# Patient Record
Sex: FEMALE | Race: WHITE | NOT HISPANIC OR LATINO | Employment: FULL TIME | URBAN - METROPOLITAN AREA
[De-identification: names, ages, dates, MRNs, and addresses within clinical notes are randomized per-mention and may not be internally consistent; named-entity substitution may affect disease eponyms.]

---

## 2017-10-26 ENCOUNTER — TRANSCRIBE ORDERS (OUTPATIENT)
Dept: ADMINISTRATIVE | Facility: HOSPITAL | Age: 57
End: 2017-10-26

## 2017-10-26 DIAGNOSIS — Z12.39 SCREENING BREAST EXAMINATION: Primary | ICD-10-CM

## 2017-11-22 ENCOUNTER — HOSPITAL ENCOUNTER (OUTPATIENT)
Dept: MAMMOGRAPHY | Facility: HOSPITAL | Age: 57
Discharge: HOME | End: 2017-11-22
Attending: FAMILY MEDICINE | Admitting: FAMILY MEDICINE

## 2017-11-22 DIAGNOSIS — Z12.39 SCREENING BREAST EXAMINATION: ICD-10-CM

## 2017-11-22 PROCEDURE — 77063 BREAST TOMOSYNTHESIS BI: CPT

## 2017-11-22 PROCEDURE — G0202 SCR MAMMO BI INCL CAD: HCPCS

## 2018-10-26 ENCOUNTER — TRANSCRIBE ORDERS (OUTPATIENT)
Dept: ADMINISTRATIVE | Facility: HOSPITAL | Age: 58
End: 2018-10-26

## 2018-10-26 DIAGNOSIS — Z12.39 SCREENING BREAST EXAMINATION: Primary | ICD-10-CM

## 2018-11-28 ENCOUNTER — HOSPITAL ENCOUNTER (OUTPATIENT)
Dept: MAMMOGRAPHY | Facility: HOSPITAL | Age: 58
Discharge: HOME | End: 2018-11-28
Attending: FAMILY MEDICINE | Admitting: FAMILY MEDICINE

## 2018-11-28 DIAGNOSIS — Z12.39 SCREENING BREAST EXAMINATION: ICD-10-CM

## 2018-11-28 PROCEDURE — 77063 BREAST TOMOSYNTHESIS BI: CPT

## 2018-11-28 PROCEDURE — 77067 SCR MAMMO BI INCL CAD: CPT

## 2018-12-10 ENCOUNTER — TELEPHONE (OUTPATIENT)
Dept: GASTROENTEROLOGY | Facility: CLINIC | Age: 58
End: 2018-12-10
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
## 2018-12-13 ENCOUNTER — PREP FOR SURGERY (OUTPATIENT)
Dept: OTHER | Facility: HOSPITAL | Age: 58
End: 2018-12-13

## 2018-12-13 DIAGNOSIS — D12.6 ADENOMATOUS POLYP OF COLON, UNSPECIFIED PART OF COLON: Primary | ICD-10-CM

## 2019-01-31 ENCOUNTER — TELEPHONE (OUTPATIENT)
Dept: GASTROENTEROLOGY | Facility: CLINIC | Age: 59
End: 2019-01-31

## 2019-01-31 NOTE — TELEPHONE ENCOUNTER
CALLED AND SPOKE WITH PT.  SHE DID NOT KNOW IF SOMEONE GOING TO CALL HER THE DAY BEFORE PROCEDURE TO INSTRUCTED HER ABOUT PREP.  EXPLAINED NEED TO FOLLOW THE DIRECTIONS ON THE MIRALAX SHEET.

## 2019-02-11 ENCOUNTER — OUTSIDE FACILITY SERVICE (OUTPATIENT)
Dept: GASTROENTEROLOGY | Facility: CLINIC | Age: 59
End: 2019-02-11

## 2019-02-11 PROCEDURE — 45380 COLONOSCOPY AND BIOPSY: CPT | Performed by: INTERNAL MEDICINE

## 2019-02-11 PROCEDURE — 88305 TISSUE EXAM BY PATHOLOGIST: CPT | Performed by: INTERNAL MEDICINE

## 2019-02-12 ENCOUNTER — LAB REQUISITION (OUTPATIENT)
Dept: LAB | Facility: HOSPITAL | Age: 59
End: 2019-02-12

## 2019-02-12 DIAGNOSIS — D12.6 BENIGN NEOPLASM OF COLON: ICD-10-CM

## 2019-02-13 LAB
CYTO UR: NORMAL
LAB AP CASE REPORT: NORMAL
LAB AP CLINICAL INFORMATION: NORMAL
PATH REPORT.FINAL DX SPEC: NORMAL
PATH REPORT.GROSS SPEC: NORMAL

## 2019-10-15 ENCOUNTER — OFFICE VISIT (OUTPATIENT)
Dept: ORTHOPEDIC SURGERY | Facility: CLINIC | Age: 59
End: 2019-10-15

## 2019-10-15 VITALS
SYSTOLIC BLOOD PRESSURE: 142 MMHG | DIASTOLIC BLOOD PRESSURE: 84 MMHG | HEART RATE: 75 BPM | WEIGHT: 203 LBS | HEIGHT: 67 IN | BODY MASS INDEX: 31.86 KG/M2

## 2019-10-15 DIAGNOSIS — R52 PAIN: Primary | ICD-10-CM

## 2019-10-15 DIAGNOSIS — M94.261 CHONDROMALACIA OF KNEE, RIGHT: ICD-10-CM

## 2019-10-15 PROCEDURE — 20610 DRAIN/INJ JOINT/BURSA W/O US: CPT | Performed by: ORTHOPAEDIC SURGERY

## 2019-10-15 PROCEDURE — 73562 X-RAY EXAM OF KNEE 3: CPT | Performed by: ORTHOPAEDIC SURGERY

## 2019-10-15 PROCEDURE — 99203 OFFICE O/P NEW LOW 30 MIN: CPT | Performed by: ORTHOPAEDIC SURGERY

## 2019-10-15 RX ORDER — FLUTICASONE PROPIONATE 50 MCG
SPRAY, SUSPENSION (ML) NASAL
COMMUNITY

## 2019-10-15 RX ORDER — ALPRAZOLAM 0.25 MG/1
TABLET ORAL
COMMUNITY

## 2019-10-15 RX ADMIN — TRIAMCINOLONE ACETONIDE 80 MG: 40 INJECTION, SUSPENSION INTRA-ARTICULAR; INTRAMUSCULAR at 16:05

## 2019-10-15 RX ADMIN — LIDOCAINE HYDROCHLORIDE 8 ML: 10 INJECTION, SOLUTION EPIDURAL; INFILTRATION; INTRACAUDAL; PERINEURAL at 16:05
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
## 2019-10-16 RX ORDER — TRIAMCINOLONE ACETONIDE 40 MG/ML
80 INJECTION, SUSPENSION INTRA-ARTICULAR; INTRAMUSCULAR
Status: COMPLETED | OUTPATIENT
Start: 2019-10-15 | End: 2019-10-15

## 2019-10-16 RX ORDER — LIDOCAINE HYDROCHLORIDE 10 MG/ML
8 INJECTION, SOLUTION EPIDURAL; INFILTRATION; INTRACAUDAL; PERINEURAL
Status: COMPLETED | OUTPATIENT
Start: 2019-10-15 | End: 2019-10-15

## 2019-10-22 ENCOUNTER — TRANSCRIBE ORDERS (OUTPATIENT)
Dept: ADMINISTRATIVE | Facility: HOSPITAL | Age: 59
End: 2019-10-22

## 2019-10-22 DIAGNOSIS — Z12.31 SCREENING MAMMOGRAM, ENCOUNTER FOR: Primary | ICD-10-CM

## 2019-11-12 ENCOUNTER — TRANSCRIBE ORDERS (OUTPATIENT)
Dept: ADMINISTRATIVE | Facility: HOSPITAL | Age: 59
End: 2019-11-12

## 2019-11-12 DIAGNOSIS — Z78.0 MENOPAUSE: Primary | ICD-10-CM

## 2019-12-03 ENCOUNTER — OFFICE VISIT (OUTPATIENT)
Dept: ORTHOPEDIC SURGERY | Facility: CLINIC | Age: 59
End: 2019-12-03

## 2019-12-03 VITALS — BODY MASS INDEX: 31.86 KG/M2 | WEIGHT: 203 LBS | HEIGHT: 67 IN

## 2019-12-03 DIAGNOSIS — M94.261 CHONDROMALACIA OF KNEE, RIGHT: Primary | ICD-10-CM

## 2019-12-03 PROCEDURE — 99212 OFFICE O/P EST SF 10 MIN: CPT | Performed by: ORTHOPAEDIC SURGERY

## 2019-12-03 NOTE — PROGRESS NOTES
Subjective:     Patient ID: Pamela Mack is a 59 y.o. female.    Chief Complaint: Follow-up right knee pain    History of Present Illness  Pamela Mack returns to clinic today for evaluation of her right knee   The patient had prior cortisone injection to the right knee on 10/15/19. She notes approximately 100% improvement of the pain with the injection, lasting 3 weeks. Since that time, some of her pain has returned but she states the pain is tolerable.  Today, she rates the pain as 5/10 at its worst, describes it as aching in nature. She has only had once instance of clicking and catching.    Localizes pain to medial and anterior aspects.    She has been very active in the past few weeks and has tolerated the activity reasonably well.   Symptoms are exacerbated with turning and crossing her legs.   Denies radiation of pain, denies associated numbness or tingling.     Social History     Occupational History   • Not on file   Tobacco Use   • Smoking status: Former Smoker   • Smokeless tobacco: Never Used   Substance and Sexual Activity   • Alcohol use: No     Frequency: Never   • Drug use: No   • Sexual activity: Defer      Past Medical History:   Diagnosis Date   • Colon polyp    • Fracture of wrist      Past Surgical History:   Procedure Laterality Date   • AVULSION TOENAIL PLATE Bilateral    • COLONOSCOPY     • GALLBLADDER SURGERY         Family History   Problem Relation Age of Onset   • Breast cancer Neg Hx          Review of Systems   Constitutional: Negative for chills, diaphoresis, fever and unexpected weight change.   HENT: Negative for hearing loss, nosebleeds, sore throat and tinnitus.    Eyes: Negative for pain and visual disturbance.   Respiratory: Negative for cough, shortness of breath and wheezing.    Cardiovascular: Negative for chest pain and palpitations.   Gastrointestinal: Negative for abdominal pain, diarrhea, nausea and vomiting.   Endocrine: Negative for cold intolerance, heat intolerance and  "polydipsia.   Genitourinary: Negative for difficulty urinating, dysuria and hematuria.   Musculoskeletal: Positive for arthralgias and myalgias. Negative for joint swelling.   Skin: Negative for rash and wound.   Allergic/Immunologic: Negative for environmental allergies.   Neurological: Negative for dizziness, syncope and numbness.   Hematological: Does not bruise/bleed easily.   Psychiatric/Behavioral: Negative for dysphoric mood and sleep disturbance. The patient is not nervous/anxious.            Objective:  Vitals:    12/03/19 1542   Weight: 92.1 kg (203 lb)   Height: 168.9 cm (66.5\")         12/03/19  1542   Weight: 92.1 kg (203 lb)     Body mass index is 32.27 kg/m².    General: No acute distress.  Resp: normal respiratory effort  Skin: no rashes or wounds; normal turgor  Psych: mood and affect appropriate; recent and remote memory intact      Ortho Exam       Right Knee-    ROM 0-130 degrees  Maximal tenderness medial and lateral patellar facet   Negative Liu    Effusion- mild     No opening on varus and valgus stress at 0 and 30  Active patellar compression test- positive   Log roll-  negative  Stinchfield-  negative    Positive sensation light tough all distributions symmetric to contralateral side  Brisk cap refill all digits  2+ dorsalis pedis pulse    Imaging:  None    Assessment:        1. Chondromalacia of knee, right           Plan:          1. Discussed treatment options at length with patient at today's visit.   2. Advised patient to continue at-home exercise program for improvement in strength, range of motion and function with daily activities.  3. Follow-up in 6 weeks, if her pain worsens we may consider a repeat cortisone injection.      Pamela Mack was in agreement with plan and had all questions answered.     Orders:  No orders of the defined types were placed in this encounter.      Medications:  No orders of the defined types were placed in this encounter.      Followup:  No follow-ups " on file.    Pamela was seen today for follow-up and pain.    Diagnoses and all orders for this visit:    Chondromalacia of knee, right        By signing my name here, I Lea Kramer, attest that all documentation on 12/10/19 at 9:29 AM has been prepared under the direction and in the presence of Dr. Zion Taylor.    I, Dr. Zion Taylor, personally performed the services described in this documentation, as scribed by Lea Kramer, in my presence, and it is both accurate and complete.      Dictated utilizing Dragon dictation

## 2019-12-04 ENCOUNTER — HOSPITAL ENCOUNTER (OUTPATIENT)
Dept: BONE DENSITY | Facility: HOSPITAL | Age: 59
End: 2019-12-04

## 2019-12-04 ENCOUNTER — APPOINTMENT (OUTPATIENT)
Dept: BONE DENSITY | Facility: HOSPITAL | Age: 59
End: 2019-12-04

## 2019-12-04 ENCOUNTER — HOSPITAL ENCOUNTER (OUTPATIENT)
Dept: MAMMOGRAPHY | Facility: HOSPITAL | Age: 59
Discharge: HOME | End: 2019-12-04
Admitting: FAMILY MEDICINE

## 2019-12-04 DIAGNOSIS — Z12.31 SCREENING MAMMOGRAM, ENCOUNTER FOR: ICD-10-CM

## 2019-12-04 DIAGNOSIS — Z78.0 MENOPAUSE: ICD-10-CM

## 2019-12-04 PROCEDURE — 77067 SCR MAMMO BI INCL CAD: CPT

## 2019-12-04 PROCEDURE — 77063 BREAST TOMOSYNTHESIS BI: CPT

## 2019-12-04 PROCEDURE — 77080 DXA BONE DENSITY AXIAL: CPT

## 2020-02-11 ENCOUNTER — OFFICE VISIT (OUTPATIENT)
Dept: ORTHOPEDIC SURGERY | Facility: CLINIC | Age: 60
End: 2020-02-11

## 2020-02-11 DIAGNOSIS — M94.261 CHONDROMALACIA OF KNEE, RIGHT: Primary | ICD-10-CM

## 2020-02-11 PROCEDURE — 99213 OFFICE O/P EST LOW 20 MIN: CPT | Performed by: ORTHOPAEDIC SURGERY

## 2020-02-11 RX ORDER — MELOXICAM 7.5 MG/1
7.5 TABLET ORAL DAILY
Qty: 30 TABLET | Refills: 0 | Status: SHIPPED | OUTPATIENT
Start: 2020-02-11 | End: 2023-02-10 | Stop reason: HOSPADM

## 2020-02-11 NOTE — PROGRESS NOTES
Subjective:     Patient ID: Pamela Mack is a 59 y.o. female.    Chief Complaint: follow-up right knee pain    History of Present Illness  Pamela Mack returns to clinic today for evaluation of her right knee. She states her right knee pain has worsened over the past 10 days. Her last cortisone injection was 10/15/19. Her pain is localized to the medial and anterior aspects of her knee. She rates her pain as a 7/10 at its worst with movement. She also reports some swelling.   She denies any radiation of her pain. She denies any numbness and tingling.      Social History     Occupational History   • Not on file   Tobacco Use   • Smoking status: Former Smoker   • Smokeless tobacco: Never Used   Substance and Sexual Activity   • Alcohol use: No     Frequency: Never   • Drug use: No   • Sexual activity: Defer      Past Medical History:   Diagnosis Date   • Colon polyp    • Fracture of wrist      Past Surgical History:   Procedure Laterality Date   • AVULSION TOENAIL PLATE Bilateral    • COLONOSCOPY     • GALLBLADDER SURGERY         Family History   Problem Relation Age of Onset   • Breast cancer Neg Hx          Review of Systems   Constitutional: Negative for chills, diaphoresis, fever and unexpected weight change.   HENT: Negative for hearing loss, nosebleeds, sore throat and tinnitus.    Eyes: Negative for pain and visual disturbance.   Respiratory: Negative for cough, shortness of breath and wheezing.    Cardiovascular: Negative for chest pain and palpitations.   Gastrointestinal: Negative for abdominal pain, diarrhea, nausea and vomiting.   Endocrine: Negative for cold intolerance, heat intolerance and polydipsia.   Genitourinary: Negative for difficulty urinating, dysuria and hematuria.   Musculoskeletal: Positive for arthralgias. Negative for joint swelling and myalgias.   Skin: Negative for rash and wound.   Allergic/Immunologic: Negative for environmental allergies.   Neurological: Negative for dizziness, syncope  and numbness.   Hematological: Does not bruise/bleed easily.   Psychiatric/Behavioral: Negative for dysphoric mood and sleep disturbance. The patient is not nervous/anxious.            Objective:  There were no vitals filed for this visit.  There were no vitals filed for this visit.  There is no height or weight on file to calculate BMI.     General: No acute distress.  Resp: normal respiratory effort  Skin: no rashes or wounds; normal turgor  Psych: mood and affect appropriate; recent and remote memory intact      Ortho Exam       Right Knee-    ROM 0-125 degrees  Positive  Patellofemoral crepitus  Moderate tenderness lateral patellar facet and posterior medial joint line  Negative Liu exam  Active patellar compression test- positive   Straight leg raise- negative  Log roll-  negative  Stinchfield-  negative  Positive sensation light tough all distributions symmetric to contralateral side  Brisk cap refill all digits  2+ dorsalis pedis pulse    Imaging:    Assessment:        1. Chondromalacia of knee, right           Plan:          1. Discussed treatment options at length with patient at today's visit.   2. Prescribed Meloxicam to be taken for 3 days with her intermittent pain flares.   3. Follow-up as needed, if her pain worsens we may consider a repeat cortisone injection.      Pamela Mack was in agreement with plan and had all questions answered.     Orders:  No orders of the defined types were placed in this encounter.      Medications:  New Medications Ordered This Visit   Medications   • meloxicam (MOBIC) 7.5 MG tablet     Sig: Take 1 tablet by mouth Daily.     Dispense:  30 tablet     Refill:  0       Followup:  Return if symptoms worsen or fail to improve.    Pamela was seen today for follow-up.    Diagnoses and all orders for this visit:    Chondromalacia of knee, right    Other orders  -     meloxicam (MOBIC) 7.5 MG tablet; Take 1 tablet by mouth Daily.        By signing my name here, I Lea Kramer,  attest that all documentation on 02/11/20 at 6:00 PM has been prepared under the direction and in the presence of Dr. Zion Taylor.    I, Dr. Zion Taylor, personally performed the services described in this documentation, as scribed by Lea Kramer, in my presence, and it is both accurate and complete.    Dictated utilizing Dragon dictation

## 2020-08-04 ENCOUNTER — OFFICE VISIT (OUTPATIENT)
Dept: ORTHOPEDIC SURGERY | Facility: CLINIC | Age: 60
End: 2020-08-04

## 2020-08-04 VITALS — HEIGHT: 67 IN | WEIGHT: 203 LBS | BODY MASS INDEX: 31.86 KG/M2

## 2020-08-04 DIAGNOSIS — M94.261 CHONDROMALACIA OF KNEE, RIGHT: Primary | ICD-10-CM

## 2020-08-04 PROCEDURE — 20610 DRAIN/INJ JOINT/BURSA W/O US: CPT | Performed by: ORTHOPAEDIC SURGERY

## 2020-08-04 PROCEDURE — 99213 OFFICE O/P EST LOW 20 MIN: CPT | Performed by: ORTHOPAEDIC SURGERY

## 2020-08-04 RX ORDER — LIDOCAINE HYDROCHLORIDE 10 MG/ML
8 INJECTION, SOLUTION EPIDURAL; INFILTRATION; INTRACAUDAL; PERINEURAL
Status: COMPLETED | OUTPATIENT
Start: 2020-08-04 | End: 2020-08-04

## 2020-08-04 RX ORDER — TRIAMCINOLONE ACETONIDE 40 MG/ML
80 INJECTION, SUSPENSION INTRA-ARTICULAR; INTRAMUSCULAR
Status: COMPLETED | OUTPATIENT
Start: 2020-08-04 | End: 2020-08-04

## 2020-08-04 RX ADMIN — LIDOCAINE HYDROCHLORIDE 8 ML: 10 INJECTION, SOLUTION EPIDURAL; INFILTRATION; INTRACAUDAL; PERINEURAL at 14:28

## 2020-08-04 RX ADMIN — TRIAMCINOLONE ACETONIDE 80 MG: 40 INJECTION, SUSPENSION INTRA-ARTICULAR; INTRAMUSCULAR at 14:28

## 2020-08-04 NOTE — PROGRESS NOTES
Subjective:     Patient ID: Pamela Mack is a 59 y.o. female.    Chief Complaint: follow-up right knee pain, chondromalacia    History of Present Illness  Pamela Mack returns to clinic today for evaluation of her right knee. She continues to complain of right knee pain at this time. She rates the pain as 9/10, describes it as aching in nature. Localizes pain to the posteromedial and anterolateral aspects of the leg. Has noted improvement with rest and anti-inflammatory medications. Symptoms are exacerbated with movement. Denies radiation of pain, denies associated numbness or tingling.    She noted significant improvement with last injection to the right knee for greater than 4 to 6 months with greater than 90% relief of her pain.     Social History     Occupational History   • Not on file   Tobacco Use   • Smoking status: Former Smoker   • Smokeless tobacco: Never Used   Substance and Sexual Activity   • Alcohol use: No     Frequency: Never   • Drug use: No   • Sexual activity: Defer      Past Medical History:   Diagnosis Date   • Colon polyp    • Fracture of wrist      Past Surgical History:   Procedure Laterality Date   • AVULSION TOENAIL PLATE Bilateral    • COLONOSCOPY     • GALLBLADDER SURGERY         Family History   Problem Relation Age of Onset   • Breast cancer Neg Hx          Review of Systems   Constitutional: Negative for chills, diaphoresis, fever and unexpected weight change.   HENT: Negative for hearing loss, nosebleeds, sore throat and tinnitus.    Eyes: Negative for pain and visual disturbance.   Respiratory: Negative for cough, shortness of breath and wheezing.    Cardiovascular: Negative for chest pain and palpitations.   Gastrointestinal: Negative for abdominal pain, diarrhea, nausea and vomiting.   Endocrine: Negative for cold intolerance, heat intolerance and polydipsia.   Genitourinary: Negative for difficulty urinating, dysuria and hematuria.   Musculoskeletal: Positive for arthralgias and  "myalgias. Negative for joint swelling.   Skin: Negative for rash and wound.   Allergic/Immunologic: Negative for environmental allergies.   Neurological: Negative for dizziness, syncope and numbness.   Hematological: Does not bruise/bleed easily.   Psychiatric/Behavioral: Negative for dysphoric mood and sleep disturbance. The patient is not nervous/anxious.            Objective:  Vitals:    08/04/20 1325   Weight: 92.1 kg (203 lb)   Height: 168.9 cm (66.5\")         08/04/20  1325   Weight: 92.1 kg (203 lb)     Body mass index is 32.27 kg/m².    General: No acute distress.  Resp: normal respiratory effort  Skin: no rashes or wounds; normal turgor  Psych: mood and affect appropriate; recent and remote memory intact        Ortho Exam       Right Knee-    ROM 0-130 degrees  4+/5 on flexion  4/5 on extension  Maximal tenderness lateral joint line    Effusion- moderate   Grade 1A [place]  Anterior drawer- negative  Posterior drawer- negative   No opening on varus and valgus stress at 0 and 30  Liu - negative    Positive sensation light tough all distributions symmetric to contralateral side  Brisk cap refill all digits  2+ dorsalis pedis pulse      Imaging:  None    Assessment:        1. Chondromalacia of knee, right           Plan:  Large Joint Arthrocentesis: R knee  Date/Time: 8/4/2020 2:28 PM  Consent given by: patient  Site marked: site marked  Timeout: Immediately prior to procedure a time out was called to verify the correct patient, procedure, equipment, support staff and site/side marked as required   Supporting Documentation  Indications: pain   Procedure Details  Location: knee - R knee  Preparation: Patient was prepped and draped in the usual sterile fashion  Needle size: 22 G  Approach: superior  Medications administered: 8 mL lidocaine PF 1% 1 %; 80 mg triamcinolone acetonide 40 MG/ML  Patient tolerance: patient tolerated the procedure well with no immediate complications                1. Discussed " treatment options at length with patient at today's visit.   2. Patient would like to proceed with cortisone injection today to the right knee. Recommended limited use of affected extremity for the next 24 hours to only essential activites other than work on general active and passive motion. Recommended supplementing with ice and soft tissue massage. Discussed with patient that they should see results in 5-7 days, if no improvement in 5-6 weeks I have asked them to call the office to review other options. Patient should call office immediately if they notice redness, warmth, fevers, chills, or residual numbness or tingling for greater than 6 hours after injection.   3. Follow-up with me as needed      Pamela Mack was in agreement with plan and had all questions answered.     Orders:  Orders Placed This Encounter   Procedures   • Large Joint Arthrocentesis: R knee       Medications:  No orders of the defined types were placed in this encounter.      Followup:  Return if symptoms worsen or fail to improve.    Pamela was seen today for pain and follow-up.    Diagnoses and all orders for this visit:    Chondromalacia of knee, right    Other orders  -     Large Joint Arthrocentesis: R knee           By signing my name here, I Mansi Rojas attest that all documentation on 08/04/20 at 19:01 has been prepared under the direction and in the presence of Dr. Zion Taylor MD.    I, Dr. Zion Taylor, personally performed the services described in this documentation, as scribed by Mansi Rojas, in my presence, and it is both accurate and complete.        Dictated utilizing Dragon dictation

## 2020-11-23 ENCOUNTER — TRANSCRIBE ORDERS (OUTPATIENT)
Dept: ADMINISTRATIVE | Facility: HOSPITAL | Age: 60
End: 2020-11-23

## 2020-11-23 DIAGNOSIS — Z12.31 VISIT FOR SCREENING MAMMOGRAM: Primary | ICD-10-CM

## 2020-12-15 ENCOUNTER — CLINICAL SUPPORT (OUTPATIENT)
Dept: ORTHOPEDIC SURGERY | Facility: CLINIC | Age: 60
End: 2020-12-15

## 2020-12-15 VITALS — WEIGHT: 203 LBS | HEIGHT: 67 IN | BODY MASS INDEX: 31.86 KG/M2

## 2020-12-15 DIAGNOSIS — M94.261 CHONDROMALACIA OF KNEE, RIGHT: Primary | ICD-10-CM

## 2020-12-15 PROBLEM — E66.9 OBESITY WITH BODY MASS INDEX 30 OR GREATER: Status: ACTIVE | Noted: 2019-09-23

## 2020-12-15 PROBLEM — F41.1 GENERALIZED ANXIETY DISORDER: Status: ACTIVE | Noted: 2019-03-28

## 2020-12-15 PROCEDURE — 20610 DRAIN/INJ JOINT/BURSA W/O US: CPT | Performed by: ORTHOPAEDIC SURGERY

## 2020-12-15 RX ADMIN — LIDOCAINE HYDROCHLORIDE 8 ML: 10 INJECTION, SOLUTION EPIDURAL; INFILTRATION; INTRACAUDAL; PERINEURAL at 15:07

## 2020-12-15 RX ADMIN — TRIAMCINOLONE ACETONIDE 80 MG: 40 INJECTION, SUSPENSION INTRA-ARTICULAR; INTRAMUSCULAR at 15:07

## 2020-12-15 NOTE — PROGRESS NOTES
Procedure   Large Joint Arthrocentesis: R knee  Date/Time: 12/15/2020 3:07 PM  Consent given by: patient  Site marked: site marked  Timeout: Immediately prior to procedure a time out was called to verify the correct patient, procedure, equipment, support staff and site/side marked as required   Supporting Documentation  Indications: pain   Procedure Details  Location: knee - R knee  Preparation: Patient was prepped and draped in the usual sterile fashion  Needle size: 22 G  Approach: superior (lateral)  Medications administered: 8 mL lidocaine PF 1% 1 %; 80 mg triamcinolone acetonide 40 MG/ML  Patient tolerance: patient tolerated the procedure well with no immediate complications            Cc: Right knee DJD    Patient presents to clinic today for right knee intra-articular cortisone injection(s). I explained details of injections as well as risks, benefits and alternatives with the patient today, had all questions answered, wished to proceed with injections. Patient was instructed to watch for signs or symptoms of infection including redness, swelling, warmth to the touch, or significant increased pain and to contact our office immediately if any of these issues were noted.

## 2020-12-18 ENCOUNTER — HOSPITAL ENCOUNTER (OUTPATIENT)
Dept: MAMMOGRAPHY | Facility: HOSPITAL | Age: 60
Discharge: HOME | End: 2020-12-18
Admitting: FAMILY MEDICINE

## 2020-12-18 DIAGNOSIS — Z12.31 VISIT FOR SCREENING MAMMOGRAM: ICD-10-CM

## 2020-12-18 PROCEDURE — 77063 BREAST TOMOSYNTHESIS BI: CPT

## 2020-12-18 PROCEDURE — 77067 SCR MAMMO BI INCL CAD: CPT

## 2020-12-18 RX ORDER — LIDOCAINE HYDROCHLORIDE 10 MG/ML
8 INJECTION, SOLUTION EPIDURAL; INFILTRATION; INTRACAUDAL; PERINEURAL
Status: COMPLETED | OUTPATIENT
Start: 2020-12-15 | End: 2020-12-15

## 2020-12-18 RX ORDER — TRIAMCINOLONE ACETONIDE 40 MG/ML
80 INJECTION, SUSPENSION INTRA-ARTICULAR; INTRAMUSCULAR
Status: COMPLETED | OUTPATIENT
Start: 2020-12-15 | End: 2020-12-15

## 2021-01-26 ENCOUNTER — OFFICE VISIT (OUTPATIENT)
Dept: ORTHOPEDIC SURGERY | Facility: CLINIC | Age: 61
End: 2021-01-26

## 2021-01-26 VITALS
WEIGHT: 203 LBS | SYSTOLIC BLOOD PRESSURE: 129 MMHG | HEART RATE: 78 BPM | BODY MASS INDEX: 31.86 KG/M2 | DIASTOLIC BLOOD PRESSURE: 85 MMHG | HEIGHT: 67 IN

## 2021-01-26 DIAGNOSIS — R52 PAIN: Primary | ICD-10-CM

## 2021-01-26 DIAGNOSIS — M94.262 CHONDROMALACIA OF KNEE, LEFT: ICD-10-CM

## 2021-01-26 PROBLEM — F43.21 GRIEF: Status: ACTIVE | Noted: 2020-08-17

## 2021-01-26 PROCEDURE — 20610 DRAIN/INJ JOINT/BURSA W/O US: CPT | Performed by: ORTHOPAEDIC SURGERY

## 2021-01-26 PROCEDURE — 73562 X-RAY EXAM OF KNEE 3: CPT | Performed by: ORTHOPAEDIC SURGERY

## 2021-01-26 PROCEDURE — 99214 OFFICE O/P EST MOD 30 MIN: CPT | Performed by: ORTHOPAEDIC SURGERY

## 2021-01-26 RX ORDER — LIDOCAINE HYDROCHLORIDE 10 MG/ML
8 INJECTION, SOLUTION EPIDURAL; INFILTRATION; INTRACAUDAL; PERINEURAL
Status: COMPLETED | OUTPATIENT
Start: 2021-01-26 | End: 2021-01-26

## 2021-01-26 RX ORDER — TRIAMCINOLONE ACETONIDE 40 MG/ML
80 INJECTION, SUSPENSION INTRA-ARTICULAR; INTRAMUSCULAR
Status: COMPLETED | OUTPATIENT
Start: 2021-01-26 | End: 2021-01-26

## 2021-01-26 RX ADMIN — LIDOCAINE HYDROCHLORIDE 8 ML: 10 INJECTION, SOLUTION EPIDURAL; INFILTRATION; INTRACAUDAL; PERINEURAL at 15:41

## 2021-01-26 RX ADMIN — TRIAMCINOLONE ACETONIDE 80 MG: 40 INJECTION, SUSPENSION INTRA-ARTICULAR; INTRAMUSCULAR at 15:41

## 2021-01-26 NOTE — PROGRESS NOTES
Subjective:     Patient ID: Pamela Mack is a 60 y.o. female.    Chief Complaint:  Left knee pain, new issue  History of Present Illness  Pamela Mack presents to clinic today for evaluation of left knee pain which has been present for the last several years intermittently but particular worse over the last 3 months, has had good response with injections of the right knee most recently back on December 15.  Localizes pain to the medial and anterior aspect of the left knee with some radiation down the medial aspect of the leg, denies associated numbness or tingling, denies any catching or locking in the knee.  Exacerbated with prolonged weightbearing, getting up from a seated position, and deep flexion, mild improvement with rest and ibuprofen.  Rates pain as 9 out of 10 and describes it as sharp in nature.  Mild intermittent swelling that does wax and wane.  No prior injections or physical therapy for left knee.     Social History     Occupational History   • Not on file   Tobacco Use   • Smoking status: Former Smoker   • Smokeless tobacco: Never Used   Substance and Sexual Activity   • Alcohol use: No     Frequency: Never   • Drug use: No   • Sexual activity: Defer      Past Medical History:   Diagnosis Date   • Colon polyp    • Fracture of wrist      Past Surgical History:   Procedure Laterality Date   • AVULSION TOENAIL PLATE Bilateral    • COLONOSCOPY     • GALLBLADDER SURGERY         Family History   Problem Relation Age of Onset   • Breast cancer Neg Hx          Review of Systems   Constitutional: Negative for chills, diaphoresis, fever and unexpected weight change.   HENT: Negative for hearing loss, nosebleeds, sore throat and tinnitus.    Eyes: Negative for pain and visual disturbance.   Respiratory: Negative for cough, shortness of breath and wheezing.    Cardiovascular: Negative for chest pain and palpitations.   Gastrointestinal: Negative for abdominal pain, diarrhea, nausea and vomiting.   Endocrine:  "Negative for cold intolerance, heat intolerance and polydipsia.   Genitourinary: Negative for difficulty urinating, dysuria and hematuria.   Musculoskeletal: Positive for arthralgias and myalgias. Negative for joint swelling.   Skin: Negative for rash and wound.   Allergic/Immunologic: Negative for environmental allergies.   Neurological: Negative for dizziness, syncope and numbness.   Hematological: Does not bruise/bleed easily.   Psychiatric/Behavioral: Negative for dysphoric mood and sleep disturbance. The patient is not nervous/anxious.            Objective:  Vitals:    01/26/21 1457   BP: 129/85   BP Location: Right arm   Pulse: 78   Weight: 92.1 kg (203 lb)   Height: 168.9 cm (66.5\")         01/26/21  1457   Weight: 92.1 kg (203 lb)     Body mass index is 32.27 kg/m².  Physical Exam    Vital signs reviewed.   General: No acute distress, alert and oriented  Eyes: conjunctiva clear; pupils equally round and reactive  ENT: external ears and nose atraumatic; oropharynx clear  CV: no peripheral edema  Resp: normal respiratory effort  Skin: no rashes or wounds; normal turgor  Psych: mood and affect appropriate; recent and remote memory intact          Ortho Exam     Left Knee-    ROM 0-125 degrees  4+/5 on flexion  4+/5 on extension  Maximal tenderness medial joint line    Effusion- moderate   Grade 1A [place]  Anterior drawer- negative  Posterior drawer- negative   No opening on varus and valgus stress at 0 and 30  Active patellar compression test- positive     Log roll-  negative  Stinchfield-  negative    J-sign- negative    Positive sensation light tough all distributions symmetric to contralateral side  Brisk cap refill all digits  2+ dorsalis pedis pulse          Imaging:  Left Knee X-Ray  Indication: Pain    AP, Lateral, and [place] views    Findings:  No fracture  No bony lesion  Normal soft tissues  Mild medial and patellofemoral compartment joint space narrowing with slight varus alignment, mild reactive " osteophyte formation primarily along the femoral trochlea on axial view    No prior studies were available for comparison.    Assessment:        1. Pain    2. Chondromalacia of knee, left           Plan:  Large Joint Arthrocentesis: L knee  Date/Time: 1/26/2021 3:41 PM  Consent given by: patient  Site marked: site marked  Timeout: Immediately prior to procedure a time out was called to verify the correct patient, procedure, equipment, support staff and site/side marked as required   Supporting Documentation  Indications: pain   Procedure Details  Location: knee - L knee  Preparation: Patient was prepped and draped in the usual sterile fashion  Needle size: 22 G  Approach: superior (lateral)  Medications administered: 8 mL lidocaine PF 1% 1 %; 80 mg triamcinolone acetonide 40 MG/ML  Patient tolerance: patient tolerated the procedure well with no immediate complications                1. Discussed treatment options at length with patient at today's visit.  Given significance of pain at this point time we discussed options and with failure of other conservative treatments including home exercises and anti-inflammatory medications over-the-counter, she wished to proceed with intra-articular injection today.  If she starts to have more significant mechanical symptoms or fails improvement with injection we may consider advanced imaging to evaluate for chondral or meniscal pathology.      Pamela Mack was in agreement with plan and had all questions answered.     Orders:  Orders Placed This Encounter   Procedures   • Large Joint Arthrocentesis: L knee   • XR Knee 3 View Left       Medications:  No orders of the defined types were placed in this encounter.      Followup:  Return in about 3 months (around 4/26/2021).    Diagnoses and all orders for this visit:    1. Pain (Primary)  -     XR Knee 3 View Left    2. Chondromalacia of knee, left    Other orders  -     Large Joint Arthrocentesis: L knee          Dictated utilizing  Kimberly dictation

## 2021-04-27 ENCOUNTER — OFFICE VISIT (OUTPATIENT)
Dept: ORTHOPEDIC SURGERY | Facility: CLINIC | Age: 61
End: 2021-04-27

## 2021-04-27 VITALS — WEIGHT: 203 LBS | BODY MASS INDEX: 31.86 KG/M2 | HEIGHT: 67 IN

## 2021-04-27 DIAGNOSIS — M94.262 CHONDROMALACIA OF KNEE, LEFT: ICD-10-CM

## 2021-04-27 DIAGNOSIS — M94.261 CHONDROMALACIA OF KNEE, RIGHT: Primary | ICD-10-CM

## 2021-04-27 PROCEDURE — 99213 OFFICE O/P EST LOW 20 MIN: CPT | Performed by: ORTHOPAEDIC SURGERY

## 2021-04-27 PROCEDURE — 20610 DRAIN/INJ JOINT/BURSA W/O US: CPT | Performed by: ORTHOPAEDIC SURGERY

## 2021-04-27 PROCEDURE — 73562 X-RAY EXAM OF KNEE 3: CPT | Performed by: ORTHOPAEDIC SURGERY

## 2021-04-27 RX ORDER — CETIRIZINE HYDROCHLORIDE 10 MG/1
TABLET ORAL
COMMUNITY

## 2021-04-27 RX ADMIN — LIDOCAINE HYDROCHLORIDE 8 ML: 10 INJECTION, SOLUTION EPIDURAL; INFILTRATION; INTRACAUDAL; PERINEURAL at 16:10

## 2021-04-27 RX ADMIN — TRIAMCINOLONE ACETONIDE 80 MG: 40 INJECTION, SUSPENSION INTRA-ARTICULAR; INTRAMUSCULAR at 16:10

## 2021-04-27 NOTE — PROGRESS NOTES
Subjective:     Patient ID: Pamela Mack is a 60 y.o. female.    Chief Complaint: bilateral knee pain, follow-up  Last cortisone injection L knee 1/26/2021  Last cortisone injection R knee 12/15/2020    History of Present Illness  Pamela Mack returns to clinic today for evaluation of bilateral knee pain status post cortisone injection with right worse than left. The injection provided 80% relief persisting for 8 weeks. The pain is localized to the anterior medial with radiation to the leg and hip. Her pain is rated 6-7/10 in severity today and is aching and sharp in quality.  The pain is aggravated by sitting for work but alleviated by injections. Denies swelling, tingling, or numbness. Denies injury.        Social History     Occupational History   • Not on file   Tobacco Use   • Smoking status: Former Smoker   • Smokeless tobacco: Never Used   Vaping Use   • Vaping Use: Never used   Substance and Sexual Activity   • Alcohol use: No   • Drug use: No   • Sexual activity: Defer      Past Medical History:   Diagnosis Date   • Colon polyp    • Fracture of wrist      Past Surgical History:   Procedure Laterality Date   • AVULSION TOENAIL PLATE Bilateral    • COLONOSCOPY     • GALLBLADDER SURGERY         Family History   Problem Relation Age of Onset   • Breast cancer Neg Hx          Review of Systems   Constitutional: Negative for chills, diaphoresis, fever and unexpected weight change.   HENT: Negative for hearing loss, nosebleeds, sore throat and tinnitus.    Eyes: Negative for pain and visual disturbance.   Respiratory: Negative for cough, shortness of breath and wheezing.    Cardiovascular: Negative for chest pain and palpitations.   Gastrointestinal: Negative for abdominal pain, diarrhea, nausea and vomiting.   Endocrine: Negative for cold intolerance, heat intolerance and polydipsia.   Genitourinary: Negative for difficulty urinating, dysuria and hematuria.   Musculoskeletal: Positive for arthralgias and myalgias.  "Negative for joint swelling.   Skin: Negative for rash and wound.   Allergic/Immunologic: Negative for environmental allergies.   Neurological: Negative for dizziness, syncope and numbness.   Hematological: Does not bruise/bleed easily.   Psychiatric/Behavioral: Negative for dysphoric mood and sleep disturbance. The patient is not nervous/anxious.            Objective:  Vitals:    04/27/21 1548   Weight: 92.1 kg (203 lb)   Height: 168.9 cm (66.5\")         04/27/21  1548   Weight: 92.1 kg (203 lb)     Body mass index is 32.27 kg/m².  General: No acute distress.  Resp: normal respiratory effort  Skin: no rashes or wounds; normal turgor  Psych: mood and affect appropriate; recent and remote memory intact          Ortho Exam     Right Knee-    ROM 0-125 degrees  4+/5 on flexion  4+/5 on extension  Maximal tenderness lateral joint line  Valgus alignment  Lateral patella tracking    Effusion- moderate   Active patellar compression test- positive     J-sign- negative    Positive sensation light tough all distributions symmetric to contralateral side  Brisk cap refill all digits  1+ dorsalis pedis pulse          Imaging:  Right Knee X-Ray  Indication: Pain    AP, Lateral, and [place] views    Findings:  No fracture  No bony lesion  Normal soft tissues  Moderate joint space narrowing most significant in the patellofemoral joint with slight valgus alignment noted, mild reactive osteophyte formation in all 3 compartments     compared to prior office x-rays    Assessment:        1. Chondromalacia of knee, right    2. Chondromalacia of knee, left           Plan:  Large Joint Arthrocentesis: R knee  Date/Time: 4/27/2021 4:10 PM  Consent given by: patient  Site marked: site marked  Timeout: Immediately prior to procedure a time out was called to verify the correct patient, procedure, equipment, support staff and site/side marked as required   Supporting Documentation  Indications: pain   Procedure Details  Location: knee - R " knee  Preparation: Patient was prepped and draped in the usual sterile fashion  Needle size: 22 G  Approach: anterolateral  Medications administered: 80 mg triamcinolone acetonide 40 MG/ML; 8 mL lidocaine PF 1% 1 %  Patient tolerance: patient tolerated the procedure well with no immediate complications              1. Discussed treatment options at length with patient at today's visit including activity management and cortisone injections as well as further work-up with advanced imaging and possible surgical treatments including arthroscopy versus total knee arthroplasty.  Patient has declined further work-up and surgical treatments, would like to proceed with injection today.  2. Patient would like to proceed with cortisone injection today to the right knee. Recommended limited use of affected extremity for the next 24 hours to only essential activites other than work on general active and passive motion. Recommended supplementing with ice and soft tissue massage. Discussed with patient that they should see results in 5-7 days, if no improvement in 5-6 weeks I have asked them to call the office to review other options. Patient should call office immediately if they notice redness, warmth, fevers, chills, or residual numbness or tingling for greater than 6 hours after injection.   3. Follow-up as needed.      Pamela Mack was in agreement with plan and had all questions answered.     Orders:  Orders Placed This Encounter   Procedures   • Large Joint Arthrocentesis: R knee   • XR Knee 3 View Right       Medications:  No orders of the defined types were placed in this encounter.      Followup:  Return if symptoms worsen or fail to improve.    Diagnoses and all orders for this visit:    1. Chondromalacia of knee, right (Primary)  -     XR Knee 3 View Right    2. Chondromalacia of knee, left    Other orders  -     Large Joint Arthrocentesis: R knee        SCRIBE ATTESTATION:  Vern AMADOR, attest that all medical  record entries for this patient were documented by me acting as a medical scribe for Zion Taylor MD.    PROVIDER ATTESTATION:  I, Zion Taylor MD, personally performed the services described in this documentation. All medical record entries made by the scribe were at my direction and in my presence. I have reviewed the chart and discharge instructions and agree that the record reflects my personal performance and is accurate and complete.  Zion Taylor MD.    Electronically signed: Zion Taylor MD 4/28/2021 09:49 EDT       Dictated utilizing Dragon dictation

## 2021-04-28 RX ORDER — TRIAMCINOLONE ACETONIDE 40 MG/ML
80 INJECTION, SUSPENSION INTRA-ARTICULAR; INTRAMUSCULAR
Status: COMPLETED | OUTPATIENT
Start: 2021-04-27 | End: 2021-04-27

## 2021-04-28 RX ORDER — LIDOCAINE HYDROCHLORIDE 10 MG/ML
8 INJECTION, SOLUTION EPIDURAL; INFILTRATION; INTRACAUDAL; PERINEURAL
Status: COMPLETED | OUTPATIENT
Start: 2021-04-27 | End: 2021-04-27

## 2021-10-25 ENCOUNTER — CLINICAL SUPPORT (OUTPATIENT)
Dept: ORTHOPEDIC SURGERY | Facility: CLINIC | Age: 61
End: 2021-10-25

## 2021-10-25 VITALS — HEIGHT: 66 IN | WEIGHT: 208 LBS | BODY MASS INDEX: 33.43 KG/M2

## 2021-10-25 DIAGNOSIS — M94.261 CHONDROMALACIA OF KNEE, RIGHT: Primary | ICD-10-CM

## 2021-10-25 PROCEDURE — 20610 DRAIN/INJ JOINT/BURSA W/O US: CPT | Performed by: ORTHOPAEDIC SURGERY

## 2021-10-25 RX ADMIN — TRIAMCINOLONE ACETONIDE 80 MG: 40 INJECTION, SUSPENSION INTRA-ARTICULAR; INTRAMUSCULAR at 15:48

## 2021-10-25 RX ADMIN — LIDOCAINE HYDROCHLORIDE 8 ML: 10 INJECTION, SOLUTION EPIDURAL; INFILTRATION; INTRACAUDAL; PERINEURAL at 15:48

## 2021-11-04 RX ORDER — LIDOCAINE HYDROCHLORIDE 10 MG/ML
8 INJECTION, SOLUTION EPIDURAL; INFILTRATION; INTRACAUDAL; PERINEURAL
Status: COMPLETED | OUTPATIENT
Start: 2021-10-25 | End: 2021-10-25

## 2021-11-04 RX ORDER — TRIAMCINOLONE ACETONIDE 40 MG/ML
80 INJECTION, SUSPENSION INTRA-ARTICULAR; INTRAMUSCULAR
Status: COMPLETED | OUTPATIENT
Start: 2021-10-25 | End: 2021-10-25

## 2021-12-27 ENCOUNTER — OFFICE VISIT (OUTPATIENT)
Dept: OBSTETRICS AND GYNECOLOGY | Facility: CLINIC | Age: 61
End: 2021-12-27

## 2021-12-27 VITALS
SYSTOLIC BLOOD PRESSURE: 130 MMHG | HEIGHT: 66 IN | DIASTOLIC BLOOD PRESSURE: 82 MMHG | BODY MASS INDEX: 34.07 KG/M2 | WEIGHT: 212 LBS

## 2021-12-27 DIAGNOSIS — Z13.89 SCREENING FOR GENITOURINARY CONDITION: ICD-10-CM

## 2021-12-27 DIAGNOSIS — N36.2 URETHRAL CARUNCLE: ICD-10-CM

## 2021-12-27 DIAGNOSIS — R31.9 HEMATURIA, UNSPECIFIED TYPE: Primary | ICD-10-CM

## 2021-12-27 DIAGNOSIS — Z12.31 ENCOUNTER FOR SCREENING MAMMOGRAM FOR MALIGNANT NEOPLASM OF BREAST: ICD-10-CM

## 2021-12-27 DIAGNOSIS — N81.9 FEMALE GENITAL PROLAPSE, UNSPECIFIED TYPE: ICD-10-CM

## 2021-12-27 LAB
BILIRUB BLD-MCNC: NEGATIVE MG/DL
CLARITY, POC: CLEAR
COLOR UR: YELLOW
GLUCOSE UR STRIP-MCNC: NEGATIVE MG/DL
KETONES UR QL: NEGATIVE
LEUKOCYTE EST, POC: NEGATIVE
NITRITE UR-MCNC: NEGATIVE MG/ML
PH UR: 7 [PH] (ref 5–8)
PROT UR STRIP-MCNC: NEGATIVE MG/DL
RBC # UR STRIP: ABNORMAL /UL
SP GR UR: 1.03 (ref 1–1.03)
UROBILINOGEN UR QL: NORMAL

## 2021-12-27 PROCEDURE — 81002 URINALYSIS NONAUTO W/O SCOPE: CPT | Performed by: OBSTETRICS & GYNECOLOGY

## 2021-12-27 PROCEDURE — 99204 OFFICE O/P NEW MOD 45 MIN: CPT | Performed by: OBSTETRICS & GYNECOLOGY

## 2021-12-27 RX ORDER — ESTRADIOL 0.1 MG/G
CREAM VAGINAL
Qty: 45 G | Refills: 2 | Status: SHIPPED | OUTPATIENT
Start: 2021-12-27

## 2021-12-27 NOTE — PROGRESS NOTES
New GYN Exam    CC- Here for consult regarding bladder prolapse    Pamela Mack is a 61 y.o. female new patient who presents for discussion of pelvic prolapse.  She had an ablation in her late 40s and has not had any bleeding since that time.  She states she is up-to-date on her Pap smear.  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  She does not notice this bulge on a daily basis.  She is not had any postmenopausal bleeding and she does not have any bladder incontinence or fecal incontinence.  She is not sexually active at present.  She does have large blood in her urine today and denies any gross hematuria or pain.  She has had intermittent dysuria in the past 3 weeks but she characterizes that is very mild.     OB History        1    Para   1    Term   1            AB        Living   1       SAB        IAB        Ectopic        Molar        Multiple        Live Births              Obstetric Comments   1  9.2 pound infant             Menarche:13  Menopause:ablation mid 40s  HRT:none  Current contraception: post menopausal status  History of abnormal Pap smear: no  History of abnormal mammogram: no  Family history of uterine, colon or ovarian cancer: no  Family history of breast cancer: no  STD's: none   Last pap smear:1-2 years  Gardasil: missed  MITZI: none      Health Maintenance   Topic Date Due   • Annual Gynecologic Pelvic and Breast Exam  Never done   • ANNUAL PHYSICAL  Never done   • COVID-19 Vaccine (1) Never done   • ZOSTER VACCINE (1 of 2) Never done   • HEPATITIS C SCREENING  Never done   • PAP SMEAR  Never done   • INFLUENZA VACCINE  2021   • MAMMOGRAM  2022   • COLORECTAL CANCER SCREENING  2024   • TDAP/TD VACCINES (2 - Td or Tdap) 2025   • Pneumococcal Vaccine 0-64  Aged Out       Past Medical History:   Diagnosis Date   • Anxiety    •  Colon polyp    • Fracture of wrist        Past Surgical History:   Procedure Laterality Date   • AVULSION TOENAIL PLATE Bilateral    • COLONOSCOPY     • GALLBLADDER SURGERY           Current Outpatient Medications:   •  ALPRAZolam (XANAX) 0.25 MG tablet, Xanax 0.25 mg tablet  Take 1 tablet every day by oral route as needed., Disp: , Rfl:   •  Calcium Carbonate-Vitamin D 600-200 MG-UNIT tablet, Every 12 (Twelve) Hours., Disp: , Rfl:   •  cetirizine (zyrTEC) 10 MG tablet, cetirizine 10 mg tablet  Take 1 tablet every day by oral route., Disp: , Rfl:   •  fluticasone (FLONASE) 50 MCG/ACT nasal spray, fluticasone propionate 50 mcg/actuation nasal spray,suspension  1-2 sprays each nostril daily, Disp: , Rfl:   •  meloxicam (MOBIC) 7.5 MG tablet, Take 1 tablet by mouth Daily., Disp: 30 tablet, Rfl: 0  •  estradiol (ESTRACE) 0.1 MG/GM vaginal cream, Apply pea sized amount to urethra twice a week before bedtime, Disp: 45 g, Rfl: 2    Allergies   Allergen Reactions   • Erythromycin Myalgia       Social History     Tobacco Use   • Smoking status: Former Smoker   • Smokeless tobacco: Never Used   Vaping Use   • Vaping Use: Never used   Substance Use Topics   • Alcohol use: No   • Drug use: No       Family History   Problem Relation Age of Onset   • Breast cancer Neg Hx    • Ovarian cancer Neg Hx    • Uterine cancer Neg Hx    • Colon cancer Neg Hx    • Deep vein thrombosis Neg Hx    • Pulmonary embolism Neg Hx        Review of Systems   Constitutional: Positive for activity change. Negative for appetite change, fatigue, fever and unexpected weight change.   Eyes: Negative for photophobia and visual disturbance.   Respiratory: Negative for cough and shortness of breath.    Cardiovascular: Negative for chest pain and palpitations.   Gastrointestinal: Negative for abdominal distention, abdominal pain, constipation, diarrhea and nausea.   Endocrine: Negative for cold intolerance and heat intolerance.   Genitourinary: Positive for  "vaginal pain (bulge). Negative for dyspareunia, dysuria, menstrual problem, pelvic pain and vaginal discharge.   Musculoskeletal: Negative for back pain.   Skin: Negative for color change and rash.   Neurological: Negative for headaches.   Hematological: Negative for adenopathy. Does not bruise/bleed easily.   Psychiatric/Behavioral: Negative for dysphoric mood. The patient is not nervous/anxious.           /82   Ht 167.6 cm (66\")   Wt 96.2 kg (212 lb)   BMI 34.22 kg/m²     Physical Exam  Vitals and nursing note reviewed. Exam conducted with a chaperone present.   Constitutional:       Appearance: Normal appearance. She is well-developed. She is obese.   HENT:      Head: Normocephalic and atraumatic.   Eyes:      General: No scleral icterus.     Conjunctiva/sclera: Conjunctivae normal.   Neck:      Thyroid: No thyromegaly.   Cardiovascular:      Rate and Rhythm: Normal rate and regular rhythm.   Pulmonary:      Breath sounds: Normal breath sounds.   Abdominal:      General: There is no distension.      Palpations: Abdomen is soft. There is no mass.      Tenderness: There is no abdominal tenderness. There is no guarding or rebound.      Hernia: No hernia is present.   Genitourinary:     Labia:         Right: No rash, tenderness, lesion or injury.         Left: No rash, tenderness, lesion or injury.       Urethra: Prolapse present. No urethral pain, urethral swelling or urethral lesion.      Vagina: No signs of injury and foreign body. Prolapsed vaginal walls present. No vaginal discharge, erythema, tenderness, bleeding or lesions.      Cervix: No cervical motion tenderness, discharge, friability, lesion, erythema, cervical bleeding or eversion.      Uterus: Normal.       Adnexa: Right adnexa normal.      Rectum: Normal.          Comments: Exam limited due to habitus  Grade 1 cystocele and rectocele  Musculoskeletal:      Cervical back: Neck supple.   Skin:     General: Skin is warm and dry.   Neurological: "      Mental Status: She is alert and oriented to person, place, and time.   Psychiatric:         Mood and Affect: Mood normal.         Behavior: Behavior normal.         Thought Content: Thought content normal.         Judgment: Judgment normal.            Assessment/Plan  1) POP- pt has grade 1 cystocele and rectocele. She is relatively asymptomatic at present but she has been resting more than usual given the holidays.  Since her degree of concern seems out of proportion with her exam, we did check an vaginal ultrasound to make sure that she did not have any uterine masses.  Her ultrasound today shows a 5.9 cm anteverted uterus.  Her endometrial lining is not well-visualized due to her history of ablation.  Her ovaries are not seen but the adnexa appear to be normal.  There is noted to be a calcification in her uterus that measures 0.7 x 0.3 cm and a small amount of fluid top of the uterine cavity that measures 0.7 x 0.5 cm.  There is no comparable imaging data.  We discussed that pelvic exercises and avoidance of heavy lifting and chronic coughing can improve her pelvic tone and prevent worsening prolapse.  She would like to try this and she was given information on pelvic exercises to do at home.  We did discuss physical therapy as an option but she would like to try home therapy first as she has no incontinence.  2) GYN HM: UTD 1-2 years  SBE demonstrated and encouraged.  3) STD screening: declines Condoms encouraged.  4) Bone health - Weight bearing exercise, dietary calcium recommendations and vitamin D reviewed.   5) Diet and Exercise discussed  6) Smoking Status: No  7) Social: no issues  8)MMG: UTD 12/2020- B1. Schedule MMG now  9) DEXA-UTD 12/2019- normal BMD, repeat in 3-5 years  10)C scope-UTD 2/2019- repeat 5 years   11) Hematuria- check UA and CS.  Discussed that if she continues to have hematuria on repeat urinalysis, she will need referral to urology and she voices understanding.  12) urethral  caruncle-we discussed that this can contribute to hematuria.  Rx for Estrace, pea-sized amount to urethra twice a week before bedtime.Patient counseled that vaginal estrogen rings, creams and tablets are available and highly effective at treating local vaginal symptoms such as atrophy and vaginal dryness.  Vaginal estrogen does not cause uterine overgrowth and does not require a progestogen to protect the uterus.  Very small amounts of estrogen are absorbed systemically.  For patients with a history of an estrogen dependent cancer such as breast cancer, the decision to use local estrogen for local vaginal symptoms should be made after consultation with her oncologist.  Possible side effects include local irritation or burning and/or vaginal bleeding and should always be reported.    13)RTO 3 months annual and recheck urethra and POP symptoms.   14) I saw the patient with a face mask, gloves and eye protection  The patient herself was masked.  Social distancing was observed as appropriate.  15) Time Spent: I spent > 45 minutes caring for Pamela on this date of service. This time includes time spent by me in the following activities: preparing for the visit, reviewing tests, obtaining and/or reviewing a separately obtained history, performing a medically appropriate examination and/or evaluation, counseling and educating the patient/family/caregiver, ordering medications, tests, or procedures, documenting information in the medical record and independently interpreting results and communicating that information with the patient/family/caregiver.         Diagnoses and all orders for this visit:    1. Hematuria, unspecified type (Primary)  -     Urine Culture - Urine, Urine, Random Void  -     Urinalysis With Microscopic - Urine, Random Void    2. Screening for genitourinary condition  -     POC Urinalysis Dipstick    3. Encounter for screening mammogram for malignant neoplasm of breast  -     Mammo Screening Bilateral  With CAD; Future    4. Female genital prolapse, unspecified type    5. Urethral caruncle    Other orders  -     estradiol (ESTRACE) 0.1 MG/GM vaginal cream; Apply pea sized amount to urethra twice a week before bedtime  Dispense: 45 g; Refill: 2        Sandi Baker MD  12/27/2021        14:22 EST

## 2021-12-28 LAB
APPEARANCE UR: CLEAR
BACTERIA #/AREA URNS HPF: ABNORMAL /[HPF]
BACTERIA UR CULT: NORMAL
BACTERIA UR CULT: NORMAL
BILIRUB UR QL STRIP: NEGATIVE
CASTS URNS QL MICRO: ABNORMAL /LPF
COLOR UR: YELLOW
CRYSTALS URNS MICRO: ABNORMAL
EPI CELLS #/AREA URNS HPF: ABNORMAL /HPF (ref 0–10)
GLUCOSE UR QL: NEGATIVE
HGB UR QL STRIP: NEGATIVE
KETONES UR QL STRIP: ABNORMAL
LEUKOCYTE ESTERASE UR QL STRIP: NEGATIVE
MICRO URNS: ABNORMAL
MICRO URNS: ABNORMAL
NITRITE UR QL STRIP: NEGATIVE
PH UR STRIP: 5.5 [PH] (ref 5–7.5)
PROT UR QL STRIP: NEGATIVE
RBC #/AREA URNS HPF: ABNORMAL /HPF (ref 0–2)
SP GR UR: 1.02 (ref 1–1.03)
UNIDENT CRYS URNS QL MICRO: PRESENT
UROBILINOGEN UR STRIP-MCNC: 0.2 MG/DL (ref 0.2–1)
WBC #/AREA URNS HPF: ABNORMAL /HPF (ref 0–5)

## 2021-12-29 ENCOUNTER — TRANSCRIBE ORDERS (OUTPATIENT)
Dept: OBSTETRICS AND GYNECOLOGY | Facility: CLINIC | Age: 61
End: 2021-12-29

## 2021-12-29 DIAGNOSIS — Z12.31 SCREENING MAMMOGRAM, ENCOUNTER FOR: Primary | ICD-10-CM

## 2022-01-20 ENCOUNTER — HOSPITAL ENCOUNTER (OUTPATIENT)
Dept: MAMMOGRAPHY | Facility: HOSPITAL | Age: 62
Discharge: HOME | End: 2022-01-20
Admitting: OBSTETRICS & GYNECOLOGY

## 2022-01-20 DIAGNOSIS — Z12.31 SCREENING MAMMOGRAM, ENCOUNTER FOR: ICD-10-CM

## 2022-01-20 PROCEDURE — 77067 SCR MAMMO BI INCL CAD: CPT

## 2022-01-20 PROCEDURE — 77063 BREAST TOMOSYNTHESIS BI: CPT

## 2022-02-14 ENCOUNTER — LAB (OUTPATIENT)
Dept: OBSTETRICS AND GYNECOLOGY | Facility: CLINIC | Age: 62
End: 2022-02-14

## 2022-02-14 DIAGNOSIS — R31.9 HEMATURIA, UNSPECIFIED TYPE: Primary | ICD-10-CM

## 2022-02-16 LAB
APPEARANCE UR: CLEAR
BACTERIA #/AREA URNS HPF: ABNORMAL /[HPF]
BACTERIA UR CULT: NORMAL
BACTERIA UR CULT: NORMAL
BILIRUB UR QL STRIP: NEGATIVE
CASTS URNS QL MICRO: ABNORMAL /LPF
COLOR UR: YELLOW
EPI CELLS #/AREA URNS HPF: ABNORMAL /HPF (ref 0–10)
GLUCOSE UR QL STRIP: NEGATIVE
HGB UR QL STRIP: NEGATIVE
KETONES UR QL STRIP: NEGATIVE
LEUKOCYTE ESTERASE UR QL STRIP: ABNORMAL
MICRO URNS: ABNORMAL
NITRITE UR QL STRIP: NEGATIVE
PH UR STRIP: 6.5 [PH] (ref 5–7.5)
PROT UR QL STRIP: ABNORMAL
RBC #/AREA URNS HPF: ABNORMAL /HPF (ref 0–2)
SP GR UR STRIP: 1.02 (ref 1–1.03)
UROBILINOGEN UR STRIP-MCNC: 0.2 MG/DL (ref 0.2–1)
WBC #/AREA URNS HPF: ABNORMAL /HPF (ref 0–5)

## 2022-04-05 ENCOUNTER — TELEPHONE (OUTPATIENT)
Dept: OBSTETRICS AND GYNECOLOGY | Facility: CLINIC | Age: 62
End: 2022-04-05

## 2022-04-05 NOTE — TELEPHONE ENCOUNTER
Caller: Pamela Mack    Relationship to patient: Self    Best call back number: [phone] IF CALLING TODAY (4/5/22) -[phone] IF CALLING TOMORROW (4/6/22)    Type of visit: OFFICE VISIT (RTO IN 3 MOS) - LAST VISIT 12/27/21    Requested date: ASAP    If rescheduling, when is the original appointment: 4/11/22    Additional notes: PT IS RETURNING A CALL TO R/S HER APPT W/ DR BURCH ON 4/11/22. SHE STATED THIS IS THE 2ND TIME THE OFFICE HAS R/S HER APPT, AND SHE DOESN'T WANT TO WAIT ANY LONGER. CURRENTLY NEXT AVAIL APPT IS 9/12/22 AND THE PT IS NOT OKAY WITH THAT. HUB UNABLE TO WARM TRANSFER CALL TO PRACTICE.

## 2022-05-09 ENCOUNTER — OFFICE VISIT (OUTPATIENT)
Dept: OBSTETRICS AND GYNECOLOGY | Facility: CLINIC | Age: 62
End: 2022-05-09

## 2022-05-09 VITALS
SYSTOLIC BLOOD PRESSURE: 132 MMHG | BODY MASS INDEX: 34.23 KG/M2 | DIASTOLIC BLOOD PRESSURE: 78 MMHG | HEIGHT: 66 IN | WEIGHT: 213 LBS

## 2022-05-09 DIAGNOSIS — R31.9 HEMATURIA, UNSPECIFIED TYPE: Primary | ICD-10-CM

## 2022-05-09 DIAGNOSIS — Z12.31 ENCOUNTER FOR SCREENING MAMMOGRAM FOR MALIGNANT NEOPLASM OF BREAST: ICD-10-CM

## 2022-05-09 DIAGNOSIS — N81.9 FEMALE GENITAL PROLAPSE, UNSPECIFIED TYPE: ICD-10-CM

## 2022-05-09 DIAGNOSIS — Z11.51 SPECIAL SCREENING EXAMINATION FOR HUMAN PAPILLOMAVIRUS (HPV): ICD-10-CM

## 2022-05-09 DIAGNOSIS — Z01.419 ROUTINE GYNECOLOGICAL EXAMINATION: ICD-10-CM

## 2022-05-09 DIAGNOSIS — Z01.419 PAP SMEAR, LOW-RISK: ICD-10-CM

## 2022-05-09 PROCEDURE — 99213 OFFICE O/P EST LOW 20 MIN: CPT | Performed by: OBSTETRICS & GYNECOLOGY

## 2022-05-09 PROCEDURE — 99396 PREV VISIT EST AGE 40-64: CPT | Performed by: OBSTETRICS & GYNECOLOGY

## 2022-05-09 RX ORDER — CHLORHEXIDINE GLUCONATE 0.12 MG/ML
RINSE ORAL
COMMUNITY
End: 2023-02-10 | Stop reason: HOSPADM

## 2022-05-09 NOTE — PROGRESS NOTES
GYN Exam    CC- Here for annual and f/u bladder prolapse    Pamela Mack is a 61 y.o. female est pt who presents for annual exam and further discussion of pelvic prolapse. She was last seen in 2021 as a new pt to discuss pelvic prolapse. She had an ablation in her late 40s and has not had any bleeding since that time.   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  She does not notice this bulge on a daily basis.  She is not had any postmenopausal bleeding and she does not have any bladder incontinence or fecal incontinence.  She is not sexually active at present.  She was started on Estrace cream for urethral caruncle.  She has not been using this regularly as she was unsure what it was for.  At her last exam, her prolapse symptoms were much more significant than her exam would suggest.  Her pelvic ultrasound was normal.  I had seen her in the morning when she had not been working at her last exam.  Today, she has much more significant prolapse on exam after being at work all day.  She remains undecided about treatment at this time.  She has noticed that her symptoms are much better if she avoids heavy lifting.  We can discuss treatment options including pelvic floor PT, pessary as well as surgery.  She will call back if she makes a decision about therapy.  At her last visit, she had blood in her urine on dip and crystals on culture.  We will repeat this today.    OB History        1    Para   1    Term   1            AB        Living   1       SAB        IAB        Ectopic        Molar        Multiple        Live Births              Obstetric Comments   1  9.2 pound infant             Menarche:13  Menopause:ablation mid 40s  HRT:none  Current contraception: post menopausal status  History of abnormal Pap smear: no  History of abnormal mammogram: no  Family history of  uterine, colon or ovarian cancer: no  Family history of breast cancer: no  STD's: none   Last pap smear:1-2 years  Gardasil: missed  MITZI: none   POP      Health Maintenance   Topic Date Due   • Annual Gynecologic Pelvic and Breast Exam  Never done   • ANNUAL PHYSICAL  Never done   • HEPATITIS C SCREENING  Never done   • PAP SMEAR  Never done   • COVID-19 Vaccine (3 - Booster) 08/24/2021   • INFLUENZA VACCINE  08/01/2022   • MAMMOGRAM  01/20/2024   • COLORECTAL CANCER SCREENING  02/11/2024   • TDAP/TD VACCINES (2 - Td or Tdap) 11/18/2025   • ZOSTER VACCINE  Completed   • Pneumococcal Vaccine 0-64  Aged Out       Past Medical History:   Diagnosis Date   • Anxiety    • Colon polyp    • Fracture of wrist        Past Surgical History:   Procedure Laterality Date   • AVULSION TOENAIL PLATE Bilateral    • COLONOSCOPY     • GALLBLADDER SURGERY           Current Outpatient Medications:   •  ALPRAZolam (XANAX) 0.25 MG tablet, Xanax 0.25 mg tablet  Take 1 tablet every day by oral route as needed., Disp: , Rfl:   •  Calcium Carbonate-Vitamin D 600-200 MG-UNIT tablet, Every 12 (Twelve) Hours., Disp: , Rfl:   •  cetirizine (zyrTEC) 10 MG tablet, cetirizine 10 mg tablet  Take 1 tablet every day by oral route., Disp: , Rfl:   •  fluticasone (FLONASE) 50 MCG/ACT nasal spray, fluticasone propionate 50 mcg/actuation nasal spray,suspension  1-2 sprays each nostril daily, Disp: , Rfl:   •  chlorhexidine (PERIDEX) 0.12 % solution, chlorhexidine gluconate 0.12 % mouthwash  USE AS DIRECTED, Disp: , Rfl:   •  estradiol (ESTRACE) 0.1 MG/GM vaginal cream, Apply pea sized amount to urethra twice a week before bedtime, Disp: 45 g, Rfl: 2  •  meloxicam (MOBIC) 7.5 MG tablet, Take 1 tablet by mouth Daily., Disp: 30 tablet, Rfl: 0    Allergies   Allergen Reactions   • Erythromycin Myalgia       Social History     Tobacco Use   • Smoking status: Former Smoker   • Smokeless tobacco: Never Used   Vaping Use   • Vaping Use: Never used   Substance Use  "Topics   • Alcohol use: No   • Drug use: No       Family History   Problem Relation Age of Onset   • Breast cancer Neg Hx    • Ovarian cancer Neg Hx    • Uterine cancer Neg Hx    • Colon cancer Neg Hx    • Deep vein thrombosis Neg Hx    • Pulmonary embolism Neg Hx        Review of Systems   Constitutional: Positive for activity change. Negative for appetite change, fatigue, fever and unexpected weight change.   Eyes: Negative for photophobia and visual disturbance.   Respiratory: Negative for cough and shortness of breath.    Cardiovascular: Negative for chest pain and palpitations.   Gastrointestinal: Negative for abdominal distention, abdominal pain, constipation, diarrhea and nausea.   Endocrine: Negative for cold intolerance and heat intolerance.   Genitourinary: Positive for vaginal pain (bulge). Negative for dyspareunia, dysuria, menstrual problem, pelvic pain and vaginal discharge.   Musculoskeletal: Negative for back pain.   Skin: Negative for color change and rash.   Neurological: Negative for headaches.   Hematological: Negative for adenopathy. Does not bruise/bleed easily.   Psychiatric/Behavioral: Negative for dysphoric mood. The patient is not nervous/anxious.           /78   Ht 167.6 cm (66\")   Wt 96.6 kg (213 lb)   BMI 34.38 kg/m²     Physical Exam  Vitals and nursing note reviewed. Exam conducted with a chaperone present.   Constitutional:       Appearance: Normal appearance. She is well-developed. She is obese.   HENT:      Head: Normocephalic and atraumatic.   Eyes:      General: No scleral icterus.     Conjunctiva/sclera: Conjunctivae normal.   Neck:      Thyroid: No thyromegaly.   Cardiovascular:      Rate and Rhythm: Normal rate and regular rhythm.   Pulmonary:      Breath sounds: Normal breath sounds.   Chest:   Breasts:      Right: No swelling, bleeding, inverted nipple, mass, nipple discharge, skin change or tenderness.      Left: No swelling, bleeding, inverted nipple, mass, nipple " discharge, skin change or tenderness.       Abdominal:      General: There is no distension.      Palpations: Abdomen is soft. There is no mass.      Tenderness: There is no abdominal tenderness. There is no guarding or rebound.      Hernia: No hernia is present.   Genitourinary:     Labia:         Right: No rash, tenderness, lesion or injury.         Left: No rash, tenderness, lesion or injury.       Urethra: Prolapse present. No urethral pain, urethral swelling or urethral lesion.      Vagina: No signs of injury and foreign body. Prolapsed vaginal walls present. No vaginal discharge, erythema, tenderness, bleeding or lesions.      Cervix: No cervical motion tenderness, discharge, friability, lesion, erythema, cervical bleeding or eversion.      Uterus: Normal. With uterine prolapse.       Adnexa: Right adnexa normal.      Rectum: Normal.          Comments: Exam limited due to habitus  Grade 2-3 cystocele and rectocele  Musculoskeletal:      Cervical back: Neck supple.   Skin:     General: Skin is warm and dry.   Neurological:      Mental Status: She is alert and oriented to person, place, and time.   Psychiatric:         Mood and Affect: Mood normal.         Behavior: Behavior normal.         Thought Content: Thought content normal.         Judgment: Judgment normal.            Assessment/Plan  1) POP- pt has grade 2-3 cystocele and rectocele on exam.  We again discussed treatment options and she is uncertain how she would like to proceed at this time.  She will call back if she makes a decision regarding therapy  2) GYN HM: check pap/HPV  SBE demonstrated and encouraged.  3) STD screening: declines Condoms encouraged.  4) Bone health - Weight bearing exercise, dietary calcium recommendations and vitamin D reviewed.   5) Diet and Exercise discussed  6) Smoking Status: No  7) Social: no issues  8)MMG: UTD 1/2022 B2. Schedule MMG 1/2023  9) DEXA-UTD 12/2019- normal BMD, repeat in 3-5 years  10)C scope-UTD 2/2019-  repeat 5 years   11) Hematuria- check UA and CS.  Discussed that if she continues to have hematuria on repeat urinalysis, she will need referral to urology and she voices understanding.  12) urethral caruncle-we discussed that this can contribute to hematuria.  Rx for Estrace, pea-sized amount to urethra twice a week before bedtime.Patient counseled that vaginal estrogen rings, creams and tablets are available and highly effective at treating local vaginal symptoms such as atrophy and vaginal dryness.  Vaginal estrogen does not cause uterine overgrowth and does not require a progestogen to protect the uterus.  Very small amounts of estrogen are absorbed systemically.  For patients with a history of an estrogen dependent cancer such as breast cancer, the decision to use local estrogen for local vaginal symptoms should be made after consultation with her oncologist.  Possible side effects include local irritation or burning and/or vaginal bleeding and should always be reported.    13)Parts of this document have been copied or forwarded from her previous visits and have been reviewed, updated and edited as indicated.    14) I saw the patient with a face mask, gloves and eye protection  The patient herself was masked.  Social distancing was observed as appropriate.  15)  I spent > 20 minutes on the separately reported service of POP. This time is not included in the time used to support the annual E/M service also reported today.].         Diagnoses and all orders for this visit:    1. Hematuria, unspecified type (Primary)  -     Urine Culture - Urine, Urine, Random Void  -     Urinalysis With Microscopic - Urine, Random Void    2. Routine gynecological examination  -     Cancel: POC Urinalysis Dipstick  -     IgP, Aptima HPV    3. Pap smear, low-risk  -     IgP, Aptima HPV    4. Special screening examination for human papillomavirus (HPV)  -     IgP, Aptima HPV    5. Encounter for screening mammogram for malignant  neoplasm of breast  -     Mammo Screening Bilateral With CAD; Future    6. Female genital prolapse, unspecified type    Other orders  -     Microscopic Examination -        Sandi Baker MD  5/9/2022        13:10 EDT

## 2022-05-10 LAB
APPEARANCE UR: CLEAR
BACTERIA #/AREA URNS HPF: NORMAL /[HPF]
BACTERIA UR CULT: NO GROWTH
BACTERIA UR CULT: NORMAL
BILIRUB UR QL STRIP: NEGATIVE
CASTS URNS QL MICRO: NORMAL /LPF
COLOR UR: YELLOW
EPI CELLS #/AREA URNS HPF: NORMAL /HPF (ref 0–10)
GLUCOSE UR QL STRIP: NEGATIVE
HGB UR QL STRIP: ABNORMAL
KETONES UR QL STRIP: NEGATIVE
LEUKOCYTE ESTERASE UR QL STRIP: ABNORMAL
MICRO URNS: ABNORMAL
NITRITE UR QL STRIP: NEGATIVE
PH UR STRIP: 6 [PH] (ref 5–7.5)
PROT UR QL STRIP: NEGATIVE
RBC #/AREA URNS HPF: NORMAL /HPF (ref 0–2)
SP GR UR STRIP: 1.01 (ref 1–1.03)
UROBILINOGEN UR STRIP-MCNC: 0.2 MG/DL (ref 0.2–1)
WBC #/AREA URNS HPF: NORMAL /HPF (ref 0–5)

## 2022-05-13 LAB
CYTOLOGIST CVX/VAG CYTO: NORMAL
CYTOLOGY CVX/VAG DOC CYTO: NORMAL
CYTOLOGY CVX/VAG DOC THIN PREP: NORMAL
DX ICD CODE: NORMAL
HIV 1 & 2 AB SER-IMP: NORMAL
HPV I/H RISK 4 DNA CVX QL PROBE+SIG AMP: NEGATIVE
OTHER STN SPEC: NORMAL
STAT OF ADQ CVX/VAG CYTO-IMP: NORMAL

## 2022-05-15 DIAGNOSIS — R31.9 HEMATURIA, UNSPECIFIED TYPE: Primary | ICD-10-CM

## 2022-06-06 ENCOUNTER — TELEPHONE (OUTPATIENT)
Dept: ORTHOPEDIC SURGERY | Facility: CLINIC | Age: 62
End: 2022-06-06

## 2022-07-18 ENCOUNTER — OFFICE VISIT (OUTPATIENT)
Dept: ORTHOPEDIC SURGERY | Facility: CLINIC | Age: 62
End: 2022-07-18

## 2022-07-18 VITALS — HEIGHT: 66 IN | WEIGHT: 213 LBS | BODY MASS INDEX: 34.23 KG/M2

## 2022-07-18 DIAGNOSIS — M94.262 CHONDROMALACIA OF KNEE, LEFT: ICD-10-CM

## 2022-07-18 DIAGNOSIS — M94.261 CHONDROMALACIA OF KNEE, RIGHT: Primary | ICD-10-CM

## 2022-07-18 PROCEDURE — 99213 OFFICE O/P EST LOW 20 MIN: CPT | Performed by: ORTHOPAEDIC SURGERY

## 2022-07-18 PROCEDURE — 20610 DRAIN/INJ JOINT/BURSA W/O US: CPT | Performed by: ORTHOPAEDIC SURGERY

## 2022-07-18 PROCEDURE — 73562 X-RAY EXAM OF KNEE 3: CPT | Performed by: ORTHOPAEDIC SURGERY

## 2022-07-18 RX ADMIN — TRIAMCINOLONE ACETONIDE 80 MG: 40 INJECTION, SUSPENSION INTRA-ARTICULAR; INTRAMUSCULAR at 16:18

## 2022-07-18 RX ADMIN — LIDOCAINE HYDROCHLORIDE 8 ML: 10 INJECTION, SOLUTION EPIDURAL; INFILTRATION; INTRACAUDAL; PERINEURAL at 16:18

## 2022-07-18 NOTE — PROGRESS NOTES
The patient has consented to being recorded using [facility].    Subjective:     Patient ID: Pamela Mack is a 61 y.o. female.    Chief Complaint:  Follow-up right knee pain, chondromalacia, DJD  Left knee pain, new issue  Last injection-10/25/2021-right knee-cortisone    History of Present Illness  Pamela Mack returns to clinic today for evaluation of bilateral knee pain, right worse than left.    The patient rates her pain 8 out of 10. She localizes her pain to the anterior and medial aspects of her bilateral knees. The patient notes radiating pain down her right lower extremity. Her pain is exacerbated with bending, getting up from a chair, and sitting in a car for prolonged periods of time. The patient received a right knee corticosteroid injection in 10/2021, with significant relief.    Her father recently passed away due to cancer.        Social History     Occupational History   • Not on file   Tobacco Use   • Smoking status: Former Smoker   • Smokeless tobacco: Never Used   Vaping Use   • Vaping Use: Never used   Substance and Sexual Activity   • Alcohol use: No   • Drug use: No   • Sexual activity: Not Currently     Birth control/protection: Post-menopausal      Past Medical History:   Diagnosis Date   • Anxiety    • Colon polyp    • Fracture of wrist      Past Surgical History:   Procedure Laterality Date   • AVULSION TOENAIL PLATE Bilateral    • COLONOSCOPY     • GALLBLADDER SURGERY         Family History   Problem Relation Age of Onset   • Breast cancer Neg Hx    • Ovarian cancer Neg Hx    • Uterine cancer Neg Hx    • Colon cancer Neg Hx    • Deep vein thrombosis Neg Hx    • Pulmonary embolism Neg Hx      Review of Systems   Constitutional: Negative.    HENT: Negative.    Eyes: Negative.    Respiratory: Negative.    Cardiovascular: Negative.    Gastrointestinal: Negative.    Endocrine: Negative.    Genitourinary: Negative.    Musculoskeletal: Negative.    Skin: Negative.    Allergic/Immunologic: Negative.   "  Neurological: Negative.    Hematological: Negative.    Psychiatric/Behavioral: Negative.    All other systems reviewed and are negative.        Objective:  Vitals:    07/18/22 1515   Weight: 96.6 kg (213 lb)   Height: 167.6 cm (65.98\")         07/18/22  1515   Weight: 96.6 kg (213 lb)     Body mass index is 34.4 kg/m².  Physical Exam    Vital signs reviewed.   General: No acute distress, alert and oriented  Eyes: conjunctiva clear; pupils equally round and reactive  ENT: external ears and nose atraumatic; oropharynx clear  CV: no peripheral edema  Resp: normal respiratory effort  Skin: no rashes or wounds; normal turgor  Psych: mood and affect appropriate; recent and remote memory intact          Ortho Exam     Left Knee-    ROM 0 to 125 degrees  4+/5 on flexion  4+/5 on extension  Slight valgus alignment.  Liu's exam- Minimally positive to the lateral joint line.    Effusion- moderate   Grade 1A Lachman  Anterior drawer- Negative  Posterior drawer- Negative  Stable opening on varus and valgus stress at 0 and 30  Active patellar compression test- Moderately positive    Right Knee-    Valgus alignment.  ROM 0 to 120 degrees  4+/5 on flexion  4+/5 on extension  Lateral patellar position significantly positive.    Effusion- moderate   Grade 1A Lachman  Anterior drawer- Negative  Posterior drawer- Negative  Stable opening on varus and valgus stress at 2 and 30  Active patellar compression test- Significantly positive.    Imaging:  Bilateral Knee X-Ray  Indication: Pain    AP, Lateral, and Gueydan views    Findings:  No fracture  No bony lesion  Normal soft tissues  Moderate medial and advanced patellofemoral compartment joint space narrowing on the right, minimal joint space narrowing on the left.  Moderate reactive osteophyte formation particular along patellofemoral joint on the right noted    Compared to prior office x-rays    Assessment:      1. Chondromalacia of knee, right    2. Chondromalacia of knee, left "           Plan:  Large Joint Arthrocentesis  Date/Time: 7/18/2022 4:18 PM  Consent given by: patient  Site marked: site marked  Timeout: Immediately prior to procedure a time out was called to verify the correct patient, procedure, equipment, support staff and site/side marked as required   Supporting Documentation  Indications: pain   Procedure Details  Location: knee - Knee joint: bilateral knee.  Preparation: Patient was prepped and draped in the usual sterile fashion  Needle size: 22 G  Approach: superior (lateral)  Medications administered: 8 mL lidocaine PF 1% 1 %; 80 mg triamcinolone acetonide 40 MG/ML  Patient tolerance: patient tolerated the procedure well with no immediate complications            1. Discussed treatment options at length with patient at today's visit.  2. Given her significant recurrence of pain to her bilateral knees, right greater than left, we discussed options at this point in time including, but not limited to, repeat cortisone injection, viscosupplement injection, and total knee arthroplasty. At this point in time, she would like to proceed with repeat cortisone injection today given good prior success.  3. Patient would like to proceed with cortisone injection today to bilateral knees. Recommended limited use of affected extremity for the next 24 hours to only essential activites other than work on general active and passive motion. Recommended supplementing with ice and soft tissue massage. Discussed with patient that they should see results in 5-7 days, if no improvement in 5-6 weeks I have asked them to call the office to review other options. Patient should call office immediately if they notice redness, warmth, fevers, chills, or residual numbness or tingling for greater than 6 hours after injection.   4. Follow-up as-needed if she has recurrence of symptoms.      Pamela Mack was in agreement with plan and had all questions answered.     Orders:  Orders Placed This Encounter    Procedures   • Large Joint Arthrocentesis   • XR Knee 3 View Bilateral       Medications:  No orders of the defined types were placed in this encounter.      Followup:  Return if symptoms worsen or fail to improve.    Diagnoses and all orders for this visit:    1. Chondromalacia of knee, right (Primary)  -     Cancel: XR Knee 3 View Right  -     XR Knee 3 View Bilateral    2. Chondromalacia of knee, left  -     XR Knee 3 View Bilateral    Other orders  -     Large Joint Arthrocentesis        BMI is >= 30 and <35. (Class 1 Obesity). The following options were offered after discussion;: weight loss educational material (shared in after visit summary)     Dictated utilizing Dragon dictation     Transcribed from ambient dictation for Zion Taylor MD by George Valadez.  07/18/22   16:29 EDT    Patient verbalized consent to the visit recording.  I have personally performed the services described in this document as transcribed by the above individual, and it is both accurate and complete.  Zion Taylor MD  7/24/2022  15:07 EDT

## 2022-07-24 RX ORDER — LIDOCAINE HYDROCHLORIDE 10 MG/ML
8 INJECTION, SOLUTION EPIDURAL; INFILTRATION; INTRACAUDAL; PERINEURAL
Status: COMPLETED | OUTPATIENT
Start: 2022-07-18 | End: 2022-07-18

## 2022-07-24 RX ORDER — TRIAMCINOLONE ACETONIDE 40 MG/ML
80 INJECTION, SUSPENSION INTRA-ARTICULAR; INTRAMUSCULAR
Status: COMPLETED | OUTPATIENT
Start: 2022-07-18 | End: 2022-07-18

## 2022-08-10 ENCOUNTER — TRANSCRIBE ORDERS (OUTPATIENT)
Dept: ADMINISTRATIVE | Facility: HOSPITAL | Age: 62
End: 2022-08-10

## 2022-08-10 DIAGNOSIS — N81.9 FEMALE GENITAL PROLAPSE, UNSPECIFIED TYPE: Primary | ICD-10-CM

## 2022-08-31 ENCOUNTER — HOSPITAL ENCOUNTER (OUTPATIENT)
Dept: CT IMAGING | Facility: HOSPITAL | Age: 62
Discharge: HOME | End: 2022-08-31
Admitting: UROLOGY

## 2022-08-31 ENCOUNTER — APPOINTMENT (OUTPATIENT)
Dept: CT IMAGING | Facility: HOSPITAL | Age: 62
End: 2022-08-31

## 2022-08-31 DIAGNOSIS — N81.9 FEMALE GENITAL PROLAPSE, UNSPECIFIED TYPE: ICD-10-CM

## 2022-08-31 PROCEDURE — 25010000002 IOPAMIDOL 61 % SOLUTION: Performed by: UROLOGY

## 2022-08-31 PROCEDURE — 74178 CT ABD&PLV WO CNTR FLWD CNTR: CPT

## 2022-08-31 RX ADMIN — IOPAMIDOL 100 ML: 612 INJECTION, SOLUTION INTRAVENOUS at 08:10

## 2022-09-14 ENCOUNTER — TRANSCRIBE ORDERS (OUTPATIENT)
Dept: ADMINISTRATIVE | Facility: HOSPITAL | Age: 62
End: 2022-09-14

## 2022-09-14 DIAGNOSIS — R31.21 ASYMPTOMATIC MICROSCOPIC HEMATURIA: Primary | ICD-10-CM

## 2022-12-16 ENCOUNTER — TRANSCRIBE ORDERS (OUTPATIENT)
Dept: ADMINISTRATIVE | Facility: HOSPITAL | Age: 62
End: 2022-12-16

## 2022-12-16 DIAGNOSIS — Z12.31 SCREENING MAMMOGRAM FOR BREAST CANCER: Primary | ICD-10-CM

## 2023-02-01 ENCOUNTER — HOSPITAL ENCOUNTER (OUTPATIENT)
Dept: MAMMOGRAPHY | Facility: HOSPITAL | Age: 63
Discharge: HOME | End: 2023-02-01
Admitting: OBSTETRICS & GYNECOLOGY
Payer: COMMERCIAL

## 2023-02-01 DIAGNOSIS — Z12.31 SCREENING MAMMOGRAM FOR BREAST CANCER: ICD-10-CM

## 2023-02-01 PROCEDURE — 77067 SCR MAMMO BI INCL CAD: CPT

## 2023-02-01 PROCEDURE — 77063 BREAST TOMOSYNTHESIS BI: CPT

## 2023-02-05 ENCOUNTER — HOSPITAL ENCOUNTER (OUTPATIENT)
Facility: HOSPITAL | Age: 63
Discharge: HOME | End: 2023-02-10
Attending: EMERGENCY MEDICINE | Admitting: INTERNAL MEDICINE
Payer: COMMERCIAL

## 2023-02-05 ENCOUNTER — APPOINTMENT (OUTPATIENT)
Dept: CT IMAGING | Facility: HOSPITAL | Age: 63
End: 2023-02-05
Payer: COMMERCIAL

## 2023-02-05 DIAGNOSIS — K92.2 ACUTE LOWER GI BLEEDING: Primary | ICD-10-CM

## 2023-02-05 LAB
ABO GROUP BLD: NORMAL
ABO GROUP BLD: NORMAL
ALBUMIN SERPL-MCNC: 4.3 G/DL (ref 3.5–5.2)
ALBUMIN/GLOB SERPL: 1.5 G/DL
ALP SERPL-CCNC: 70 U/L (ref 39–117)
ALT SERPL W P-5'-P-CCNC: 11 U/L (ref 1–33)
ANION GAP SERPL CALCULATED.3IONS-SCNC: 11.2 MMOL/L (ref 5–15)
APTT PPP: 25.9 SECONDS (ref 24.3–38.1)
AST SERPL-CCNC: 17 U/L (ref 1–32)
BASOPHILS # BLD AUTO: 0.05 10*3/MM3 (ref 0–0.2)
BASOPHILS NFR BLD AUTO: 0.6 % (ref 0–1.5)
BILIRUB SERPL-MCNC: 0.4 MG/DL (ref 0–1.2)
BLD GP AB SCN SERPL QL: NEGATIVE
BUN SERPL-MCNC: 17 MG/DL (ref 8–23)
BUN/CREAT SERPL: 22.7 (ref 7–25)
CALCIUM SPEC-SCNC: 9.1 MG/DL (ref 8.6–10.5)
CHLORIDE SERPL-SCNC: 105 MMOL/L (ref 98–107)
CO2 SERPL-SCNC: 25.8 MMOL/L (ref 22–29)
CREAT SERPL-MCNC: 0.75 MG/DL (ref 0.57–1)
DEPRECATED RDW RBC AUTO: 44.5 FL (ref 37–54)
EGFRCR SERPLBLD CKD-EPI 2021: 90.1 ML/MIN/1.73
EOSINOPHIL # BLD AUTO: 0.16 10*3/MM3 (ref 0–0.4)
EOSINOPHIL NFR BLD AUTO: 1.8 % (ref 0.3–6.2)
ERYTHROCYTE [DISTWIDTH] IN BLOOD BY AUTOMATED COUNT: 13 % (ref 12.3–15.4)
GLOBULIN UR ELPH-MCNC: 2.9 GM/DL
GLUCOSE SERPL-MCNC: 108 MG/DL (ref 65–99)
HCT VFR BLD AUTO: 36.2 % (ref 34–46.6)
HCT VFR BLD AUTO: 38.4 % (ref 34–46.6)
HCT VFR BLD AUTO: 39 % (ref 34–46.6)
HGB BLD-MCNC: 11.5 G/DL (ref 12–15.9)
HGB BLD-MCNC: 12.6 G/DL (ref 12–15.9)
HGB BLD-MCNC: 12.7 G/DL (ref 12–15.9)
IMM GRANULOCYTES # BLD AUTO: 0.06 10*3/MM3 (ref 0–0.05)
IMM GRANULOCYTES NFR BLD AUTO: 0.7 % (ref 0–0.5)
INR PPP: 0.96 (ref 0.9–1.1)
LIPASE SERPL-CCNC: 28 U/L (ref 13–60)
LYMPHOCYTES # BLD AUTO: 1.75 10*3/MM3 (ref 0.7–3.1)
LYMPHOCYTES NFR BLD AUTO: 20.1 % (ref 19.6–45.3)
MCH RBC QN AUTO: 30.4 PG (ref 26.6–33)
MCHC RBC AUTO-ENTMCNC: 32.6 G/DL (ref 31.5–35.7)
MCV RBC AUTO: 93.3 FL (ref 79–97)
MONOCYTES # BLD AUTO: 0.56 10*3/MM3 (ref 0.1–0.9)
MONOCYTES NFR BLD AUTO: 6.4 % (ref 5–12)
NEUTROPHILS NFR BLD AUTO: 6.14 10*3/MM3 (ref 1.7–7)
NEUTROPHILS NFR BLD AUTO: 70.4 % (ref 42.7–76)
NRBC BLD AUTO-RTO: 0 /100 WBC (ref 0–0.2)
PLATELET # BLD AUTO: 327 10*3/MM3 (ref 140–450)
PMV BLD AUTO: 9.4 FL (ref 6–12)
POTASSIUM SERPL-SCNC: 4 MMOL/L (ref 3.5–5.2)
PROT SERPL-MCNC: 7.2 G/DL (ref 6–8.5)
PROTHROMBIN TIME: 12.9 SECONDS (ref 12.1–15)
RBC # BLD AUTO: 4.18 10*6/MM3 (ref 3.77–5.28)
RH BLD: NEGATIVE
RH BLD: NEGATIVE
SODIUM SERPL-SCNC: 142 MMOL/L (ref 136–145)
T&S EXPIRATION DATE: NORMAL
WBC NRBC COR # BLD: 8.72 10*3/MM3 (ref 3.4–10.8)

## 2023-02-05 PROCEDURE — 86901 BLOOD TYPING SEROLOGIC RH(D): CPT

## 2023-02-05 PROCEDURE — 85610 PROTHROMBIN TIME: CPT | Performed by: EMERGENCY MEDICINE

## 2023-02-05 PROCEDURE — 0 IOPAMIDOL PER 1 ML: Performed by: EMERGENCY MEDICINE

## 2023-02-05 PROCEDURE — 96360 HYDRATION IV INFUSION INIT: CPT

## 2023-02-05 PROCEDURE — G0378 HOSPITAL OBSERVATION PER HR: HCPCS

## 2023-02-05 PROCEDURE — 85018 HEMOGLOBIN: CPT | Performed by: EMERGENCY MEDICINE

## 2023-02-05 PROCEDURE — 85014 HEMATOCRIT: CPT | Performed by: FAMILY MEDICINE

## 2023-02-05 PROCEDURE — 86900 BLOOD TYPING SEROLOGIC ABO: CPT | Performed by: EMERGENCY MEDICINE

## 2023-02-05 PROCEDURE — 85014 HEMATOCRIT: CPT | Performed by: EMERGENCY MEDICINE

## 2023-02-05 PROCEDURE — 80053 COMPREHEN METABOLIC PANEL: CPT | Performed by: EMERGENCY MEDICINE

## 2023-02-05 PROCEDURE — 86900 BLOOD TYPING SEROLOGIC ABO: CPT

## 2023-02-05 PROCEDURE — 85025 COMPLETE CBC W/AUTO DIFF WBC: CPT | Performed by: EMERGENCY MEDICINE

## 2023-02-05 PROCEDURE — 85018 HEMOGLOBIN: CPT | Performed by: FAMILY MEDICINE

## 2023-02-05 PROCEDURE — 74177 CT ABD & PELVIS W/CONTRAST: CPT

## 2023-02-05 PROCEDURE — 96361 HYDRATE IV INFUSION ADD-ON: CPT

## 2023-02-05 PROCEDURE — 36415 COLL VENOUS BLD VENIPUNCTURE: CPT

## 2023-02-05 PROCEDURE — 85730 THROMBOPLASTIN TIME PARTIAL: CPT | Performed by: EMERGENCY MEDICINE

## 2023-02-05 PROCEDURE — 83690 ASSAY OF LIPASE: CPT | Performed by: EMERGENCY MEDICINE

## 2023-02-05 PROCEDURE — 86901 BLOOD TYPING SEROLOGIC RH(D): CPT | Performed by: EMERGENCY MEDICINE

## 2023-02-05 PROCEDURE — 99283 EMERGENCY DEPT VISIT LOW MDM: CPT

## 2023-02-05 PROCEDURE — 86850 RBC ANTIBODY SCREEN: CPT | Performed by: EMERGENCY MEDICINE

## 2023-02-05 RX ORDER — ACETAMINOPHEN 650 MG/1
650 SUPPOSITORY RECTAL EVERY 4 HOURS PRN
Status: DISCONTINUED | OUTPATIENT
Start: 2023-02-05 | End: 2023-02-10 | Stop reason: HOSPADM

## 2023-02-05 RX ORDER — ACETAMINOPHEN 160 MG/5ML
650 SOLUTION ORAL EVERY 4 HOURS PRN
Status: DISCONTINUED | OUTPATIENT
Start: 2023-02-05 | End: 2023-02-10 | Stop reason: HOSPADM

## 2023-02-05 RX ORDER — ALUMINA, MAGNESIA, AND SIMETHICONE 2400; 2400; 240 MG/30ML; MG/30ML; MG/30ML
15 SUSPENSION ORAL EVERY 6 HOURS PRN
Status: DISCONTINUED | OUTPATIENT
Start: 2023-02-05 | End: 2023-02-10 | Stop reason: HOSPADM

## 2023-02-05 RX ORDER — SODIUM CHLORIDE 0.9 % (FLUSH) 0.9 %
10 SYRINGE (ML) INJECTION AS NEEDED
Status: DISCONTINUED | OUTPATIENT
Start: 2023-02-05 | End: 2023-02-10 | Stop reason: HOSPADM

## 2023-02-05 RX ORDER — ONDANSETRON 4 MG/1
4 TABLET, FILM COATED ORAL EVERY 6 HOURS PRN
Status: DISCONTINUED | OUTPATIENT
Start: 2023-02-05 | End: 2023-02-10 | Stop reason: HOSPADM

## 2023-02-05 RX ORDER — ACETAMINOPHEN 325 MG/1
650 TABLET ORAL EVERY 4 HOURS PRN
Status: DISCONTINUED | OUTPATIENT
Start: 2023-02-05 | End: 2023-02-10 | Stop reason: HOSPADM

## 2023-02-05 RX ORDER — CHOLECALCIFEROL (VITAMIN D3) 125 MCG
5 CAPSULE ORAL NIGHTLY PRN
Status: DISCONTINUED | OUTPATIENT
Start: 2023-02-05 | End: 2023-02-10 | Stop reason: HOSPADM

## 2023-02-05 RX ORDER — ONDANSETRON 2 MG/ML
4 INJECTION INTRAMUSCULAR; INTRAVENOUS EVERY 6 HOURS PRN
Status: DISCONTINUED | OUTPATIENT
Start: 2023-02-05 | End: 2023-02-10 | Stop reason: HOSPADM

## 2023-02-05 RX ORDER — BISACODYL 5 MG/1
5 TABLET, DELAYED RELEASE ORAL DAILY PRN
Status: DISCONTINUED | OUTPATIENT
Start: 2023-02-05 | End: 2023-02-06

## 2023-02-05 RX ORDER — SODIUM CHLORIDE 0.9 % (FLUSH) 0.9 %
1-10 SYRINGE (ML) INJECTION AS NEEDED
Status: DISCONTINUED | OUTPATIENT
Start: 2023-02-05 | End: 2023-02-10 | Stop reason: HOSPADM

## 2023-02-05 RX ORDER — SODIUM CHLORIDE 9 MG/ML
40 INJECTION, SOLUTION INTRAVENOUS AS NEEDED
Status: DISCONTINUED | OUTPATIENT
Start: 2023-02-05 | End: 2023-02-10 | Stop reason: HOSPADM

## 2023-02-05 RX ORDER — SODIUM CHLORIDE 9 MG/ML
30 INJECTION, SOLUTION INTRAVENOUS CONTINUOUS
Status: DISCONTINUED | OUTPATIENT
Start: 2023-02-05 | End: 2023-02-06

## 2023-02-05 RX ORDER — CETIRIZINE HYDROCHLORIDE 10 MG/1
10 TABLET ORAL DAILY
Status: DISCONTINUED | OUTPATIENT
Start: 2023-02-05 | End: 2023-02-10 | Stop reason: HOSPADM

## 2023-02-05 RX ORDER — AMOXICILLIN 250 MG
2 CAPSULE ORAL 2 TIMES DAILY
Status: DISCONTINUED | OUTPATIENT
Start: 2023-02-05 | End: 2023-02-06

## 2023-02-05 RX ORDER — BISACODYL 10 MG
10 SUPPOSITORY, RECTAL RECTAL DAILY PRN
Status: DISCONTINUED | OUTPATIENT
Start: 2023-02-05 | End: 2023-02-06

## 2023-02-05 RX ORDER — ALPRAZOLAM 0.25 MG/1
0.25 TABLET ORAL 3 TIMES DAILY PRN
Status: DISCONTINUED | OUTPATIENT
Start: 2023-02-05 | End: 2023-02-10 | Stop reason: HOSPADM

## 2023-02-05 RX ORDER — POLYETHYLENE GLYCOL 3350 17 G/17G
17 POWDER, FOR SOLUTION ORAL DAILY PRN
Status: DISCONTINUED | OUTPATIENT
Start: 2023-02-05 | End: 2023-02-06

## 2023-02-05 RX ORDER — SODIUM CHLORIDE 0.9 % (FLUSH) 0.9 %
10 SYRINGE (ML) INJECTION EVERY 12 HOURS SCHEDULED
Status: DISCONTINUED | OUTPATIENT
Start: 2023-02-05 | End: 2023-02-10 | Stop reason: HOSPADM

## 2023-02-05 RX ADMIN — SENNOSIDES AND DOCUSATE SODIUM 2 TABLET: 50; 8.6 TABLET ORAL at 21:04

## 2023-02-05 RX ADMIN — SODIUM CHLORIDE 250 ML/HR: 9 INJECTION, SOLUTION INTRAVENOUS at 15:40

## 2023-02-05 RX ADMIN — IOPAMIDOL 100 ML: 755 INJECTION, SOLUTION INTRAVENOUS at 17:39

## 2023-02-05 NOTE — ED NOTES
Pt ambulatory to the BR, hat placed in toilet, apx 200 l's bright blood collected, hardly any stool noted.

## 2023-02-05 NOTE — ED PROVIDER NOTES
"Subjective     History provided by:  Patient    History of Present Illness    · Chief complaint: Bloody stools    · Location: From the rectum    · Quality/Severity: The patient has had about 10 large dark red bloody stools today.    · Timing/Onset: Started this morning.    · Modifying Factors: No history of previous GI bleeds.    · Associated symptoms: Denies abdominal pain.  Denies nausea or vomiting.  Denies dizziness or lightheadedness.    · Narrative: The patient is a 62-year-old white female stating she has had about 10 large dark red bloody stools today.  She has no previous history of GI bleeds.  She does have a history of diverticulosis and receives regular colonoscopies by Dr. Kendrick with the last occurring 2019.  He denies any abdominal pain.    Review of Systems  Past Medical History:   Diagnosis Date   • Anxiety    • Colon polyp    • Fracture of wrist      /96 (Patient Position: Lying)   Pulse 94   Temp 98 °F (36.7 °C) (Oral)   Resp 20   Ht 167.6 cm (66\")   Wt 93.9 kg (207 lb)   SpO2 95%   BMI 33.41 kg/m²     Past Medical History:   Diagnosis Date   • Anxiety    • Colon polyp    • Fracture of wrist        Allergies   Allergen Reactions   • Erythromycin Myalgia       Past Surgical History:   Procedure Laterality Date   • AVULSION TOENAIL PLATE Bilateral    • COLONOSCOPY     • GALLBLADDER SURGERY         Family History   Problem Relation Age of Onset   • Breast cancer Neg Hx    • Ovarian cancer Neg Hx    • Uterine cancer Neg Hx    • Colon cancer Neg Hx    • Deep vein thrombosis Neg Hx    • Pulmonary embolism Neg Hx        Social History     Socioeconomic History   • Marital status:    Tobacco Use   • Smoking status: Former   • Smokeless tobacco: Never   Vaping Use   • Vaping Use: Never used   Substance and Sexual Activity   • Alcohol use: No   • Drug use: No   • Sexual activity: Not Currently     Birth control/protection: Post-menopausal           Objective   Physical Exam  Vitals " and nursing note reviewed.   Constitutional:       General: She is not in acute distress.     Appearance: Normal appearance. She is well-developed. She is obese. She is not ill-appearing, toxic-appearing or diaphoretic.      Comments: The patient appears healthy in no acute distress.  Review of her vital signs: She is afebrile with a temperature of 98, blood pressure elevated 157/96, heart rate slightly tachycardic 94, slightly tachypnea with a respiratory of 20 with a normal room air oxygen saturation of 95%.   HENT:      Head: Normocephalic and atraumatic.      Nose: Nose normal.      Mouth/Throat:      Mouth: Mucous membranes are moist.      Pharynx: Oropharynx is clear.   Eyes:      General: No scleral icterus.        Right eye: No discharge.         Left eye: No discharge.      Pupils: Pupils are equal, round, and reactive to light.   Neck:      Thyroid: No thyromegaly.      Vascular: No JVD.   Cardiovascular:      Rate and Rhythm: Normal rate and regular rhythm.      Heart sounds: Normal heart sounds. No murmur heard.  Pulmonary:      Effort: Pulmonary effort is normal.      Breath sounds: Normal breath sounds. No wheezing or rales.   Chest:      Chest wall: No tenderness.   Abdominal:      General: Bowel sounds are normal. There is no distension.      Palpations: Abdomen is soft.      Tenderness: There is no abdominal tenderness. There is no right CVA tenderness, left CVA tenderness, guarding or rebound.   Genitourinary:     Comments: No external hemorrhoids.  The patient has a large amount of dark red cass blood in the bedside commode.  Musculoskeletal:         General: No tenderness or deformity. Normal range of motion.      Cervical back: Normal range of motion and neck supple.   Lymphadenopathy:      Cervical: No cervical adenopathy.   Skin:     General: Skin is warm and dry.      Capillary Refill: Capillary refill takes less than 2 seconds.      Coloration: Skin is not pale.      Findings: No erythema  or rash.   Neurological:      General: No focal deficit present.      Mental Status: She is alert and oriented to person, place, and time.      Cranial Nerves: No cranial nerve deficit.      Coordination: Coordination normal.      Comments: No focal motor sensory deficit   Psychiatric:         Mood and Affect: Mood normal.         Behavior: Behavior normal.         Thought Content: Thought content normal.         Judgment: Judgment normal.         Procedures           ED Course  ED Course as of 02/05/23 1645   Sun Feb 05, 2023   1620 Reviewed the patient's laboratory studies: The patient's CMP has normal electrolytes, normal renal liver function test.  CBC has a normal white count 8.7 with a normal differential.  Hemoglobin and hematocrit are normal at 12.7/39.  Coagulation studies within normal limits.  The patient was typed and screened. [TP]   1627 The patient was administered IV normal saline 250 cc/h. [TP]   1627 Is my impression the patient likely has a diverticular bleed based on her history of diverticulosis and not having pain in her abdomen. [TP]   1627 16:25 the patient was discussed with her gastroenterologist Dr. Kendrick, who request the patient be admitted to observation by the hospitalist. [TP]   1639 16:37 patient discussed with Dr. Gong, who requested a repeat H&H and a CT of the abdomen be obtained.  He agreed to admit the patient observation. [TP]      ED Course User Index  [TP] Watson Barber MD                                           Medical Decision Making  Differential diagnosis patient's rectal bleeding would include the most likely etiology of diverticular bleed as she is a established diagnosis of diverticulosis.  Other possibilities include colon cancer, Crohn's disease, ulcerative colitis, AV valve formation, a brisk upper GI bleed, peptic ulcer disease.  The fact that the patient has painless lower GI bleeding with a documented history of diverticulosis makes that the most  likely.    Acute lower GI bleeding: chronic illness or injury  Amount and/or Complexity of Data Reviewed  Labs: ordered. Decision-making details documented in ED Course.  Radiology: ordered.  Discussion of management or test interpretation with external provider(s): Details documented in the ED course.    Risk  Prescription drug management.  Decision regarding hospitalization.          Final diagnoses:   Acute lower GI bleeding       ED Disposition  ED Disposition     ED Disposition   Decision to Admit    Condition   --    Comment   Level of Care: Telemetry [5]   Diagnosis: Acute lower GI bleeding [098899]   Admitting Physician: MARILU DICK [[id number]]   Bed Request Comments: Acute lower GI bleed secondary to diverticulosis.               No follow-up provider specified.       Medication List      No changes were made to your prescriptions during this visit.         Labs Reviewed   COMPREHENSIVE METABOLIC PANEL - Abnormal; Notable for the following components:       Result Value    Glucose 108 (*)     All other components within normal limits    Narrative:     GFR Normal >60  Chronic Kidney Disease <60  Kidney Failure <15     CBC WITH AUTO DIFFERENTIAL - Abnormal; Notable for the following components:    Immature Grans % 0.7 (*)     Immature Grans, Absolute 0.06 (*)     All other components within normal limits   PROTIME-INR - Normal    Narrative:     Therapeutic Ranges for INR: 2.0-3.0 (PT 20-30)                              2.5-3.5 (PT 25-34)   APTT - Normal    Narrative:     PTT = The equivalent PTT values for the therapeutic range of heparin levels at 0.1 to 0.7 U/ml are 53 to 110 seconds.     LIPASE - Normal   HEMOGLOBIN AND HEMATOCRIT, BLOOD   TYPE AND SCREEN   CBC AND DIFFERENTIAL    Narrative:     The following orders were created for panel order CBC & Differential.  Procedure                               Abnormality         Status                     ---------                                -----------         ------                     CBC Auto Differential[[id number]]        Abnormal            Final result                 Please view results for these tests on the individual orders.     CT Abdomen Pelvis With Contrast    (Results Pending)          Medication List      No changes were made to your prescriptions during this visit.              Watson Barber MD  02/05/23 0959

## 2023-02-05 NOTE — ED NOTES
Pt has been up several times to the bathroom with bloody diarrhea. Commode placed next to pt bed for pt safety

## 2023-02-05 NOTE — PLAN OF CARE
Goal Outcome Evaluation:  Plan of Care Reviewed With: patient        Progress: no change  Outcome Evaluation: admitted from ER for observation, A&Ox4, ra, denies pain/discomfort, at this time. iv fluids startd, and scd's placed, database completed and pt educated to room, call light system.

## 2023-02-06 LAB
ANION GAP SERPL CALCULATED.3IONS-SCNC: 10 MMOL/L (ref 5–15)
BUN SERPL-MCNC: 15 MG/DL (ref 8–23)
BUN/CREAT SERPL: 20.5 (ref 7–25)
CALCIUM SPEC-SCNC: 8.5 MG/DL (ref 8.6–10.5)
CHLORIDE SERPL-SCNC: 108 MMOL/L (ref 98–107)
CO2 SERPL-SCNC: 25 MMOL/L (ref 22–29)
CREAT SERPL-MCNC: 0.73 MG/DL (ref 0.57–1)
DEPRECATED RDW RBC AUTO: 45.8 FL (ref 37–54)
DEPRECATED RDW RBC AUTO: 46.3 FL (ref 37–54)
EGFRCR SERPLBLD CKD-EPI 2021: 93.1 ML/MIN/1.73
ERYTHROCYTE [DISTWIDTH] IN BLOOD BY AUTOMATED COUNT: 13 % (ref 12.3–15.4)
ERYTHROCYTE [DISTWIDTH] IN BLOOD BY AUTOMATED COUNT: 13.2 % (ref 12.3–15.4)
GLUCOSE SERPL-MCNC: 97 MG/DL (ref 65–99)
HCT VFR BLD AUTO: 34.7 % (ref 34–46.6)
HCT VFR BLD AUTO: 34.7 % (ref 34–46.6)
HGB BLD-MCNC: 11.1 G/DL (ref 12–15.9)
HGB BLD-MCNC: 11.2 G/DL (ref 12–15.9)
MCH RBC QN AUTO: 30.4 PG (ref 26.6–33)
MCH RBC QN AUTO: 30.6 PG (ref 26.6–33)
MCHC RBC AUTO-ENTMCNC: 32 G/DL (ref 31.5–35.7)
MCHC RBC AUTO-ENTMCNC: 32.3 G/DL (ref 31.5–35.7)
MCV RBC AUTO: 94.8 FL (ref 79–97)
MCV RBC AUTO: 95.1 FL (ref 79–97)
PLATELET # BLD AUTO: 295 10*3/MM3 (ref 140–450)
PLATELET # BLD AUTO: 304 10*3/MM3 (ref 140–450)
PMV BLD AUTO: 9 FL (ref 6–12)
PMV BLD AUTO: 9.4 FL (ref 6–12)
POTASSIUM SERPL-SCNC: 3.8 MMOL/L (ref 3.5–5.2)
RBC # BLD AUTO: 3.65 10*6/MM3 (ref 3.77–5.28)
RBC # BLD AUTO: 3.66 10*6/MM3 (ref 3.77–5.28)
SODIUM SERPL-SCNC: 143 MMOL/L (ref 136–145)
WBC NRBC COR # BLD: 6.89 10*3/MM3 (ref 3.4–10.8)
WBC NRBC COR # BLD: 7.53 10*3/MM3 (ref 3.4–10.8)

## 2023-02-06 PROCEDURE — 85027 COMPLETE CBC AUTOMATED: CPT | Performed by: FAMILY MEDICINE

## 2023-02-06 PROCEDURE — 96361 HYDRATE IV INFUSION ADD-ON: CPT

## 2023-02-06 PROCEDURE — G0378 HOSPITAL OBSERVATION PER HR: HCPCS

## 2023-02-06 PROCEDURE — 99203 OFFICE O/P NEW LOW 30 MIN: CPT | Performed by: INTERNAL MEDICINE

## 2023-02-06 PROCEDURE — 80048 BASIC METABOLIC PNL TOTAL CA: CPT | Performed by: FAMILY MEDICINE

## 2023-02-06 PROCEDURE — 85027 COMPLETE CBC AUTOMATED: CPT | Performed by: INTERNAL MEDICINE

## 2023-02-06 RX ADMIN — Medication 10 ML: at 08:41

## 2023-02-06 RX ADMIN — Medication 10 ML: at 20:59

## 2023-02-06 RX ADMIN — SODIUM CHLORIDE 30 ML/HR: 9 INJECTION, SOLUTION INTRAVENOUS at 09:49

## 2023-02-06 RX ADMIN — ALPRAZOLAM 0.25 MG: 0.25 TABLET ORAL at 20:59

## 2023-02-06 RX ADMIN — ACETAMINOPHEN 650 MG: 325 TABLET ORAL at 12:09

## 2023-02-06 RX ADMIN — CETIRIZINE HYDROCHLORIDE 10 MG: 10 TABLET, FILM COATED ORAL at 08:41

## 2023-02-06 RX ADMIN — SODIUM CHLORIDE 125 ML/HR: 9 INJECTION, SOLUTION INTRAVENOUS at 02:10

## 2023-02-06 NOTE — CASE MANAGEMENT/SOCIAL WORK
Discharge Planning Assessment   Almita Thompson     Patient Name: Pamela Mack  MRN: [id number]  Today's Date: 2/6/2023    Admit Date: 2/5/2023    Plan: plan home with son   Discharge Needs Assessment     Row Name 02/06/23 1428       Living Environment    People in Home child(isiah), adult    Current Living Arrangements home    Primary Care Provided by self    Provides Primary Care For no one    Family Caregiver if Needed child(isiah), adult    Quality of Family Relationships supportive    Able to Return to Prior Arrangements yes       Resource/Environmental Concerns    Resource/Environmental Concerns none    Transportation Concerns none       Food Insecurity    Within the past 12 months, you worried that your food would run out before you got the money to buy more. Never true    Within the past 12 months, the food you bought just didn't last and you didn't have money to get more. Never true       Transition Planning    Patient/Family Anticipates Transition to home with family    Patient/Family Anticipated Services at Transition none    Transportation Anticipated family or friend will provide       Discharge Needs Assessment    Readmission Within the Last 30 Days no previous admission in last 30 days    Equipment Currently Used at Home none    Concerns to be Addressed no discharge needs identified    Anticipated Changes Related to Illness none    Equipment Needed After Discharge none    Provided Post Acute Provider List? N/A    Provided Post Acute Provider Quality & Resource List? N/A               Discharge Plan     Row Name 02/06/23 1430       Plan    Plan plan home with son    Patient/Family in Agreement with Plan yes    Provided Post Acute Provider List? N/A    Provided Post Acute Provider Quality & Resource List? N/A    Plan Comments Spoke with patient at bedside, face sheet verified. Patient lives in a home with her son and is independent of ADLs including driving. She denies use of DME/HH/Rehab. She sees   [facility] as PCP and uses [facility] in [place]. She denies issues obtaining medictions. She states no concersn r/t housing,utilities, food or finances.  She plans to return home with her son to assist as needed. CM # placed on white board, will continue to follow.              Continued Care and Services - Admitted Since 2/5/2023    Coordination has not been started for this encounter.          Demographic Summary     Row Name 02/06/23 9048       General Information    Admission Type observation    Arrived From home    Referral Source admission list    Reason for Consult discharge planning    Preferred Language English       Contact Information    Permission Granted to Share Info With                Functional Status    No documentation.                Psychosocial    No documentation.                Abuse/Neglect    No documentation.                Legal    No documentation.                Substance Abuse    No documentation.                Patient Forms    No documentation.                   Emmanuel Jennings RN

## 2023-02-06 NOTE — CONSULTS
Patient Care Team:  Renata Gong MD as PCP - General  Renata Gong MD as PCP - Family Medicine    CHIEF COMPLAINT: Rectal bleeding    HISTORY OF PRESENT ILLNESS:  63 yo seen in the past for her Colonoscopy in 2010,2013,2019- Polyps found initially to be adenomatous have been Hyperplastic only in 2019, Pan-Colonic Diverticulosis and no recent bouts of Diverticulitis but with first occurrence of profuse and repeated rectal bleeding. Went >5 times since she has been her and her 5 am Bm was still bloody but her 9 Am was brown w/o Melena  And she feels wel lher initial HGB was 12 and this AM was 11.1, o/w normal labs no AC but is on Meloxicam. Denies Abdominal nor rectal pain.    Past Medical History:   Diagnosis Date   • Anxiety    • Arthritis    • Colon polyp    • Fracture of wrist      Past Surgical History:   Procedure Laterality Date   • AVULSION TOENAIL PLATE Bilateral    • COLONOSCOPY     • GALLBLADDER SURGERY       Family History   Problem Relation Age of Onset   • Cancer Father    • Hypertension Brother    • Breast cancer Neg Hx    • Ovarian cancer Neg Hx    • Uterine cancer Neg Hx    • Colon cancer Neg Hx    • Deep vein thrombosis Neg Hx    • Pulmonary embolism Neg Hx      Social History     Tobacco Use   • Smoking status: Former   • Smokeless tobacco: Never   Vaping Use   • Vaping Use: Never used   Substance Use Topics   • Alcohol use: No   • Drug use: No     Medications Prior to Admission   Medication Sig Dispense Refill Last Dose   • ALPRAZolam (XANAX) 0.25 MG tablet Xanax 0.25 mg tablet   Take 1 tablet every day by oral route as needed.   Past Month   • Calcium Carbonate-Vitamin D 600-200 MG-UNIT tablet Every 12 (Twelve) Hours.   Past Month   • cetirizine (zyrTEC) 10 MG tablet cetirizine 10 mg tablet   Take 1 tablet every day by oral route.   Past Month   • chlorhexidine (PERIDEX) 0.12 % solution chlorhexidine gluconate 0.12 % mouthwash   USE AS DIRECTED   Past Month   •  "estradiol (ESTRACE) 0.1 MG/GM vaginal cream Apply pea sized amount to urethra twice a week before bedtime 45 g 2 Past Month   • fluticasone (FLONASE) 50 MCG/ACT nasal spray fluticasone propionate 50 mcg/actuation nasal spray,suspension   1-2 sprays each nostril daily   2/5/2023   • meloxicam (MOBIC) 7.5 MG tablet Take 1 tablet by mouth Daily. 30 tablet 0 Past Month     Allergies:  Erythromycin    REVIEW OF SYSTEMS:  Please see the above history of present illness for pertinent positives and negatives.  The remainder of the patient's systems have been reviewed and are negative.     Vital Signs  Temp:  [97.7 °F (36.5 °C)-98.1 °F (36.7 °C)] 97.8 °F (36.6 °C)  Heart Rate:  [76-94] 76  Resp:  [16-20] 16  BP: (110-157)/(65-96) 113/71    Flowsheet Rows    Flowsheet Row First Filed Value   Admission Height 167.6 cm (66\") Documented at 02/05/2023 1244   Admission Weight 93.9 kg (207 lb) Documented at 02/05/2023 1244           Physical Exam:  Physical Exam   Constitutional: Patient appears well-developed and well-nourished and in no acute distress   HEENT:   Head: Normocephalic and atraumatic.   Eyes:  Pupils are equal, round, and reactive to light. EOM are intact. Sclerae are anicteric and non-injected.  Mouth and Throat: Patient has moist mucous membranes. Oropharynx is clear of any erythema or exudate.     Neck: Neck supple. No JVD present. No thyromegaly present. No lymphadenopathy present.  Cardiovascular: Regular rate, regular rhythm, S1 normal and S2 normal.  Exam reveals no gallop and no friction rub.  No murmur heard.  Pulmonary/Chest: Lungs are clear to auscultation bilaterally. No respiratory distress. No wheezes. No rhonchi. No rales.   Abdominal: Soft. Bowel sounds are normal. No distension and no mass. There is no hepatosplenomegaly. There is no tenderness.   Musculoskeletal: Normal Muscle tone  Extremities: No edema. Pulses are palpable in all 4 extremities.  Neurological: Patient is alert and oriented to " person, place, and time. Cranial nerves II-XII are grossly intact with no focal deficits.  Skin: Skin is warm. No rash noted. Nails show no clubbing.  No cyanosis or erythema.    Debilities/Disabilities Identified: None  Emotional Behavior: Appropriate     Results Review:   I reviewed the patient's new clinical results.    Lab Results (most recent)     Procedure Component Value Units Date/Time    Basic Metabolic Panel [[id number]]  (Abnormal) Collected: 02/06/23 0340    Specimen: Blood Updated: 02/06/23 0534     Glucose 97 mg/dL      BUN 15 mg/dL      Creatinine 0.73 mg/dL      Sodium 143 mmol/L      Potassium 3.8 mmol/L      Chloride 108 mmol/L      CO2 25.0 mmol/L      Calcium 8.5 mg/dL      BUN/Creatinine Ratio 20.5     Anion Gap 10.0 mmol/L      eGFR 93.1 mL/min/1.73     Narrative:      GFR Normal >60  Chronic Kidney Disease <60  Kidney Failure <15      CBC (No Diff) [[id number]]  (Abnormal) Collected: 02/06/23 0340    Specimen: Blood Updated: 02/06/23 0444     WBC 7.53 10*3/mm3      RBC 3.65 10*6/mm3      Hemoglobin 11.1 g/dL      Hematocrit 34.7 %      MCV 95.1 fL      MCH 30.4 pg      MCHC 32.0 g/dL      RDW 13.0 %      RDW-SD 46.3 fl      MPV 9.4 fL      Platelets 304 10*3/mm3     Hemoglobin & Hematocrit, Blood [[id number]]  (Abnormal) Collected: 02/05/23 2053    Specimen: Blood Updated: 02/05/23 2100     Hemoglobin 11.5 g/dL      Hematocrit 36.2 %     Hemoglobin & Hematocrit, Blood [[id number]]  (Normal) Collected: 02/05/23 1714    Specimen: Blood Updated: 02/05/23 1718     Hemoglobin 12.6 g/dL      Hematocrit 38.4 %     Comprehensive Metabolic Panel [[id number]]  (Abnormal) Collected: 02/05/23 1519    Specimen: Blood Updated: 02/05/23 1604     Glucose 108 mg/dL      BUN 17 mg/dL      Creatinine 0.75 mg/dL      Sodium 142 mmol/L      Potassium 4.0 mmol/L      Chloride 105 mmol/L      CO2 25.8 mmol/L      Calcium 9.1 mg/dL      Total Protein 7.2 g/dL      Albumin 4.3 g/dL      ALT (SGPT) 11 U/L      AST (SGOT) 17  U/L      Alkaline Phosphatase 70 U/L      Total Bilirubin 0.4 mg/dL      Globulin 2.9 gm/dL      A/G Ratio 1.5 g/dL      BUN/Creatinine Ratio 22.7     Anion Gap 11.2 mmol/L      eGFR 90.1 mL/min/1.73     Narrative:      GFR Normal >60  Chronic Kidney Disease <60  Kidney Failure <15      Lipase [[id number]]  (Normal) Collected: 02/05/23 1519    Specimen: Blood Updated: 02/05/23 1604     Lipase 28 U/L     Protime-INR [[id number]]  (Normal) Collected: 02/05/23 1519    Specimen: Blood Updated: 02/05/23 1556     Protime 12.9 Seconds      INR 0.96    Narrative:      Therapeutic Ranges for INR: 2.0-3.0 (PT 20-30)                              2.5-3.5 (PT 25-34)    aPTT [[id number]]  (Normal) Collected: 02/05/23 1519    Specimen: Blood Updated: 02/05/23 1556     PTT 25.9 seconds     Narrative:      PTT = The equivalent PTT values for the therapeutic range of heparin levels at 0.1 to 0.7 U/ml are 53 to 110 seconds.      CBC & Differential [[id number]]  (Abnormal) Collected: 02/05/23 1519    Specimen: Blood Updated: 02/05/23 1545    Narrative:      The following orders were created for panel order CBC & Differential.  Procedure                               Abnormality         Status                     ---------                               -----------         ------                     CBC Auto Differential[[id number]]        Abnormal            Final result                 Please view results for these tests on the individual orders.    CBC Auto Differential [[id number]]  (Abnormal) Collected: 02/05/23 1519    Specimen: Blood Updated: 02/05/23 1545     WBC 8.72 10*3/mm3      RBC 4.18 10*6/mm3      Hemoglobin 12.7 g/dL      Hematocrit 39.0 %      MCV 93.3 fL      MCH 30.4 pg      MCHC 32.6 g/dL      RDW 13.0 %      RDW-SD 44.5 fl      MPV 9.4 fL      Platelets 327 10*3/mm3      Neutrophil % 70.4 %      Lymphocyte % 20.1 %      Monocyte % 6.4 %      Eosinophil % 1.8 %      Basophil % 0.6 %      Immature Grans % 0.7 %       Neutrophils, Absolute 6.14 10*3/mm3      Lymphocytes, Absolute 1.75 10*3/mm3      Monocytes, Absolute 0.56 10*3/mm3      Eosinophils, Absolute 0.16 10*3/mm3      Basophils, Absolute 0.05 10*3/mm3      Immature Grans, Absolute 0.06 10*3/mm3      nRBC 0.0 /100 WBC           Imaging Results (Most Recent)     Procedure Component Value Units Date/Time    CT Abdomen Pelvis With Contrast [[id number]] Collected: 02/05/23 1806     Updated: 02/05/23 1808    Narrative:      CT Abdomen Pelvis W    INDICATION:   Lower GI bleed with diverticulosis;Gastrointestinal hemorrhage, unspecified    TECHNIQUE:   CT of the abdomen and pelvis with IV contrast. Coronal and sagittal reconstructions were obtained.  Radiation dose reduction techniques included automated exposure control or exposure modulation based on body size. Count of known CT and cardiac nuc med  studies performed in previous 12 months: 1.     COMPARISON:   CT abdomen and pelvis 8/31/2022.    FINDINGS:    Lower chest: Unremarkable.  Liver: Unremarkable.  Gallbladder and bile ducts: Surgically absent gallbladder with mildly dilated bile ducts, likely related to postcholecystectomy state.  Spleen: Unremarkable.  Pancreas: Unremarkable.  Adrenals: Unremarkable.  Kidneys and ureters: Small left-sided renal cysts one of which contains peripheral calcification or milk of calcium, unchanged. No hydronephrosis.  Bowel: Small hiatal hernia. Colonic diverticulosis without associated colonic wall thickening or surrounding stranding.Normal appendix.  Bladder: Unremarkable.  Reproductive organs: Normal uterus. No adnexal masses. Bilateral Essure devices.  Lymph nodes: Unremarkable.  Peritoneum: Unremarkable.  Vessels: Mild atherosclerosis.  Abdominal wall: Unremarkable.  Bones: Multilevel degenerative changes of the lumbar spine.      Impression:        1.  No acute CT abnormality in the abdomen or pelvis.  2.  Colonic diverticulosis without evidence of acute diverticulitis.      [place]  Name: Mk Watts MD   Signed: 2/5/2023 6:06 PM   Workstation Name: [id number]    [facility]        reviewed    ECG/EMG Results (most recent)     None        reviewed    Assessment & Plan   Rectal Bleding   Diverticulosis  PHX of Colon Polyps   Continue IVF, Clears and hold her meloxicam but this sure appears to be a Divertciicular bleed that may have already stopped. Following HGB and her bowels- at best will feed and D/C toomorrow Am, if no further bleeding.        I discussed the patient's findings and my recommendations with patient.     Celso Kendrick MD  02/06/23  10:14 EST    Time: Not Recorded

## 2023-02-06 NOTE — PLAN OF CARE
Goal Outcome Evaluation:  Plan of Care Reviewed With: patient        Progress: improving  Outcome Evaluation: Tolerating clear liquid diet;denies nausea or abd pain; continues with diarrhea (dark brown); saline locked now; HBG 11.2/34.7; labs in am; plan poss home tomorrow

## 2023-02-06 NOTE — H&P
HISTORY AND PHYSICAL      Patient Care Team:  Renata Gong MD as PCP - General  Renata Gong MD as PCP - Family Medicine    CHIEF COMPLAINT: Rectal bleeding    HISTORY OF PRESENT ILLNESS:    63 Y female relatively healthy history of diverticulosis in the emergency room with sudden onset of bright red blood blood per rectum.  She had several bowel movements with  and large amounts of blood.  She had a fever chills abdominal pain nausea vomiting.  She has known history of diverticulosis with last colonoscopy was 2019.    Past Medical History:   Diagnosis Date   • Anxiety    • Arthritis    • Colon polyp    • Fracture of wrist      Past Surgical History:   Procedure Laterality Date   • AVULSION TOENAIL PLATE Bilateral    • COLONOSCOPY     • GALLBLADDER SURGERY       Family History   Problem Relation Age of Onset   • Cancer Father    • Hypertension Brother    • Breast cancer Neg Hx    • Ovarian cancer Neg Hx    • Uterine cancer Neg Hx    • Colon cancer Neg Hx    • Deep vein thrombosis Neg Hx    • Pulmonary embolism Neg Hx      Social History     Tobacco Use   • Smoking status: Former   • Smokeless tobacco: Never   Vaping Use   • Vaping Use: Never used   Substance Use Topics   • Alcohol use: No   • Drug use: No     Medications Prior to Admission   Medication Sig Dispense Refill Last Dose   • ALPRAZolam (XANAX) 0.25 MG tablet Xanax 0.25 mg tablet   Take 1 tablet every day by oral route as needed.   Past Month   • Calcium Carbonate-Vitamin D 600-200 MG-UNIT tablet Every 12 (Twelve) Hours.   Past Month   • cetirizine (zyrTEC) 10 MG tablet cetirizine 10 mg tablet   Take 1 tablet every day by oral route.   Past Month   • chlorhexidine (PERIDEX) 0.12 % solution chlorhexidine gluconate 0.12 % mouthwash   USE AS DIRECTED   Past Month   • estradiol (ESTRACE) 0.1 MG/GM vaginal cream Apply pea sized amount to urethra twice a week before bedtime 45 g 2 Past Month   • fluticasone (FLONASE) 50 MCG/ACT  "nasal spray fluticasone propionate 50 mcg/actuation nasal spray,suspension   1-2 sprays each nostril daily   2/5/2023   • meloxicam (MOBIC) 7.5 MG tablet Take 1 tablet by mouth Daily. 30 tablet 0 Past Month     Allergies:  Erythromycin     Review of Systems   Constitutional: Negative for activity change, appetite change and fatigue.   HENT: Negative for congestion.    Respiratory: Negative for cough, chest tightness, shortness of breath and wheezing.    Cardiovascular: Negative for chest pain.   Gastrointestinal: Positive for blood in stool. Negative for abdominal distention, abdominal pain, diarrhea, nausea and vomiting.   Endocrine: Negative for polyphagia and polyuria.   Genitourinary: Negative for frequency.   Skin: Negative for rash.   Neurological: Negative for light-headedness.   Hematological: Does not bruise/bleed easily.   Psychiatric/Behavioral: Negative for agitation and behavioral problems.       Vital Signs  Temp:  [97.7 °F (36.5 °C)-98.1 °F (36.7 °C)] 97.8 °F (36.6 °C)  Heart Rate:  [76-94] 76  Resp:  [16-20] 16  BP: (110-157)/(65-96) 113/71  Oxygen Therapy  SpO2: 98 %  Pulse Oximetry Type: Continuous  Device (Oxygen Therapy): room air}  Body mass index is 33.57 kg/m².  Flowsheet Rows    Flowsheet Row First Filed Value   Admission Height 167.6 cm (66\") Documented at 02/05/2023 1244   Admission Weight 93.9 kg (207 lb) Documented at 02/05/2023 1244                 Physical Exam  Vitals and nursing note reviewed.   Constitutional:       Appearance: She is well-developed. She is obese.   HENT:      Head: Normocephalic.   Eyes:      Conjunctiva/sclera: Conjunctivae normal.   Neck:      Thyroid: No thyromegaly.      Vascular: No JVD.   Cardiovascular:      Rate and Rhythm: Normal rate and regular rhythm.      Heart sounds: Normal heart sounds. No murmur heard.  Pulmonary:      Effort: Pulmonary effort is normal. No respiratory distress.      Breath sounds: Normal breath sounds. No wheezing or rales. "   Abdominal:      General: Bowel sounds are normal. There is no distension.      Palpations: Abdomen is soft.      Tenderness: There is no abdominal tenderness. There is no guarding.   Skin:     General: Skin is warm and dry.      Findings: No rash.   Neurological:      Mental Status: She is alert.          Debilities/Disabilities Identified: None    Emotional Behavior: Appropriate    Result Review        I have personally reviewed the results from the time of this admission to 2/6/2023 08:00 EST and agree with these findings:  [x]  Laboratory  []  Microbiology  []  Radiology  []  EKG/Telemetry   []  Cardiology/Vascular   []  Pathology  [x]  Old records  []  Other:          Results Review:    I reviewed the patient's new clinical results.  Lab Results (most recent)     Procedure Component Value Units Date/Time    Basic Metabolic Panel [[id number]]  (Abnormal) Collected: 02/06/23 0340    Specimen: Blood Updated: 02/06/23 0534     Glucose 97 mg/dL      BUN 15 mg/dL      Creatinine 0.73 mg/dL      Sodium 143 mmol/L      Potassium 3.8 mmol/L      Chloride 108 mmol/L      CO2 25.0 mmol/L      Calcium 8.5 mg/dL      BUN/Creatinine Ratio 20.5     Anion Gap 10.0 mmol/L      eGFR 93.1 mL/min/1.73     Narrative:      GFR Normal >60  Chronic Kidney Disease <60  Kidney Failure <15      CBC (No Diff) [[id number]]  (Abnormal) Collected: 02/06/23 0340    Specimen: Blood Updated: 02/06/23 0444     WBC 7.53 10*3/mm3      RBC 3.65 10*6/mm3      Hemoglobin 11.1 g/dL      Hematocrit 34.7 %      MCV 95.1 fL      MCH 30.4 pg      MCHC 32.0 g/dL      RDW 13.0 %      RDW-SD 46.3 fl      MPV 9.4 fL      Platelets 304 10*3/mm3     Hemoglobin & Hematocrit, Blood [[id number]]  (Abnormal) Collected: 02/05/23 2053    Specimen: Blood Updated: 02/05/23 2100     Hemoglobin 11.5 g/dL      Hematocrit 36.2 %     Hemoglobin & Hematocrit, Blood [[id number]]  (Normal) Collected: 02/05/23 1714    Specimen: Blood Updated: 02/05/23 1718     Hemoglobin 12.6 g/dL       Hematocrit 38.4 %     Comprehensive Metabolic Panel [[id number]]  (Abnormal) Collected: 02/05/23 1519    Specimen: Blood Updated: 02/05/23 1604     Glucose 108 mg/dL      BUN 17 mg/dL      Creatinine 0.75 mg/dL      Sodium 142 mmol/L      Potassium 4.0 mmol/L      Chloride 105 mmol/L      CO2 25.8 mmol/L      Calcium 9.1 mg/dL      Total Protein 7.2 g/dL      Albumin 4.3 g/dL      ALT (SGPT) 11 U/L      AST (SGOT) 17 U/L      Alkaline Phosphatase 70 U/L      Total Bilirubin 0.4 mg/dL      Globulin 2.9 gm/dL      A/G Ratio 1.5 g/dL      BUN/Creatinine Ratio 22.7     Anion Gap 11.2 mmol/L      eGFR 90.1 mL/min/1.73     Narrative:      GFR Normal >60  Chronic Kidney Disease <60  Kidney Failure <15      Lipase [[id number]]  (Normal) Collected: 02/05/23 1519    Specimen: Blood Updated: 02/05/23 1604     Lipase 28 U/L     Protime-INR [[id number]]  (Normal) Collected: 02/05/23 1519    Specimen: Blood Updated: 02/05/23 1556     Protime 12.9 Seconds      INR 0.96    Narrative:      Therapeutic Ranges for INR: 2.0-3.0 (PT 20-30)                              2.5-3.5 (PT 25-34)    aPTT [[id number]]  (Normal) Collected: 02/05/23 1519    Specimen: Blood Updated: 02/05/23 1556     PTT 25.9 seconds     Narrative:      PTT = The equivalent PTT values for the therapeutic range of heparin levels at 0.1 to 0.7 U/ml are 53 to 110 seconds.      CBC & Differential [[id number]]  (Abnormal) Collected: 02/05/23 1519    Specimen: Blood Updated: 02/05/23 1545    Narrative:      The following orders were created for panel order CBC & Differential.  Procedure                               Abnormality         Status                     ---------                               -----------         ------                     CBC Auto Differential[[id number]]        Abnormal            Final result                 Please view results for these tests on the individual orders.    CBC Auto Differential [[id number]]  (Abnormal) Collected: 02/05/23 1519     Specimen: Blood Updated: 02/05/23 1545     WBC 8.72 10*3/mm3      RBC 4.18 10*6/mm3      Hemoglobin 12.7 g/dL      Hematocrit 39.0 %      MCV 93.3 fL      MCH 30.4 pg      MCHC 32.6 g/dL      RDW 13.0 %      RDW-SD 44.5 fl      MPV 9.4 fL      Platelets 327 10*3/mm3      Neutrophil % 70.4 %      Lymphocyte % 20.1 %      Monocyte % 6.4 %      Eosinophil % 1.8 %      Basophil % 0.6 %      Immature Grans % 0.7 %      Neutrophils, Absolute 6.14 10*3/mm3      Lymphocytes, Absolute 1.75 10*3/mm3      Monocytes, Absolute 0.56 10*3/mm3      Eosinophils, Absolute 0.16 10*3/mm3      Basophils, Absolute 0.05 10*3/mm3      Immature Grans, Absolute 0.06 10*3/mm3      nRBC 0.0 /100 WBC           Imaging Results (Most Recent)     Procedure Component Value Units Date/Time    CT Abdomen Pelvis With Contrast [[id number]] Collected: 02/05/23 1806     Updated: 02/05/23 1808    Narrative:      CT Abdomen Pelvis W    INDICATION:   Lower GI bleed with diverticulosis;Gastrointestinal hemorrhage, unspecified    TECHNIQUE:   CT of the abdomen and pelvis with IV contrast. Coronal and sagittal reconstructions were obtained.  Radiation dose reduction techniques included automated exposure control or exposure modulation based on body size. Count of known CT and cardiac nuc med  studies performed in previous 12 months: 1.     COMPARISON:   CT abdomen and pelvis 8/31/2022.    FINDINGS:    Lower chest: Unremarkable.  Liver: Unremarkable.  Gallbladder and bile ducts: Surgically absent gallbladder with mildly dilated bile ducts, likely related to postcholecystectomy state.  Spleen: Unremarkable.  Pancreas: Unremarkable.  Adrenals: Unremarkable.  Kidneys and ureters: Small left-sided renal cysts one of which contains peripheral calcification or milk of calcium, unchanged. No hydronephrosis.  Bowel: Small hiatal hernia. Colonic diverticulosis without associated colonic wall thickening or surrounding stranding.Normal appendix.  Bladder:  Unremarkable.  Reproductive organs: Normal uterus. No adnexal masses. Bilateral Essure devices.  Lymph nodes: Unremarkable.  Peritoneum: Unremarkable.  Vessels: Mild atherosclerosis.  Abdominal wall: Unremarkable.  Bones: Multilevel degenerative changes of the lumbar spine.      Impression:        1.  No acute CT abnormality in the abdomen or pelvis.  2.  Colonic diverticulosis without evidence of acute diverticulitis.      Signer Name: Mk Watts MD   Signed: 2/5/2023 6:06 PM   Workstation Name: [id number]    [facility]         reviewed    ECG/EMG Results (most recent)     None        not reviewed    Assessment & Plan       .  Hematochezia likely due to diverticular disease CT is normal she continues to have intermittent rectal bleeding.  H&H has been relatively stable await GI evaluation     .  Seasonal allergic rhinitis stable nothing acute    .  General anxiety disorder stable continue as needed Xanax    .  DVT prophylaxis SCD      I discussed the patients findings and my recommendations with patient a     Renata Gong MD  02/06/23  08:00 EST      Much of this encounter note is an electronic transcription/translation of spoken language to printed text. The electronic translation of spoken language may permit erroneous, or at times, nonsensical words or phrases to be inadvertently transcribed; Although I have reviewed the note for such errors, some may still exist.    Note Disclaimer: At [facility], we believe that sharing information builds trust and better relationships. You are receiving this note because you recently visited [facility]. It is possible you will see health information before a provider has talked with you about it. This kind of information can be easy to misunderstand. To help you fully understand what it means for your health, we urge you to discuss this note with your provider.

## 2023-02-07 LAB
ANION GAP SERPL CALCULATED.3IONS-SCNC: 7 MMOL/L (ref 5–15)
BUN SERPL-MCNC: 10 MG/DL (ref 8–23)
BUN/CREAT SERPL: 13.5 (ref 7–25)
CALCIUM SPEC-SCNC: 8.2 MG/DL (ref 8.6–10.5)
CHLORIDE SERPL-SCNC: 107 MMOL/L (ref 98–107)
CO2 SERPL-SCNC: 27 MMOL/L (ref 22–29)
CREAT SERPL-MCNC: 0.74 MG/DL (ref 0.57–1)
DEPRECATED RDW RBC AUTO: 45.1 FL (ref 37–54)
DEPRECATED RDW RBC AUTO: 45.8 FL (ref 37–54)
EGFRCR SERPLBLD CKD-EPI 2021: 91.6 ML/MIN/1.73
ERYTHROCYTE [DISTWIDTH] IN BLOOD BY AUTOMATED COUNT: 13 % (ref 12.3–15.4)
ERYTHROCYTE [DISTWIDTH] IN BLOOD BY AUTOMATED COUNT: 13.1 % (ref 12.3–15.4)
GLUCOSE SERPL-MCNC: 99 MG/DL (ref 65–99)
HCT VFR BLD AUTO: 33.4 % (ref 34–46.6)
HCT VFR BLD AUTO: 34.4 % (ref 34–46.6)
HCT VFR BLD AUTO: 35.1 % (ref 34–46.6)
HGB BLD-MCNC: 10.8 G/DL (ref 12–15.9)
HGB BLD-MCNC: 10.9 G/DL (ref 12–15.9)
HGB BLD-MCNC: 11.4 G/DL (ref 12–15.9)
MCH RBC QN AUTO: 30.1 PG (ref 26.6–33)
MCH RBC QN AUTO: 30.6 PG (ref 26.6–33)
MCHC RBC AUTO-ENTMCNC: 31.7 G/DL (ref 31.5–35.7)
MCHC RBC AUTO-ENTMCNC: 32.3 G/DL (ref 31.5–35.7)
MCV RBC AUTO: 94.6 FL (ref 79–97)
MCV RBC AUTO: 95 FL (ref 79–97)
PLATELET # BLD AUTO: 280 10*3/MM3 (ref 140–450)
PLATELET # BLD AUTO: 299 10*3/MM3 (ref 140–450)
PMV BLD AUTO: 8.9 FL (ref 6–12)
PMV BLD AUTO: 9.2 FL (ref 6–12)
POTASSIUM SERPL-SCNC: 3.7 MMOL/L (ref 3.5–5.2)
RBC # BLD AUTO: 3.53 10*6/MM3 (ref 3.77–5.28)
RBC # BLD AUTO: 3.62 10*6/MM3 (ref 3.77–5.28)
SODIUM SERPL-SCNC: 141 MMOL/L (ref 136–145)
WBC NRBC COR # BLD: 6.21 10*3/MM3 (ref 3.4–10.8)
WBC NRBC COR # BLD: 6.82 10*3/MM3 (ref 3.4–10.8)

## 2023-02-07 PROCEDURE — 85027 COMPLETE CBC AUTOMATED: CPT | Performed by: FAMILY MEDICINE

## 2023-02-07 PROCEDURE — 80048 BASIC METABOLIC PNL TOTAL CA: CPT | Performed by: FAMILY MEDICINE

## 2023-02-07 PROCEDURE — 85027 COMPLETE CBC AUTOMATED: CPT | Performed by: INTERNAL MEDICINE

## 2023-02-07 PROCEDURE — 85018 HEMOGLOBIN: CPT | Performed by: FAMILY MEDICINE

## 2023-02-07 PROCEDURE — G0378 HOSPITAL OBSERVATION PER HR: HCPCS

## 2023-02-07 PROCEDURE — 99214 OFFICE O/P EST MOD 30 MIN: CPT | Performed by: INTERNAL MEDICINE

## 2023-02-07 PROCEDURE — 85014 HEMATOCRIT: CPT | Performed by: FAMILY MEDICINE

## 2023-02-07 RX ORDER — POLYETHYLENE GLYCOL 3350 17 G/17G
51 POWDER, FOR SOLUTION ORAL ONCE
Status: COMPLETED | OUTPATIENT
Start: 2023-02-08 | End: 2023-02-08

## 2023-02-07 RX ORDER — POLYETHYLENE GLYCOL 3350 17 G/17G
119 POWDER, FOR SOLUTION ORAL EVERY 6 HOURS
Status: COMPLETED | OUTPATIENT
Start: 2023-02-08 | End: 2023-02-08

## 2023-02-07 RX ADMIN — ALPRAZOLAM 0.25 MG: 0.25 TABLET ORAL at 20:42

## 2023-02-07 RX ADMIN — CETIRIZINE HYDROCHLORIDE 10 MG: 10 TABLET, FILM COATED ORAL at 08:36

## 2023-02-07 NOTE — PROGRESS NOTES
"Daily Progress Note:      Chief complaint: Rectal bleeding diverticulosis    Subjective: She had several bowel meds throughout the day it was bloody.  Late last evening her bleeding stopped and she had a normal brown bowel movement.  No abdominal pain nausea or vomiting this morning she had another bloody bowel movement about 250 cc      LOS: 0 days     Vital Signs  Temp:  [97 °F (36.1 °C)-97.5 °F (36.4 °C)] 97.5 °F (36.4 °C)  Heart Rate:  [66-73] 73  Resp:  [14-16] 14  BP: (108-125)/(74-79) 125/78  Oxygen Therapy  SpO2: 97 %  Pulse Oximetry Type: Intermittent  Device (Oxygen Therapy): room air}  Body mass index is 33.57 kg/m².  Flowsheet Rows    Flowsheet Row First Filed Value   Admission Height 167.6 cm (66\") Documented at 02/05/2023 1244   Admission Weight 93.9 kg (207 lb) Documented at 02/05/2023 1244                   Documented weights    02/05/23 1244 02/05/23 1753 02/05/23 1800 02/06/23 0609   Weight: 93.9 kg (207 lb) 93.6 kg (206 lb 5.6 oz) 94.5 kg (208 lb 6.4 oz) 94.3 kg (207 lb 14.4 oz)           Patient Vitals for the past 24 hrs:   BP Temp Temp src Pulse Resp SpO2   02/07/23 0551 125/78 97.5 °F (36.4 °C) Oral 73 14 97 %   02/06/23 2345 122/78 97.2 °F (36.2 °C) Oral 66 16 98 %   02/06/23 2006 108/74 97 °F (36.1 °C) Oral 69 16 97 %   02/06/23 1500 121/79 97.1 °F (36.2 °C) Oral 72 16 96 %       94.3 kg (207 lb 14.4 oz)    Intake/Output                       02/05/23 0701 - 02/06/23 0700 02/06/23 0701 - 02/07/23 0700     [id number] 9573-9507 Total 8604-8296 2138-2457 Total                 Intake    P.O.  --  240 240  120  -- 120    I.V.  --  972.9 972.9  953.1  -- 953.1    Total Intake -- 1212.9 1212.9 1073.1 -- 1073.1       Output    Urine  --  1800 1800  100  -- 100    Stool  --  -- --  --  200 200    Total Output -- 1800 1800 100 200 300           Intake/Output Summary (Last 24 hours) at 2/7/2023 0753  Last data filed at 2/7/2023 0640  Gross per 24 hour   Intake 1073.08 ml   Output 300 ml   Net 773.08 ml "        Intake/Output Summary (Last 24 hours) at 2/7/2023 0753  Last data filed at 2/7/2023 0640  Gross per 24 hour   Intake 1073.08 ml   Output 300 ml   Net 773.08 ml        Review of Systems   Constitutional: Negative for activity change, appetite change and fatigue.   HENT: Negative for congestion.    Respiratory: Negative for cough, chest tightness, shortness of breath and wheezing.    Cardiovascular: Negative for chest pain.   Gastrointestinal: Positive for blood in stool. Negative for abdominal distention, abdominal pain, diarrhea, nausea and vomiting.   Endocrine: Negative for polyphagia and polyuria.   Genitourinary: Negative for frequency.   Skin: Negative for rash.   Neurological: Negative for light-headedness.   Hematological: Does not bruise/bleed easily.   Psychiatric/Behavioral: Negative for agitation and behavioral problems.       Physical Exam  Vitals and nursing note reviewed.   Constitutional:       Appearance: She is well-developed. She is obese.   HENT:      Head: Normocephalic.   Eyes:      Conjunctiva/sclera: Conjunctivae normal.   Neck:      Thyroid: No thyromegaly.      Vascular: No JVD.   Cardiovascular:      Rate and Rhythm: Normal rate and regular rhythm.      Heart sounds: Normal heart sounds. No murmur heard.  Pulmonary:      Effort: Pulmonary effort is normal. No respiratory distress.      Breath sounds: Normal breath sounds. No wheezing or rales.   Abdominal:      General: Bowel sounds are normal. There is no distension.      Palpations: Abdomen is soft.      Tenderness: There is no abdominal tenderness. There is no guarding.   Skin:     General: Skin is warm and dry.      Findings: No rash.   Neurological:      Mental Status: She is alert.         Medication Review:   I have reviewed the patient's current medication list  Scheduled Meds:cetirizine, 10 mg, Oral, Daily  sodium chloride, 10 mL, Intravenous, Q12H      Continuous Infusions:   PRN Meds:.•  acetaminophen **OR** acetaminophen  **OR** acetaminophen  •  ALPRAZolam  •  aluminum-magnesium hydroxide-simethicone  •  melatonin  •  ondansetron **OR** ondansetron  •  sodium chloride  •  [COMPLETED] Insert Peripheral IV **AND** sodium chloride  •  sodium chloride      Result Review        I have personally reviewed the results from the time of this admission to 2/7/2023 07:53 EST and agree with these findings:  [x]  Laboratory  []  Microbiology  [x]  Radiology  []  EKG/Telemetry   []  Cardiology/Vascular   []  Pathology  []  Old records  []  Other:          Labs:  Results from last 7 days   Lab Units 02/07/23  0351 02/06/23  1230 02/06/23  0340 02/05/23 2053 02/05/23  1714 02/05/23  1519   WBC 10*3/mm3 6.21 6.89 7.53  --   --  8.72   RBC 10*6/mm3 3.53* 3.66* 3.65*  --   --  4.18   HEMOGLOBIN g/dL 10.8* 11.2* 11.1* 11.5* 12.6 12.7   HEMATOCRIT % 33.4* 34.7 34.7 36.2 38.4 39.0   RDW % 13.1 13.2 13.0  --   --  13.0   MCV fL 94.6 94.8 95.1  --   --  93.3   MCH pg 30.6 30.6 30.4  --   --  30.4   MCHC g/dL 32.3 32.3 32.0  --   --  32.6   MPV fL 9.2 9.0 9.4  --   --  9.4   PLATELETS 10*3/mm3 280 295 304  --   --  327   RDW-SD fl 45.1 45.8 46.3  --   --  44.5       Results from last 7 days   Lab Units 02/07/23  0351 02/06/23  0340 02/05/23  1519   SODIUM mmol/L 141 143 142   POTASSIUM mmol/L 3.7 3.8 4.0   CHLORIDE mmol/L 107 108* 105   CO2 mmol/L 27.0 25.0 25.8   BUN mg/dL 10 15 17   CREATININE mg/dL 0.74 0.73 0.75   CALCIUM mg/dL 8.2* 8.5* 9.1   BILIRUBIN mg/dL  --   --  0.4   ALK PHOS U/L  --   --  70   ALT (SGPT) U/L  --   --  11   AST (SGOT) U/L  --   --  17   GLUCOSE mg/dL 99 97 108*           Results from last 7 days   Lab Units 02/07/23  0351 02/06/23  1230 02/06/23  0340 02/05/23  1519   AST (SGOT) U/L  --   --   --  17   ALT (SGPT) U/L  --   --   --  11   PLATELETS 10*3/mm3 280 295 304 327         Lab Results (last 24 hours)     Procedure Component Value Units Date/Time    Basic Metabolic Panel [[id number]]  (Abnormal) Collected: 02/07/23 0351     Specimen: Blood Updated: 02/07/23 0427     Glucose 99 mg/dL      BUN 10 mg/dL      Creatinine 0.74 mg/dL      Sodium 141 mmol/L      Potassium 3.7 mmol/L      Chloride 107 mmol/L      CO2 27.0 mmol/L      Calcium 8.2 mg/dL      BUN/Creatinine Ratio 13.5     Anion Gap 7.0 mmol/L      eGFR 91.6 mL/min/1.73     Narrative:      GFR Normal >60  Chronic Kidney Disease <60  Kidney Failure <15      CBC (No Diff) [[id number]]  (Abnormal) Collected: 02/07/23 0351    Specimen: Blood Updated: 02/07/23 0408     WBC 6.21 10*3/mm3      RBC 3.53 10*6/mm3      Hemoglobin 10.8 g/dL      Hematocrit 33.4 %      MCV 94.6 fL      MCH 30.6 pg      MCHC 32.3 g/dL      RDW 13.1 %      RDW-SD 45.1 fl      MPV 9.2 fL      Platelets 280 10*3/mm3     CBC (No Diff) [[id number]]  (Abnormal) Collected: 02/06/23 1230    Specimen: Blood Updated: 02/06/23 1241     WBC 6.89 10*3/mm3      RBC 3.66 10*6/mm3      Hemoglobin 11.2 g/dL      Hematocrit 34.7 %      MCV 94.8 fL      MCH 30.6 pg      MCHC 32.3 g/dL      RDW 13.2 %      RDW-SD 45.8 fl      MPV 9.0 fL      Platelets 295 10*3/mm3                     Results from last 7 days   Lab Units 02/05/23  1519   INR  0.96   APTT seconds 25.9                     No results found for: POCGLU                      Radiology:  Imaging Results (Last 24 Hours)     ** No results found for the last 24 hours. **          Cardiology:  ECG/EMG Results (last 24 hours)     ** No results found for the last 24 hours. **          I have reviewed recent labs results and consult notes.    Please note portions of this assessment/plan may have been copied and pasted, but I have personally seen this patient and reviewed each line of this assessment and plan for accuracy and made updates to reflect my necessary changes    Assessment and Plan:    .  Rectal bleeding likely diverticular.  Further management per GI.  Hemoglobin down to 10.8 this morning we will recheck at noon    .  Anxiety disorder stable continue as needed  Xanax        Much of this encounter note is an electronic transcription/translation of spoken language to printed text. The electronic translation of spoken language may permit erroneous, or at times, nonsensical words or phrases to be inadvertently transcribed; Although I have reviewed the note for such errors, some may still exist.    Note Disclaimer: At [facility], we believe that sharing information builds trust and better relationships. You are receiving this note because you recently visited [facility]. It is possible you will see health information before a provider has talked with you about it. This kind of information can be easy to misunderstand. To help you fully understand what it means for your health, we urge you to discuss this note with your provider.

## 2023-02-07 NOTE — PLAN OF CARE
Problem: Adult Inpatient Plan of Care  Goal: Plan of Care Review  Outcome: Ongoing, Not Progressing  Flowsheets (Taken 2/7/2023 1809)  Progress: no change  Plan of Care Reviewed With: patient  Outcome Evaluation: pt states that she has had several bloody stools today- this nurse only witnessed one bloody BM. Advanced to FL thid evening. CBC in Am. HandH was unchanged today.   Goal Outcome Evaluation:  Plan of Care Reviewed With: patient        Progress: no change  Outcome Evaluation: pt states that she has had several bloody stools today- this nurse only witnessed one bloody BM. Advanced to FL thid evening. CBC in Am. HandH was unchanged today.

## 2023-02-07 NOTE — PLAN OF CARE
"Goal Outcome Evaluation:              Outcome Evaluation: Pt had one bowl movement at beginning of shift and was light brown. At end of shift she had a bowl movement that was dark red/black with chunks. Pt has no c/o of pain. Stated she has , \"Nerves\" about what is going on with her. She continues with clear liquids.  "

## 2023-02-08 ENCOUNTER — ANESTHESIA EVENT (OUTPATIENT)
Dept: PERIOP | Facility: HOSPITAL | Age: 63
End: 2023-02-08
Payer: COMMERCIAL

## 2023-02-08 LAB
BASOPHILS # BLD AUTO: 0.05 10*3/MM3 (ref 0–0.2)
BASOPHILS NFR BLD AUTO: 0.8 % (ref 0–1.5)
DEPRECATED RDW RBC AUTO: 44.7 FL (ref 37–54)
EOSINOPHIL # BLD AUTO: 0.31 10*3/MM3 (ref 0–0.4)
EOSINOPHIL NFR BLD AUTO: 4.9 % (ref 0.3–6.2)
ERYTHROCYTE [DISTWIDTH] IN BLOOD BY AUTOMATED COUNT: 13 % (ref 12.3–15.4)
HCT VFR BLD AUTO: 32.8 % (ref 34–46.6)
HGB BLD-MCNC: 10.6 G/DL (ref 12–15.9)
IMM GRANULOCYTES # BLD AUTO: 0.04 10*3/MM3 (ref 0–0.05)
IMM GRANULOCYTES NFR BLD AUTO: 0.6 % (ref 0–0.5)
LYMPHOCYTES # BLD AUTO: 1.95 10*3/MM3 (ref 0.7–3.1)
LYMPHOCYTES NFR BLD AUTO: 30.6 % (ref 19.6–45.3)
MCH RBC QN AUTO: 30.4 PG (ref 26.6–33)
MCHC RBC AUTO-ENTMCNC: 32.3 G/DL (ref 31.5–35.7)
MCV RBC AUTO: 94 FL (ref 79–97)
MONOCYTES # BLD AUTO: 0.73 10*3/MM3 (ref 0.1–0.9)
MONOCYTES NFR BLD AUTO: 11.5 % (ref 5–12)
NEUTROPHILS NFR BLD AUTO: 3.29 10*3/MM3 (ref 1.7–7)
NEUTROPHILS NFR BLD AUTO: 51.6 % (ref 42.7–76)
NRBC BLD AUTO-RTO: 0 /100 WBC (ref 0–0.2)
PLATELET # BLD AUTO: 290 10*3/MM3 (ref 140–450)
PMV BLD AUTO: 9.2 FL (ref 6–12)
RBC # BLD AUTO: 3.49 10*6/MM3 (ref 3.77–5.28)
WBC NRBC COR # BLD: 6.37 10*3/MM3 (ref 3.4–10.8)

## 2023-02-08 PROCEDURE — 85025 COMPLETE CBC W/AUTO DIFF WBC: CPT | Performed by: INTERNAL MEDICINE

## 2023-02-08 PROCEDURE — G0378 HOSPITAL OBSERVATION PER HR: HCPCS

## 2023-02-08 PROCEDURE — 99214 OFFICE O/P EST MOD 30 MIN: CPT | Performed by: INTERNAL MEDICINE

## 2023-02-08 RX ORDER — SODIUM CHLORIDE, SODIUM LACTATE, POTASSIUM CHLORIDE, CALCIUM CHLORIDE 600; 310; 30; 20 MG/100ML; MG/100ML; MG/100ML; MG/100ML
75 INJECTION, SOLUTION INTRAVENOUS CONTINUOUS
Status: DISCONTINUED | OUTPATIENT
Start: 2023-02-08 | End: 2023-02-09

## 2023-02-08 RX ADMIN — POLYETHYLENE GLYCOL 3350 119 G: 17 POWDER, FOR SOLUTION ORAL at 17:48

## 2023-02-08 RX ADMIN — CETIRIZINE HYDROCHLORIDE 10 MG: 10 TABLET, FILM COATED ORAL at 09:51

## 2023-02-08 RX ADMIN — SODIUM CHLORIDE, POTASSIUM CHLORIDE, SODIUM LACTATE AND CALCIUM CHLORIDE 75 ML/HR: 600; 310; 30; 20 INJECTION, SOLUTION INTRAVENOUS at 10:47

## 2023-02-08 RX ADMIN — POLYETHYLENE GLYCOL 3350 51 G: 17 POWDER, FOR SOLUTION ORAL at 09:51

## 2023-02-08 RX ADMIN — Medication 10 ML: at 20:14

## 2023-02-08 RX ADMIN — POLYETHYLENE GLYCOL 3350 119 G: 17 POWDER, FOR SOLUTION ORAL at 12:51

## 2023-02-08 RX ADMIN — Medication 10 ML: at 10:47

## 2023-02-08 RX ADMIN — ALPRAZOLAM 0.25 MG: 0.25 TABLET ORAL at 20:14

## 2023-02-08 NOTE — PROGRESS NOTES
GI Daily Progress Note    Assessment/Plan:      Acute lower GI bleeding       LOS: 0 days     Pamela Mack is a 62 y.o. female who was admitted with Acute lower GI bleeding. She reports the symptoms are improving with treatment. Still small volume bloody stools and is tolerating her Bowel Prep but still seeing red. HGB stable.     Subjective:    Patient expresses diarrhea and bloody stools  Patient denies abdominal pain and vomiting    Objective:    Vital signs in last 24 hours:  Temp:  [97.7 °F (36.5 °C)-98.7 °F (37.1 °C)] 98 °F (36.7 °C)  Heart Rate:  [80-86] 80  Resp:  [16-18] 16  BP: (114-131)/(67-72) 114/67    Intake/Output last 3 shifts:  I/O last 3 completed shifts:  In: 1520 [P.O.:1520]  Out: 400 [Urine:200; Stool:200]  Intake/Output this shift:  I/O this shift:  In: 2680 [P.O.:2680]  Out: -     Physical Exam:Abdomen  Sounds Normal Active Bowel Sounds   Distension Soft   Tenderness Nontender     Imaging Results (Last 72 Hours)     Procedure Component Value Units Date/Time    CT Abdomen Pelvis With Contrast [[id number]] Collected: 02/05/23 1806     Updated: 02/05/23 1808    Narrative:      CT Abdomen Pelvis W    INDICATION:   Lower GI bleed with diverticulosis;Gastrointestinal hemorrhage, unspecified    TECHNIQUE:   CT of the abdomen and pelvis with IV contrast. Coronal and sagittal reconstructions were obtained.  Radiation dose reduction techniques included automated exposure control or exposure modulation based on body size. Count of known CT and cardiac nuc med  studies performed in previous 12 months: 1.     COMPARISON:   CT abdomen and pelvis 8/31/2022.    FINDINGS:    Lower chest: Unremarkable.  Liver: Unremarkable.  Gallbladder and bile ducts: Surgically absent gallbladder with mildly dilated bile ducts, likely related to postcholecystectomy state.  Spleen: Unremarkable.  Pancreas: Unremarkable.  Adrenals: Unremarkable.  Kidneys and ureters: Small left-sided renal cysts one of which contains peripheral  calcification or milk of calcium, unchanged. No hydronephrosis.  Bowel: Small hiatal hernia. Colonic diverticulosis without associated colonic wall thickening or surrounding stranding.Normal appendix.  Bladder: Unremarkable.  Reproductive organs: Normal uterus. No adnexal masses. Bilateral Essure devices.  Lymph nodes: Unremarkable.  Peritoneum: Unremarkable.  Vessels: Mild atherosclerosis.  Abdominal wall: Unremarkable.  Bones: Multilevel degenerative changes of the lumbar spine.      Impression:        1.  No acute CT abnormality in the abdomen or pelvis.  2.  Colonic diverticulosis without evidence of acute diverticulitis.      Signer Name: Mk Watts MD   Signed: 2/5/2023 6:06 PM   Workstation Name: [id number]    [facility]          WBC   Date Value Ref Range Status   02/08/2023 6.37 3.40 - 10.80 10*3/mm3 Final     RBC   Date Value Ref Range Status   02/08/2023 3.49 (L) 3.77 - 5.28 10*6/mm3 Final     Hemoglobin   Date Value Ref Range Status   02/08/2023 10.6 (L) 12.0 - 15.9 g/dL Final     Hematocrit   Date Value Ref Range Status   02/08/2023 32.8 (L) 34.0 - 46.6 % Final     MCV   Date Value Ref Range Status   02/08/2023 94.0 79.0 - 97.0 fL Final     MCH   Date Value Ref Range Status   02/08/2023 30.4 26.6 - 33.0 pg Final     MCHC   Date Value Ref Range Status   02/08/2023 32.3 31.5 - 35.7 g/dL Final     RDW   Date Value Ref Range Status   02/08/2023 13.0 12.3 - 15.4 % Final     RDW-SD   Date Value Ref Range Status   02/08/2023 44.7 37.0 - 54.0 fl Final     MPV   Date Value Ref Range Status   02/08/2023 9.2 6.0 - 12.0 fL Final     Platelets   Date Value Ref Range Status   02/08/2023 290 140 - 450 10*3/mm3 Final     Neutrophil %   Date Value Ref Range Status   02/08/2023 51.6 42.7 - 76.0 % Final     Lymphocyte %   Date Value Ref Range Status   02/08/2023 30.6 19.6 - 45.3 % Final     Monocyte %   Date Value Ref Range Status   02/08/2023 11.5 5.0 - 12.0 % Final     Eosinophil %   Date Value  Ref Range Status   02/08/2023 4.9 0.3 - 6.2 % Final     Basophil %   Date Value Ref Range Status   02/08/2023 0.8 0.0 - 1.5 % Final     Immature Grans %   Date Value Ref Range Status   02/08/2023 0.6 (H) 0.0 - 0.5 % Final     Neutrophils, Absolute   Date Value Ref Range Status   02/08/2023 3.29 1.70 - 7.00 10*3/mm3 Final     Lymphocytes, Absolute   Date Value Ref Range Status   02/08/2023 1.95 0.70 - 3.10 10*3/mm3 Final     Monocytes, Absolute   Date Value Ref Range Status   02/08/2023 0.73 0.10 - 0.90 10*3/mm3 Final     Eosinophils, Absolute   Date Value Ref Range Status   02/08/2023 0.31 0.00 - 0.40 10*3/mm3 Final     Basophils, Absolute   Date Value Ref Range Status   02/08/2023 0.05 0.00 - 0.20 10*3/mm3 Final     Immature Grans, Absolute   Date Value Ref Range Status   02/08/2023 0.04 0.00 - 0.05 10*3/mm3 Final     nRBC   Date Value Ref Range Status   02/08/2023 0.0 0.0 - 0.2 /100 WBC Final       Glucose   Date Value Ref Range Status   02/07/2023 99 65 - 99 mg/dL Final     Sodium   Date Value Ref Range Status   02/07/2023 141 136 - 145 mmol/L Final     Potassium   Date Value Ref Range Status   02/07/2023 3.7 3.5 - 5.2 mmol/L Final     CO2   Date Value Ref Range Status   02/07/2023 27.0 22.0 - 29.0 mmol/L Final     Chloride   Date Value Ref Range Status   02/07/2023 107 98 - 107 mmol/L Final     Anion Gap   Date Value Ref Range Status   02/07/2023 7.0 5.0 - 15.0 mmol/L Final     Creatinine   Date Value Ref Range Status   02/07/2023 0.74 0.57 - 1.00 mg/dL Final     BUN   Date Value Ref Range Status   02/07/2023 10 8 - 23 mg/dL Final     BUN/Creatinine Ratio   Date Value Ref Range Status   02/07/2023 13.5 7.0 - 25.0 Final     Calcium   Date Value Ref Range Status   02/07/2023 8.2 (L) 8.6 - 10.5 mg/dL Final     Alkaline Phosphatase   Date Value Ref Range Status   02/05/2023 70 39 - 117 U/L Final     Total Protein   Date Value Ref Range Status   02/05/2023 7.2 6.0 - 8.5 g/dL Final     ALT (SGPT)   Date Value Ref  Range Status   02/05/2023 11 1 - 33 U/L Final     AST (SGOT)   Date Value Ref Range Status   02/05/2023 17 1 - 32 U/L Final     Total Bilirubin   Date Value Ref Range Status   02/05/2023 0.4 0.0 - 1.2 mg/dL Final     Albumin   Date Value Ref Range Status   02/05/2023 4.3 3.5 - 5.2 g/dL Final     Globulin   Date Value Ref Range Status   02/05/2023 2.9 gm/dL Final     Rectal Bleding   Diverticulosis  PHX of Colon Polyps  ABLA    Proceeding to In PT Colonoscopy tomorrow.

## 2023-02-08 NOTE — PROGRESS NOTES
GI Daily Progress Note    Assessment/Plan:      Acute lower GI bleeding       LOS: 0 days     Pamela Mack is a 62 y.o. female who was admitted with Acute lower GI bleeding. She reports the symptoms are unchanged. Has had 3 Bloody BMs today smaller volume and her HGB hasnt dropped but she is appropriately concerned and option of sump;ly pursuing an IN PT colonoscopy were reviewed and she feels this is the best course.     Subjective:    Patient expresses bloody stools  Patient denies abdominal pain and vomiting    Objective:    Vital signs in last 24 hours:  Temp:  [97.2 °F (36.2 °C)-98.7 °F (37.1 °C)] 98.7 °F (37.1 °C)  Heart Rate:  [66-86] 86  Resp:  [14-18] 18  BP: (122-125)/(72-78) 123/72    Intake/Output last 3 shifts:  I/O last 3 completed shifts:  In: 2593.1 [P.O.:1640; I.V.:953.1]  Out: 500 [Urine:300; Stool:200]  Intake/Output this shift:  No intake/output data recorded.    Physical Exam:Abdomen  Sounds Normal Active Bowel Sounds   Distension Soft   Tenderness Nontender     Imaging Results (Last 72 Hours)     Procedure Component Value Units Date/Time    CT Abdomen Pelvis With Contrast [[id number]] Collected: 02/05/23 1806     Updated: 02/05/23 1808    Narrative:      CT Abdomen Pelvis W    INDICATION:   Lower GI bleed with diverticulosis;Gastrointestinal hemorrhage, unspecified    TECHNIQUE:   CT of the abdomen and pelvis with IV contrast. Coronal and sagittal reconstructions were obtained.  Radiation dose reduction techniques included automated exposure control or exposure modulation based on body size. Count of known CT and cardiac nuc med  studies performed in previous 12 months: 1.     COMPARISON:   CT abdomen and pelvis 8/31/2022.    FINDINGS:    Lower chest: Unremarkable.  Liver: Unremarkable.  Gallbladder and bile ducts: Surgically absent gallbladder with mildly dilated bile ducts, likely related to postcholecystectomy state.  Spleen: Unremarkable.  Pancreas: Unremarkable.  Adrenals:  Unremarkable.  Kidneys and ureters: Small left-sided renal cysts one of which contains peripheral calcification or milk of calcium, unchanged. No hydronephrosis.  Bowel: Small hiatal hernia. Colonic diverticulosis without associated colonic wall thickening or surrounding stranding.Normal appendix.  Bladder: Unremarkable.  Reproductive organs: Normal uterus. No adnexal masses. Bilateral Essure devices.  Lymph nodes: Unremarkable.  Peritoneum: Unremarkable.  Vessels: Mild atherosclerosis.  Abdominal wall: Unremarkable.  Bones: Multilevel degenerative changes of the lumbar spine.      Impression:        1.  No acute CT abnormality in the abdomen or pelvis.  2.  Colonic diverticulosis without evidence of acute diverticulitis.      Signer Name: Mk Watts MD   Signed: 2/5/2023 6:06 PM   Workstation Name: [id number]    [facility]          WBC   Date Value Ref Range Status   02/07/2023 6.82 3.40 - 10.80 10*3/mm3 Final     RBC   Date Value Ref Range Status   02/07/2023 3.62 (L) 3.77 - 5.28 10*6/mm3 Final     Hemoglobin   Date Value Ref Range Status   02/07/2023 11.4 (L) 12.0 - 15.9 g/dL Final     Hematocrit   Date Value Ref Range Status   02/07/2023 35.1 34.0 - 46.6 % Final     MCV   Date Value Ref Range Status   02/07/2023 95.0 79.0 - 97.0 fL Final     MCH   Date Value Ref Range Status   02/07/2023 30.1 26.6 - 33.0 pg Final     MCHC   Date Value Ref Range Status   02/07/2023 31.7 31.5 - 35.7 g/dL Final     RDW   Date Value Ref Range Status   02/07/2023 13.0 12.3 - 15.4 % Final     RDW-SD   Date Value Ref Range Status   02/07/2023 45.8 37.0 - 54.0 fl Final     MPV   Date Value Ref Range Status   02/07/2023 8.9 6.0 - 12.0 fL Final     Platelets   Date Value Ref Range Status   02/07/2023 299 140 - 450 10*3/mm3 Final     Neutrophil %   Date Value Ref Range Status   02/05/2023 70.4 42.7 - 76.0 % Final     Lymphocyte %   Date Value Ref Range Status   02/05/2023 20.1 19.6 - 45.3 % Final     Monocyte %    Date Value Ref Range Status   02/05/2023 6.4 5.0 - 12.0 % Final     Eosinophil %   Date Value Ref Range Status   02/05/2023 1.8 0.3 - 6.2 % Final     Basophil %   Date Value Ref Range Status   02/05/2023 0.6 0.0 - 1.5 % Final     Immature Grans %   Date Value Ref Range Status   02/05/2023 0.7 (H) 0.0 - 0.5 % Final     Neutrophils, Absolute   Date Value Ref Range Status   02/05/2023 6.14 1.70 - 7.00 10*3/mm3 Final     Lymphocytes, Absolute   Date Value Ref Range Status   02/05/2023 1.75 0.70 - 3.10 10*3/mm3 Final     Monocytes, Absolute   Date Value Ref Range Status   02/05/2023 0.56 0.10 - 0.90 10*3/mm3 Final     Eosinophils, Absolute   Date Value Ref Range Status   02/05/2023 0.16 0.00 - 0.40 10*3/mm3 Final     Basophils, Absolute   Date Value Ref Range Status   02/05/2023 0.05 0.00 - 0.20 10*3/mm3 Final     Immature Grans, Absolute   Date Value Ref Range Status   02/05/2023 0.06 (H) 0.00 - 0.05 10*3/mm3 Final     nRBC   Date Value Ref Range Status   02/05/2023 0.0 0.0 - 0.2 /100 WBC Final       Glucose   Date Value Ref Range Status   02/07/2023 99 65 - 99 mg/dL Final     Sodium   Date Value Ref Range Status   02/07/2023 141 136 - 145 mmol/L Final     Potassium   Date Value Ref Range Status   02/07/2023 3.7 3.5 - 5.2 mmol/L Final     CO2   Date Value Ref Range Status   02/07/2023 27.0 22.0 - 29.0 mmol/L Final     Chloride   Date Value Ref Range Status   02/07/2023 107 98 - 107 mmol/L Final     Anion Gap   Date Value Ref Range Status   02/07/2023 7.0 5.0 - 15.0 mmol/L Final     Creatinine   Date Value Ref Range Status   02/07/2023 0.74 0.57 - 1.00 mg/dL Final     BUN   Date Value Ref Range Status   02/07/2023 10 8 - 23 mg/dL Final     BUN/Creatinine Ratio   Date Value Ref Range Status   02/07/2023 13.5 7.0 - 25.0 Final     Calcium   Date Value Ref Range Status   02/07/2023 8.2 (L) 8.6 - 10.5 mg/dL Final     Alkaline Phosphatase   Date Value Ref Range Status   02/05/2023 70 39 - 117 U/L Final     Total Protein    Date Value Ref Range Status   02/05/2023 7.2 6.0 - 8.5 g/dL Final     ALT (SGPT)   Date Value Ref Range Status   02/05/2023 11 1 - 33 U/L Final     AST (SGOT)   Date Value Ref Range Status   02/05/2023 17 1 - 32 U/L Final     Total Bilirubin   Date Value Ref Range Status   02/05/2023 0.4 0.0 - 1.2 mg/dL Final     Albumin   Date Value Ref Range Status   02/05/2023 4.3 3.5 - 5.2 g/dL Final     Globulin   Date Value Ref Range Status   02/05/2023 2.9 gm/dL Final     Rectal Bleding   Diverticulosis  PHX of Colon Polyps  ABLA    Sammy pursue a bowel prep tomorrow and a colonoscopy on Thurs.- Orders are in.

## 2023-02-08 NOTE — PLAN OF CARE
"Goal Outcome Evaluation:              Outcome Evaluation: Pt continues to be on room air.  [facility] scheduling her for scope on Thursday.  Will be prepping for today. No c/o pain or discomfort just, \" Nerves.\" Zanax was given for night time medication to help sleep.  "

## 2023-02-08 NOTE — PROGRESS NOTES
"Daily Progress Note:      Chief complaint: Rectal bleeding diverticulosis    Subjective: Patient continued to have several bloody bowel movements small-volume yesterday none since last night no nausea vomiting no fatigue or weakness     LOS: 0 days     Vital Signs  Temp:  [97.7 °F (36.5 °C)-98.7 °F (37.1 °C)] 98 °F (36.7 °C)  Heart Rate:  [80-86] 80  Resp:  [16-18] 16  BP: (114-131)/(67-72) 114/67  Oxygen Therapy  SpO2: 99 %  Pulse Oximetry Type: Intermittent  Device (Oxygen Therapy): room air}  Body mass index is 33.72 kg/m².  Flowsheet Rows    Flowsheet Row First Filed Value   Admission Height 167.6 cm (66\") Documented at 02/05/2023 1244   Admission Weight 93.9 kg (207 lb) Documented at 02/05/2023 1244                   Documented weights    02/05/23 1244 02/05/23 1753 02/05/23 1800 02/06/23 0609   Weight: 93.9 kg (207 lb) 93.6 kg (206 lb 5.6 oz) 94.5 kg (208 lb 6.4 oz) 94.3 kg (207 lb 14.4 oz)    02/08/23 0700   Weight: 94.7 kg (208 lb 12.8 oz)           Patient Vitals for the past 24 hrs:   BP Temp Temp src Pulse Resp SpO2 Weight   02/08/23 0700 -- -- -- -- -- -- 94.7 kg (208 lb 12.8 oz)   02/08/23 0607 114/67 98 °F (36.7 °C) Oral 80 16 99 % --   02/07/23 2016 131/72 97.7 °F (36.5 °C) Oral 82 18 98 % --   02/07/23 1547 123/72 98.7 °F (37.1 °C) Oral 86 18 99 % --       94.7 kg (208 lb 12.8 oz)    Intake/Output                       02/06/23 0701 - 02/07/23 0700 02/07/23 0701 - 02/08/23 0700     7600-2225 1270-6854 Total 0702-9346 0368-4948 Total                 Intake    P.O.  120  -- 120  1520  -- 1520    I.V.  953.1  -- 953.1  --  -- --    Total Intake 1073.1 -- 1073.1 1520 -- 1520       Output    Urine  100  -- 100  200  -- 200    Stool  --  200 200  --  -- --    Total Output 100 200 300 200 -- 200           Intake/Output Summary (Last 24 hours) at 2/8/2023 0725  Last data filed at 2/7/2023 1739  Gross per 24 hour   Intake 1520 ml   Output 200 ml   Net 1320 ml        Intake/Output Summary (Last 24 hours) at " 2/8/2023 0725  Last data filed at 2/7/2023 1739  Gross per 24 hour   Intake 1520 ml   Output 200 ml   Net 1320 ml        Review of Systems   Constitutional: Negative for activity change, appetite change and fatigue.   HENT: Negative for congestion.    Respiratory: Negative for cough, chest tightness, shortness of breath and wheezing.    Cardiovascular: Negative for chest pain.   Gastrointestinal: Positive for blood in stool. Negative for abdominal distention, abdominal pain, diarrhea, nausea and vomiting.   Endocrine: Negative for polyphagia and polyuria.   Genitourinary: Negative for frequency.   Skin: Negative for rash.   Neurological: Negative for light-headedness.   Hematological: Does not bruise/bleed easily.   Psychiatric/Behavioral: Negative for agitation and behavioral problems.       Physical Exam  Vitals and nursing note reviewed.   Constitutional:       Appearance: She is well-developed. She is obese.   HENT:      Head: Normocephalic.   Eyes:      Conjunctiva/sclera: Conjunctivae normal.   Neck:      Thyroid: No thyromegaly.      Vascular: No JVD.   Cardiovascular:      Rate and Rhythm: Normal rate and regular rhythm.      Heart sounds: Normal heart sounds. No murmur heard.  Pulmonary:      Effort: Pulmonary effort is normal. No respiratory distress.      Breath sounds: Normal breath sounds. No wheezing or rales.   Abdominal:      General: Bowel sounds are normal. There is no distension.      Palpations: Abdomen is soft.      Tenderness: There is no abdominal tenderness. There is no guarding.   Skin:     General: Skin is warm and dry.      Findings: No rash.   Neurological:      Mental Status: She is alert.         Medication Review:   I have reviewed the patient's current medication list  Scheduled Meds:cetirizine, 10 mg, Oral, Daily  polyethylene glycol, 119 g, Oral, Q6H  polyethylene glycol, 51 g, Oral, Once  sodium chloride, 10 mL, Intravenous, Q12H      Continuous Infusions:   PRN Meds:.•   acetaminophen **OR** acetaminophen **OR** acetaminophen  •  ALPRAZolam  •  aluminum-magnesium hydroxide-simethicone  •  melatonin  •  ondansetron **OR** ondansetron  •  sodium chloride  •  [COMPLETED] Insert Peripheral IV **AND** sodium chloride  •  sodium chloride      Result Review        I have personally reviewed the results from the time of this admission to 2/8/2023 07:25 EST and agree with these findings:  [x]  Laboratory  []  Microbiology  [x]  Radiology  []  EKG/Telemetry   []  Cardiology/Vascular   []  Pathology  []  Old records  []  Other:          Labs:  Results from last 7 days   Lab Units 02/08/23  0419 02/07/23  1845 02/07/23  1209 02/07/23  0351 02/06/23  1230 02/06/23  0340 02/05/23  2053 02/05/23  1714 02/05/23  1519   WBC 10*3/mm3 6.37  --  6.82 6.21 6.89 7.53  --   --  8.72   RBC 10*6/mm3 3.49*  --  3.62* 3.53* 3.66* 3.65*  --   --  4.18   HEMOGLOBIN g/dL 10.6* 11.4* 10.9* 10.8* 11.2* 11.1* 11.5*   < > 12.7   HEMATOCRIT % 32.8* 35.1 34.4 33.4* 34.7 34.7 36.2   < > 39.0   RDW % 13.0  --  13.0 13.1 13.2 13.0  --   --  13.0   MCV fL 94.0  --  95.0 94.6 94.8 95.1  --   --  93.3   MCH pg 30.4  --  30.1 30.6 30.6 30.4  --   --  30.4   MCHC g/dL 32.3  --  31.7 32.3 32.3 32.0  --   --  32.6   MPV fL 9.2  --  8.9 9.2 9.0 9.4  --   --  9.4   PLATELETS 10*3/mm3 290  --  299 280 295 304  --   --  327   RDW-SD fl 44.7  --  45.8 45.1 45.8 46.3  --   --  44.5    < > = values in this interval not displayed.       Results from last 7 days   Lab Units 02/07/23  0351 02/06/23  0340 02/05/23  1519   SODIUM mmol/L 141 143 142   POTASSIUM mmol/L 3.7 3.8 4.0   CHLORIDE mmol/L 107 108* 105   CO2 mmol/L 27.0 25.0 25.8   BUN mg/dL 10 15 17   CREATININE mg/dL 0.74 0.73 0.75   CALCIUM mg/dL 8.2* 8.5* 9.1   BILIRUBIN mg/dL  --   --  0.4   ALK PHOS U/L  --   --  70   ALT (SGPT) U/L  --   --  11   AST (SGOT) U/L  --   --  17   GLUCOSE mg/dL 99 97 108*           Results from last 7 days   Lab Units 02/08/23  0419 02/07/23  0378  02/07/23  0351 02/06/23  0340 02/05/23  1519   AST (SGOT) U/L  --   --   --   --  17   ALT (SGPT) U/L  --   --   --   --  11   PLATELETS 10*3/mm3 290 299 280   < > 327    < > = values in this interval not displayed.         Lab Results (last 24 hours)     Procedure Component Value Units Date/Time    CBC & Differential [[id number]]  (Abnormal) Collected: 02/08/23 0419    Specimen: Blood Updated: 02/08/23 0440    Narrative:      The following orders were created for panel order CBC & Differential.  Procedure                               Abnormality         Status                     ---------                               -----------         ------                     CBC Auto Differential[[id number]]        Abnormal            Final result                 Please view results for these tests on the individual orders.    CBC Auto Differential [[id number]]  (Abnormal) Collected: 02/08/23 0419    Specimen: Blood Updated: 02/08/23 0440     WBC 6.37 10*3/mm3      RBC 3.49 10*6/mm3      Hemoglobin 10.6 g/dL      Hematocrit 32.8 %      MCV 94.0 fL      MCH 30.4 pg      MCHC 32.3 g/dL      RDW 13.0 %      RDW-SD 44.7 fl      MPV 9.2 fL      Platelets 290 10*3/mm3      Neutrophil % 51.6 %      Lymphocyte % 30.6 %      Monocyte % 11.5 %      Eosinophil % 4.9 %      Basophil % 0.8 %      Immature Grans % 0.6 %      Neutrophils, Absolute 3.29 10*3/mm3      Lymphocytes, Absolute 1.95 10*3/mm3      Monocytes, Absolute 0.73 10*3/mm3      Eosinophils, Absolute 0.31 10*3/mm3      Basophils, Absolute 0.05 10*3/mm3      Immature Grans, Absolute 0.04 10*3/mm3      nRBC 0.0 /100 WBC     Hemoglobin & Hematocrit, Blood [[id number]]  (Abnormal) Collected: 02/07/23 1845    Specimen: Blood Updated: 02/07/23 1849     Hemoglobin 11.4 g/dL      Hematocrit 35.1 %     CBC (No Diff) [[id number]]  (Abnormal) Collected: 02/07/23 1209    Specimen: Blood Updated: 02/07/23 1226     WBC 6.82 10*3/mm3      RBC 3.62 10*6/mm3      Hemoglobin 10.9 g/dL       Hematocrit 34.4 %      MCV 95.0 fL      MCH 30.1 pg      MCHC 31.7 g/dL      RDW 13.0 %      RDW-SD 45.8 fl      MPV 8.9 fL      Platelets 299 10*3/mm3                     Results from last 7 days   Lab Units 02/05/23  1519   INR  0.96   APTT seconds 25.9                     No results found for: POCGLU                      Radiology:  Imaging Results (Last 24 Hours)     ** No results found for the last 24 hours. **          Cardiology:  ECG/EMG Results (last 24 hours)     ** No results found for the last 24 hours. **          I have reviewed recent labs results and consult notes.    Please note portions of this assessment/plan may have been copied and pasted, but I have personally seen this patient and reviewed each line of this assessment and plan for accuracy and made updates to reflect my necessary changes    Assessment and Plan:    .  Rectal bleeding likely diverticular.  Continues have intermittent bleeding for colonoscopy tomorrow    .  Anxiety disorder stable continue as needed Xanax        Much of this encounter note is an electronic transcription/translation of spoken language to printed text. The electronic translation of spoken language may permit erroneous, or at times, nonsensical words or phrases to be inadvertently transcribed; Although I have reviewed the note for such errors, some may still exist.    Note Disclaimer: At [facility], we believe that sharing information builds trust and better relationships. You are receiving this note because you recently visited [facility]. It is possible you will see health information before a provider has talked with you about it. This kind of information can be easy to misunderstand. To help you fully understand what it means for your health, we urge you to discuss this note with your provider.

## 2023-02-08 NOTE — PLAN OF CARE
Goal Outcome Evaluation:           Progress: improving  Outcome Evaluation: Miralax bowel prep started today. Tolerating clear liquid diet w/o complaints. IVFs started. To be NPO 2/9/2023 at 6am. Plan for colonoscopy tomorrow 2/9/22. All care explained. All questions answered. Pt verb understanding.

## 2023-02-09 ENCOUNTER — ANESTHESIA (OUTPATIENT)
Dept: PERIOP | Facility: HOSPITAL | Age: 63
End: 2023-02-09
Payer: COMMERCIAL

## 2023-02-09 LAB
ANION GAP SERPL CALCULATED.3IONS-SCNC: 6.7 MMOL/L (ref 5–15)
BUN SERPL-MCNC: 9 MG/DL (ref 8–23)
BUN/CREAT SERPL: 12.2 (ref 7–25)
CALCIUM SPEC-SCNC: 8.4 MG/DL (ref 8.6–10.5)
CHLORIDE SERPL-SCNC: 102 MMOL/L (ref 98–107)
CO2 SERPL-SCNC: 28.3 MMOL/L (ref 22–29)
CREAT SERPL-MCNC: 0.74 MG/DL (ref 0.57–1)
DEPRECATED RDW RBC AUTO: 45.1 FL (ref 37–54)
EGFRCR SERPLBLD CKD-EPI 2021: 91.6 ML/MIN/1.73
ERYTHROCYTE [DISTWIDTH] IN BLOOD BY AUTOMATED COUNT: 13 % (ref 12.3–15.4)
GLUCOSE SERPL-MCNC: 104 MG/DL (ref 65–99)
HCT VFR BLD AUTO: 31.2 % (ref 34–46.6)
HGB BLD-MCNC: 10 G/DL (ref 12–15.9)
MCH RBC QN AUTO: 30.6 PG (ref 26.6–33)
MCHC RBC AUTO-ENTMCNC: 32.1 G/DL (ref 31.5–35.7)
MCV RBC AUTO: 95.4 FL (ref 79–97)
PLATELET # BLD AUTO: 287 10*3/MM3 (ref 140–450)
PMV BLD AUTO: 9.7 FL (ref 6–12)
POTASSIUM SERPL-SCNC: 3.5 MMOL/L (ref 3.5–5.2)
RBC # BLD AUTO: 3.27 10*6/MM3 (ref 3.77–5.28)
SODIUM SERPL-SCNC: 137 MMOL/L (ref 136–145)
WBC NRBC COR # BLD: 7.23 10*3/MM3 (ref 3.4–10.8)

## 2023-02-09 PROCEDURE — 94761 N-INVAS EAR/PLS OXIMETRY MLT: CPT

## 2023-02-09 PROCEDURE — G0378 HOSPITAL OBSERVATION PER HR: HCPCS

## 2023-02-09 PROCEDURE — 80048 BASIC METABOLIC PNL TOTAL CA: CPT | Performed by: FAMILY MEDICINE

## 2023-02-09 PROCEDURE — 25010000002 PROPOFOL 200 MG/20ML EMULSION: Performed by: NURSE ANESTHETIST, CERTIFIED REGISTERED

## 2023-02-09 PROCEDURE — 45378 DIAGNOSTIC COLONOSCOPY: CPT | Performed by: INTERNAL MEDICINE

## 2023-02-09 PROCEDURE — 94799 UNLISTED PULMONARY SVC/PX: CPT

## 2023-02-09 PROCEDURE — 85027 COMPLETE CBC AUTOMATED: CPT | Performed by: FAMILY MEDICINE

## 2023-02-09 RX ORDER — SODIUM CHLORIDE, SODIUM LACTATE, POTASSIUM CHLORIDE, CALCIUM CHLORIDE 600; 310; 30; 20 MG/100ML; MG/100ML; MG/100ML; MG/100ML
100 INJECTION, SOLUTION INTRAVENOUS CONTINUOUS
Status: DISCONTINUED | OUTPATIENT
Start: 2023-02-09 | End: 2023-02-09

## 2023-02-09 RX ORDER — SODIUM CHLORIDE, SODIUM LACTATE, POTASSIUM CHLORIDE, CALCIUM CHLORIDE 600; 310; 30; 20 MG/100ML; MG/100ML; MG/100ML; MG/100ML
9 INJECTION, SOLUTION INTRAVENOUS CONTINUOUS PRN
Status: DISCONTINUED | OUTPATIENT
Start: 2023-02-09 | End: 2023-02-10 | Stop reason: HOSPADM

## 2023-02-09 RX ORDER — PROPOFOL 10 MG/ML
INJECTION, EMULSION INTRAVENOUS AS NEEDED
Status: DISCONTINUED | OUTPATIENT
Start: 2023-02-09 | End: 2023-02-09 | Stop reason: SURG

## 2023-02-09 RX ORDER — SODIUM CHLORIDE 9 MG/ML
40 INJECTION, SOLUTION INTRAVENOUS AS NEEDED
Status: DISCONTINUED | OUTPATIENT
Start: 2023-02-09 | End: 2023-02-09 | Stop reason: HOSPADM

## 2023-02-09 RX ORDER — LIDOCAINE HYDROCHLORIDE 10 MG/ML
0.5 INJECTION, SOLUTION EPIDURAL; INFILTRATION; INTRACAUDAL; PERINEURAL ONCE AS NEEDED
Status: DISCONTINUED | OUTPATIENT
Start: 2023-02-09 | End: 2023-02-09 | Stop reason: HOSPADM

## 2023-02-09 RX ORDER — SODIUM CHLORIDE 0.9 % (FLUSH) 0.9 %
10 SYRINGE (ML) INJECTION EVERY 12 HOURS SCHEDULED
Status: DISCONTINUED | OUTPATIENT
Start: 2023-02-09 | End: 2023-02-09 | Stop reason: HOSPADM

## 2023-02-09 RX ORDER — ONDANSETRON 2 MG/ML
4 INJECTION INTRAMUSCULAR; INTRAVENOUS ONCE AS NEEDED
Status: DISCONTINUED | OUTPATIENT
Start: 2023-02-09 | End: 2023-02-09 | Stop reason: HOSPADM

## 2023-02-09 RX ORDER — LIDOCAINE HYDROCHLORIDE 20 MG/ML
INJECTION, SOLUTION INTRAVENOUS AS NEEDED
Status: DISCONTINUED | OUTPATIENT
Start: 2023-02-09 | End: 2023-02-09 | Stop reason: SURG

## 2023-02-09 RX ORDER — SODIUM CHLORIDE 0.9 % (FLUSH) 0.9 %
10 SYRINGE (ML) INJECTION AS NEEDED
Status: DISCONTINUED | OUTPATIENT
Start: 2023-02-09 | End: 2023-02-09 | Stop reason: HOSPADM

## 2023-02-09 RX ADMIN — PROPOFOL INJECTABLE EMULSION 40 MG: 10 INJECTION, EMULSION INTRAVENOUS at 13:07

## 2023-02-09 RX ADMIN — Medication 10 ML: at 21:43

## 2023-02-09 RX ADMIN — PROPOFOL INJECTABLE EMULSION 60 MG: 10 INJECTION, EMULSION INTRAVENOUS at 13:04

## 2023-02-09 RX ADMIN — SODIUM CHLORIDE, POTASSIUM CHLORIDE, SODIUM LACTATE AND CALCIUM CHLORIDE 9 ML/HR: 600; 310; 30; 20 INJECTION, SOLUTION INTRAVENOUS at 12:12

## 2023-02-09 RX ADMIN — PROPOFOL INJECTABLE EMULSION 4 MG: 10 INJECTION, EMULSION INTRAVENOUS at 13:13

## 2023-02-09 RX ADMIN — ALPRAZOLAM 0.25 MG: 0.25 TABLET ORAL at 22:06

## 2023-02-09 RX ADMIN — PROPOFOL INJECTABLE EMULSION 40 MG: 10 INJECTION, EMULSION INTRAVENOUS at 13:17

## 2023-02-09 RX ADMIN — LIDOCAINE HYDROCHLORIDE 100 MG: 20 INJECTION, SOLUTION INTRAVENOUS at 13:04

## 2023-02-09 RX ADMIN — PROPOFOL INJECTABLE EMULSION 40 MG: 10 INJECTION, EMULSION INTRAVENOUS at 13:25

## 2023-02-09 RX ADMIN — PROPOFOL INJECTABLE EMULSION 40 MG: 10 INJECTION, EMULSION INTRAVENOUS at 13:21

## 2023-02-09 RX ADMIN — PROPOFOL INJECTABLE EMULSION 40 MG: 10 INJECTION, EMULSION INTRAVENOUS at 13:10

## 2023-02-09 NOTE — PLAN OF CARE
Goal Outcome Evaluation:  Plan of Care Reviewed With: patient        Progress: improving  Outcome Evaluation: Colonoscopy done today. No active bleed. Pt return to floor from PACU. Pt tolerating PO. Voiding w/o difficulty. All care  explained. All questions answered. Pt verb understanding. Anticipated discharge.

## 2023-02-09 NOTE — PROGRESS NOTES
Adult Nutrition  Assessment/PES    Patient Name:  Pamela Mack  YOB: 1960  MRN: [id number]  Admit Date:  2/5/2023    Assessment Date:  2/9/2023    Comments:  Adv diet as indicated post scope.  Will cont to follow and monitor     Reason for Assessment     Row Name 02/09/23 1119          Reason for Assessment    Reason For Assessment per organizational policy  NPO     Diagnosis gastrointestinal disease  Lower GIB hx divirticulosis                Nutrition/Diet History     Row Name 02/09/23 1119          Nutrition/Diet History    Typical Intake (Food/Fluid/EN/PN) Pt to have scope today, no needs at visit. NKFA. Reg diet PTA. no wt loss                Labs/Tests/Procedures/Meds     Row Name 02/09/23 1120          Labs/Procedures/Meds    Lab Results Reviewed reviewed        Diagnostic Tests/Procedures    Diagnostic Test/Procedure Reviewed reviewed        Medications    Pertinent Medications Reviewed reviewed     Pertinent Medications Comments miralax                  Estimated/Assessed Needs - Anthropometrics     Row Name 02/09/23 1120 02/09/23 0411       Anthropometrics    Weight --  204.8# 92.9 kg (204 lb 12.8 oz)       Estimated/Assessed Needs    Additional Documentation Estimated Calorie Needs (Group);Fluid Requirements (Group);Protein Requirements (Group) --       Estimated Calorie Needs    Estimated Calorie Requirement (kcal/day) 1807 kcal ( mifflin 1.2 ) --    Estimated Calorie Need Method [facility] --       Protein Requirements    Est Protein Requirement Amount (gms/kg) 0.8 gm protein  74 gm pro --       Fluid Requirements    Estimated Fluid Requirement Method RDA Method  1807 ml --               Nutrition Prescription Ordered     Row Name 02/09/23 1121          Nutrition Prescription PO    Current PO Diet NPO                Evaluation of Received Nutrient/Fluid Intake     Row Name 02/09/23 1121          Fluid Intake Evaluation    Oral Fluid (mL) 1743  ave x 3, 96%        PO Evaluation     Number of Meals 6     % PO Intake 83                   Problem/Interventions:   Problem 1     Row Name 02/09/23 1121          Nutrition Diagnoses Problem 1    Problem 1 Altered GI Function     Etiology (related to) Medical Diagnosis     Signs/Symptoms (evidenced by) Report/Observation;NPO                      Intervention Goal     Row Name 02/09/23 1122          Intervention Goal    General Meet nutritional needs for age/condition     PO Advance diet;PO intake (%)     PO Intake % 50 %  or greater                Nutrition Intervention     Row Name 02/09/23 1122          Nutrition Intervention    RD/Tech Action Follow Tx progress                  Education/Evaluation     Row Name 02/09/23 1122          Education    Education Other (comment)  pt npo        Monitor/Evaluation    Monitor Per protocol;I&O;PO intake;Pertinent labs;Weight;Symptoms     Education Follow-up Other (comment)  pt aware of NPO                 Electronically signed by:  Zoraida Zelaya RD  02/09/23 11:23 EST

## 2023-02-09 NOTE — ANESTHESIA PREPROCEDURE EVALUATION
Anesthesia Evaluation     Patient summary reviewed and Nursing notes reviewed   no history of anesthetic complications:  NPO Solid Status: > 8 hours  NPO Liquid Status: > 6 hours           Airway   Mallampati: I  TM distance: >3 FB  Neck ROM: full  No difficulty expected  Dental      Comment: One missing, crowns on molars      Pulmonary - normal exam    breath sounds clear to auscultation  (+) a smoker (quit 20 years ago) Former,   Sleep apnea: snores.  Cardiovascular - normal exam  Exercise tolerance: good (4-7 METS)    Rhythm: regular  Rate: normal        Neuro/Psych  (+) psychiatric history Anxiety,    Headaches: current.  GI/Hepatic/Renal/Endo    (+)  GERD (occ, TUMS as needed), GI bleeding lower , renal disease (cyst ),     Musculoskeletal     Abdominal  - normal exam   Substance History - negative use     OB/GYN negative ob/gyn ROS         Other   arthritis (knees),                      Anesthesia Plan    ASA 2     MAC     intravenous induction     Anesthetic plan, risks, benefits, and alternatives have been provided, discussed and informed consent has been obtained with: patient.    Use of blood products discussed with patient .       CODE STATUS:    Code Status (Patient has no pulse and is not breathing): CPR (Attempt to Resuscitate)  Medical Interventions (Patient has pulse or is breathing): Full Support

## 2023-02-09 NOTE — BRIEF OP NOTE
COLONOSCOPY  Progress Note    Pamela Frederick  2/9/2023    Pre-op Diagnosis:   Acute lower GI bleeding [K92.2]       Post-Op Diagnosis Codes:     * Acute lower GI bleeding [K92.2]     * Diverticulosis [K57.90]    Procedure/CPT® Codes:        Procedure(s):  COLONOSCOPY        Surgeon(s):  Celso Kendrick MD    Anesthesia: Monitored Anesthesia Care    Staff:   Circulator: Nery Jo RN  Scrub Person: Nereida Tavera         Estimated Blood Loss: none    Urine Voided: * No values recorded between 2/9/2023 12:56 PM and 2/9/2023  1:30 PM *    Specimens:                None          Drains: * No LDAs found *    Findings: Colon to TI good Prep  Pan-Colonic Diverticulosis  No Bleeding- Normal Mucosa        Complications: None          Celso Kendrick MD     Date: 2/9/2023  Time: 13:34 EST

## 2023-02-09 NOTE — PROGRESS NOTES
"Daily Progress Note:      Chief complaint: Rectal bleeding diverticulosis    Subjective: No new complaints.  States her bleeding stopped last night about 9:00.     LOS: 0 days     Vital Signs  Temp:  [97.7 °F (36.5 °C)-98.2 °F (36.8 °C)] 97.7 °F (36.5 °C)  Heart Rate:  [73-77] 77  Resp:  [18] 18  BP: (114-125)/(60-70) 114/60  Oxygen Therapy  SpO2: 97 %  Pulse Oximetry Type: Intermittent  Device (Oxygen Therapy): room air}  Body mass index is 33.07 kg/m².  Flowsheet Rows    Flowsheet Row First Filed Value   Admission Height 167.6 cm (66\") Documented at 02/05/2023 1244   Admission Weight 93.9 kg (207 lb) Documented at 02/05/2023 1244                   Documented weights    02/05/23 1244 02/05/23 1753 02/05/23 1800 02/06/23 0609   Weight: 93.9 kg (207 lb) 93.6 kg (206 lb 5.6 oz) 94.5 kg (208 lb 6.4 oz) 94.3 kg (207 lb 14.4 oz)    02/08/23 0700 02/09/23 0411   Weight: 94.7 kg (208 lb 12.8 oz) 92.9 kg (204 lb 12.8 oz)           Patient Vitals for the past 24 hrs:   BP Temp Temp src Pulse Resp SpO2 Weight   02/09/23 0411 114/60 97.7 °F (36.5 °C) Oral 77 18 97 % 92.9 kg (204 lb 12.8 oz)   02/08/23 2023 119/70 98.2 °F (36.8 °C) Oral 73 18 97 % --   02/08/23 1500 125/65 98.2 °F (36.8 °C) Oral 77 18 99 % --       92.9 kg (204 lb 12.8 oz)    Intake/Output                       02/07/23 0701 - 02/08/23 0700 02/08/23 0701 - 02/09/23 0700     [id number] 7217-7357 Total 4956-2193 8898-1492 Total                 Intake    P.O.  1520  -- 1520  3770  120 3890    I.V.  --  -- --  --  1461.3 1461.3    Total Intake 1520 -- 1520 3770 1581.3 5351.3       Output    Urine  200  -- 200  400  200 600    Total Output 200 -- 200 400 200 600           Intake/Output Summary (Last 24 hours) at 2/9/2023 0801  Last data filed at 2/9/2023 0616  Gross per 24 hour   Intake 4551.25 ml   Output 600 ml   Net 3951.25 ml        Intake/Output Summary (Last 24 hours) at 2/9/2023 0801  Last data filed at 2/9/2023 0616  Gross per 24 hour   Intake 4551.25 ml "   Output 600 ml   Net 3951.25 ml        Review of Systems   Constitutional: Negative for activity change, appetite change and fatigue.   HENT: Negative for congestion.    Respiratory: Negative for cough, chest tightness, shortness of breath and wheezing.    Cardiovascular: Negative for chest pain.   Gastrointestinal: Positive for blood in stool. Negative for abdominal distention, abdominal pain, diarrhea, nausea and vomiting.   Endocrine: Negative for polyphagia and polyuria.   Genitourinary: Negative for frequency.   Skin: Negative for rash.   Neurological: Negative for light-headedness.   Hematological: Does not bruise/bleed easily.   Psychiatric/Behavioral: Negative for agitation and behavioral problems.       Physical Exam  Vitals and nursing note reviewed.   Constitutional:       Appearance: She is well-developed. She is obese.   HENT:      Head: Normocephalic.   Eyes:      Conjunctiva/sclera: Conjunctivae normal.   Neck:      Thyroid: No thyromegaly.      Vascular: No JVD.   Cardiovascular:      Rate and Rhythm: Normal rate and regular rhythm.      Heart sounds: Normal heart sounds. No murmur heard.  Pulmonary:      Effort: Pulmonary effort is normal. No respiratory distress.      Breath sounds: Normal breath sounds. No wheezing or rales.   Abdominal:      General: Bowel sounds are normal. There is no distension.      Palpations: Abdomen is soft.      Tenderness: There is no abdominal tenderness. There is no guarding.   Skin:     General: Skin is warm and dry.      Findings: No rash.   Neurological:      Mental Status: She is alert.         Medication Review:   I have reviewed the patient's current medication list  Scheduled Meds:cetirizine, 10 mg, Oral, Daily  sodium chloride, 10 mL, Intravenous, Q12H      Continuous Infusions:lactated ringers, 75 mL/hr, Last Rate: 75 mL/hr (02/09/23 0616)      PRN Meds:.•  acetaminophen **OR** acetaminophen **OR** acetaminophen  •  ALPRAZolam  •  aluminum-magnesium  hydroxide-simethicone  •  melatonin  •  ondansetron **OR** ondansetron  •  sodium chloride  •  [COMPLETED] Insert Peripheral IV **AND** sodium chloride  •  sodium chloride      Result Review        I have personally reviewed the results from the time of this admission to 2/9/2023 08:01 EST and agree with these findings:  [x]  Laboratory  []  Microbiology  [x]  Radiology  []  EKG/Telemetry   []  Cardiology/Vascular   []  Pathology  []  Old records  []  Other:          Labs:  Results from last 7 days   Lab Units 02/09/23  0348 02/08/23  0419 02/07/23  1845 02/07/23  1209 02/07/23  0351 02/06/23  1230 02/06/23  0340 02/05/23  1714 02/05/23  1519   WBC 10*3/mm3 7.23 6.37  --  6.82 6.21 6.89 7.53  --  8.72   RBC 10*6/mm3 3.27* 3.49*  --  3.62* 3.53* 3.66* 3.65*  --  4.18   HEMOGLOBIN g/dL 10.0* 10.6* 11.4* 10.9* 10.8* 11.2* 11.1*   < > 12.7   HEMATOCRIT % 31.2* 32.8* 35.1 34.4 33.4* 34.7 34.7   < > 39.0   RDW % 13.0 13.0  --  13.0 13.1 13.2 13.0  --  13.0   MCV fL 95.4 94.0  --  95.0 94.6 94.8 95.1  --  93.3   MCH pg 30.6 30.4  --  30.1 30.6 30.6 30.4  --  30.4   MCHC g/dL 32.1 32.3  --  31.7 32.3 32.3 32.0  --  32.6   MPV fL 9.7 9.2  --  8.9 9.2 9.0 9.4  --  9.4   PLATELETS 10*3/mm3 287 290  --  299 280 295 304  --  327   RDW-SD fl 45.1 44.7  --  45.8 45.1 45.8 46.3  --  44.5    < > = values in this interval not displayed.       Results from last 7 days   Lab Units 02/09/23  0348 02/07/23  0351 02/06/23  0340 02/05/23  1519   SODIUM mmol/L 137 141 143 142   POTASSIUM mmol/L 3.5 3.7 3.8 4.0   CHLORIDE mmol/L 102 107 108* 105   CO2 mmol/L 28.3 27.0 25.0 25.8   BUN mg/dL 9 10 15 17   CREATININE mg/dL 0.74 0.74 0.73 0.75   CALCIUM mg/dL 8.4* 8.2* 8.5* 9.1   BILIRUBIN mg/dL  --   --   --  0.4   ALK PHOS U/L  --   --   --  70   ALT (SGPT) U/L  --   --   --  11   AST (SGOT) U/L  --   --   --  17   GLUCOSE mg/dL 104* 99 97 108*           Results from last 7 days   Lab Units 02/09/23  0348 02/08/23  0419 02/07/23  1209  02/06/23  0340 02/05/23  1519   AST (SGOT) U/L  --   --   --   --  17   ALT (SGPT) U/L  --   --   --   --  11   PLATELETS 10*3/mm3 287 290 299   < > 327    < > = values in this interval not displayed.         Lab Results (last 24 hours)     Procedure Component Value Units Date/Time    Basic Metabolic Panel [[id number]]  (Abnormal) Collected: 02/09/23 0348    Specimen: Blood Updated: 02/09/23 0525     Glucose 104 mg/dL      BUN 9 mg/dL      Creatinine 0.74 mg/dL      Sodium 137 mmol/L      Potassium 3.5 mmol/L      Chloride 102 mmol/L      CO2 28.3 mmol/L      Calcium 8.4 mg/dL      BUN/Creatinine Ratio 12.2     Anion Gap 6.7 mmol/L      eGFR 91.6 mL/min/1.73     Narrative:      GFR Normal >60  Chronic Kidney Disease <60  Kidney Failure <15      CBC (No Diff) [[id number]]  (Abnormal) Collected: 02/09/23 0348    Specimen: Blood Updated: 02/09/23 0500     WBC 7.23 10*3/mm3      RBC 3.27 10*6/mm3      Hemoglobin 10.0 g/dL      Hematocrit 31.2 %      MCV 95.4 fL      MCH 30.6 pg      MCHC 32.1 g/dL      RDW 13.0 %      RDW-SD 45.1 fl      MPV 9.7 fL      Platelets 287 10*3/mm3                     Results from last 7 days   Lab Units 02/05/23  1519   INR  0.96   APTT seconds 25.9                     No results found for: POCGLU                      Radiology:  Imaging Results (Last 24 Hours)     ** No results found for the last 24 hours. **          Cardiology:  ECG/EMG Results (last 24 hours)     ** No results found for the last 24 hours. **          I have reviewed recent labs results and consult notes.    Please note portions of this assessment/plan may have been copied and pasted, but I have personally seen this patient and reviewed each line of this assessment and plan for accuracy and made updates to reflect my necessary changes    Assessment and Plan:    .  Rectal bleeding likely diverticular.  Colonoscopy today her hemoglobin is gradually decreasing.  Home depending on results of C-scope    .  Anxiety disorder  stable continue as needed Xanax        Much of this encounter note is an electronic transcription/translation of spoken language to printed text. The electronic translation of spoken language may permit erroneous, or at times, nonsensical words or phrases to be inadvertently transcribed; Although I have reviewed the note for such errors, some may still exist.    Note Disclaimer: At [facility], we believe that sharing information builds trust and better relationships. You are receiving this note because you recently visited [facility]. It is possible you will see health information before a provider has talked with you about it. This kind of information can be easy to misunderstand. To help you fully understand what it means for your health, we urge you to discuss this note with your provider.

## 2023-02-09 NOTE — ANESTHESIA POSTPROCEDURE EVALUATION
Patient: Pamela Mack    Procedure Summary     Date: 02/09/23 Room / Location: [facility] ENDOSCOPY 1 /  [facility]    Anesthesia Start: 1258 Anesthesia Stop: 1333    Procedure: COLONOSCOPY Diagnosis:       Acute lower GI bleeding      Diverticulosis      (Acute lower GI bleeding [K92.2])    Surgeons: Celso Kendrick MD Provider: Liliya Amaya CRNA    Anesthesia Type: MAC ASA Status: 2          Anesthesia Type: MAC    Vitals  Vitals Value Taken Time   /69 02/09/23 1350   Temp 97.8 °F (36.6 °C) 02/09/23 1336   Pulse 71 02/09/23 1350   Resp 16 02/09/23 1350   SpO2 99 % 02/09/23 1350           Post Anesthesia Care and Evaluation    Patient location during evaluation: PHASE II  Patient participation: complete - patient participated  Level of consciousness: awake and alert  Pain score: 0  Pain management: satisfactory to patient    Airway patency: fixed obstruction  Anesthetic complications: No anesthetic complications  PONV Status: none  Cardiovascular status: acceptable  Respiratory status: acceptable  Hydration status: acceptable

## 2023-02-10 VITALS
BODY MASS INDEX: 33.07 KG/M2 | DIASTOLIC BLOOD PRESSURE: 69 MMHG | RESPIRATION RATE: 19 BRPM | HEIGHT: 66 IN | HEART RATE: 76 BPM | TEMPERATURE: 98 F | WEIGHT: 205.8 LBS | SYSTOLIC BLOOD PRESSURE: 128 MMHG | OXYGEN SATURATION: 94 %

## 2023-02-10 PROBLEM — F43.21 GRIEF: Status: RESOLVED | Noted: 2020-08-17 | Resolved: 2023-02-10

## 2023-02-10 LAB
DEPRECATED RDW RBC AUTO: 45.1 FL (ref 37–54)
ERYTHROCYTE [DISTWIDTH] IN BLOOD BY AUTOMATED COUNT: 13 % (ref 12.3–15.4)
HCT VFR BLD AUTO: 31.3 % (ref 34–46.6)
HGB BLD-MCNC: 10.2 G/DL (ref 12–15.9)
IRON 24H UR-MRATE: 32 MCG/DL (ref 37–145)
IRON SATN MFR SERPL: 14 % (ref 20–50)
MCH RBC QN AUTO: 31 PG (ref 26.6–33)
MCHC RBC AUTO-ENTMCNC: 32.6 G/DL (ref 31.5–35.7)
MCV RBC AUTO: 95.1 FL (ref 79–97)
PLATELET # BLD AUTO: 295 10*3/MM3 (ref 140–450)
PMV BLD AUTO: 9.1 FL (ref 6–12)
RBC # BLD AUTO: 3.29 10*6/MM3 (ref 3.77–5.28)
TIBC SERPL-MCNC: 222 MCG/DL (ref 298–536)
UIBC SERPL-MCNC: 190 MCG/DL (ref 112–346)
WBC NRBC COR # BLD: 6.52 10*3/MM3 (ref 3.4–10.8)

## 2023-02-10 PROCEDURE — G0378 HOSPITAL OBSERVATION PER HR: HCPCS

## 2023-02-10 PROCEDURE — 83540 ASSAY OF IRON: CPT | Performed by: FAMILY MEDICINE

## 2023-02-10 PROCEDURE — 83550 IRON BINDING TEST: CPT | Performed by: FAMILY MEDICINE

## 2023-02-10 PROCEDURE — 85027 COMPLETE CBC AUTOMATED: CPT | Performed by: FAMILY MEDICINE

## 2023-02-10 RX ORDER — IRON POLYSACCHARIDE COMPLEX 150 MG
150 CAPSULE ORAL DAILY
Qty: 30 CAPSULE | Refills: 0 | Status: SHIPPED | OUTPATIENT
Start: 2023-02-10

## 2023-02-10 NOTE — PROGRESS NOTES
"Daily Progress Note:      Chief complaint: Rectal bleeding diverticulosis    Subjective: No further bleeding.  Colonoscopy showed pancolonic diverticulosis tolerating regular diet     LOS: 0 days     Vital Signs  Temp:  [97.5 °F (36.4 °C)-98.7 °F (37.1 °C)] 98 °F (36.7 °C)  Heart Rate:  [71-89] 76  Resp:  [12-19] 19  BP: (100-147)/(54-88) 128/69  Oxygen Therapy  SpO2: 94 %  Pulse Oximetry Type: Intermittent  Device (Oxygen Therapy): room air  Flow (L/min): 2  ETCO2 (mmHg): 39 mmHg}  Body mass index is 33.23 kg/m².  Flowsheet Rows    Flowsheet Row First Filed Value   Admission Height 167.6 cm (66\") Documented at 02/05/2023 1244   Admission Weight 93.9 kg (207 lb) Documented at 02/05/2023 1244                   Documented weights    02/05/23 1244 02/05/23 1753 02/05/23 1800 02/06/23 0609   Weight: 93.9 kg (207 lb) 93.6 kg (206 lb 5.6 oz) 94.5 kg (208 lb 6.4 oz) 94.3 kg (207 lb 14.4 oz)    02/08/23 0700 02/09/23 0411 02/10/23 0655   Weight: 94.7 kg (208 lb 12.8 oz) 92.9 kg (204 lb 12.8 oz) 93.4 kg (205 lb 12.8 oz)           Patient Vitals for the past 24 hrs:   BP Temp Temp src Pulse Resp SpO2 Weight   02/10/23 0655 -- -- -- -- -- -- 93.4 kg (205 lb 12.8 oz)   02/10/23 0536 128/69 98 °F (36.7 °C) Oral 76 19 94 % --   02/09/23 2335 100/56 97.5 °F (36.4 °C) Oral 83 18 96 % --   02/09/23 2253 -- -- -- -- -- 96 % --   02/09/23 1912 127/64 98.7 °F (37.1 °C) Oral 89 19 97 % --   02/09/23 1646 -- -- -- -- -- 98 % --   02/09/23 1520 147/69 -- -- -- 16 98 % --   02/09/23 1514 146/68 -- -- 84 16 98 % --   02/09/23 1459 129/59 -- -- 75 16 98 % --   02/09/23 1444 123/58 -- -- 78 16 96 % --   02/09/23 1424 129/72 98.3 °F (36.8 °C) Oral 75 14 98 % --   02/09/23 1400 122/88 -- -- 74 16 99 % --   02/09/23 1350 133/69 -- -- 71 16 99 % --   02/09/23 1340 125/70 -- -- 73 12 98 % --   02/09/23 1336 116/73 97.8 °F (36.6 °C) Oral 79 12 100 % --   02/09/23 1200 134/54 98.1 °F (36.7 °C) Oral 84 12 98 % --       93.4 kg (205 lb 12.8 " oz)    Intake/Output                       02/08/23 0701 - 02/09/23 0700 02/09/23 0701 - 02/10/23 0700     0007-9310 6665-3726 Total 3245-5435 3655-4749 Total                 Intake    P.O.  3770  120 3890  1220  600 1820    I.V.  --  1461.3 1461.3  350  -- 350    Total Intake 3770 1581.3 5351.3 3761 662 2998       Output    Urine  400  200 600  --  500 500    Total Output 400 200 600 -- 500 500           Intake/Output Summary (Last 24 hours) at 2/10/2023 0751  Last data filed at 2/10/2023 0600  Gross per 24 hour   Intake 2170 ml   Output 500 ml   Net 1670 ml        Intake/Output Summary (Last 24 hours) at 2/10/2023 0751  Last data filed at 2/10/2023 0600  Gross per 24 hour   Intake 2170 ml   Output 500 ml   Net 1670 ml        Review of Systems   Constitutional: Negative for activity change, appetite change and fatigue.   HENT: Negative for congestion.    Respiratory: Negative for cough, chest tightness, shortness of breath and wheezing.    Cardiovascular: Negative for chest pain.   Gastrointestinal: Negative for abdominal distention, abdominal pain, blood in stool, diarrhea, nausea and vomiting.   Endocrine: Negative for polyphagia and polyuria.   Genitourinary: Negative for frequency.   Skin: Negative for rash.   Neurological: Negative for light-headedness.   Hematological: Does not bruise/bleed easily.   Psychiatric/Behavioral: Negative for agitation and behavioral problems.       Physical Exam  Vitals and nursing note reviewed.   Constitutional:       Appearance: She is well-developed. She is obese.   HENT:      Head: Normocephalic.   Eyes:      Conjunctiva/sclera: Conjunctivae normal.   Neck:      Thyroid: No thyromegaly.      Vascular: No JVD.   Cardiovascular:      Rate and Rhythm: Normal rate and regular rhythm.      Heart sounds: Normal heart sounds. No murmur heard.  Pulmonary:      Effort: Pulmonary effort is normal. No respiratory distress.      Breath sounds: Normal breath sounds. No wheezing or rales.    Abdominal:      General: Bowel sounds are normal. There is no distension.      Palpations: Abdomen is soft.      Tenderness: There is no abdominal tenderness. There is no guarding.   Skin:     General: Skin is warm and dry.      Findings: No rash.   Neurological:      Mental Status: She is alert.         Medication Review:   I have reviewed the patient's current medication list  Scheduled Meds:cetirizine, 10 mg, Oral, Daily  sodium chloride, 10 mL, Intravenous, Q12H      Continuous Infusions:lactated ringers, 9 mL/hr, Last Rate: 9 mL/hr (02/09/23 1258)      PRN Meds:.•  acetaminophen **OR** acetaminophen **OR** acetaminophen  •  ALPRAZolam  •  aluminum-magnesium hydroxide-simethicone  •  lactated ringers  •  melatonin  •  ondansetron **OR** ondansetron  •  sodium chloride  •  [COMPLETED] Insert Peripheral IV **AND** sodium chloride  •  sodium chloride      Result Review        I have personally reviewed the results from the time of this admission to 2/10/2023 07:51 EST and agree with these findings:  [x]  Laboratory  []  Microbiology  [x]  Radiology  []  EKG/Telemetry   []  Cardiology/Vascular   []  Pathology  []  Old records  []  Other:          Labs:  Results from last 7 days   Lab Units 02/10/23  0729 02/09/23  0348 02/08/23  0419 02/07/23  1845 02/07/23  1209 02/07/23  0351 02/06/23  1230 02/06/23  0340   WBC 10*3/mm3 6.52 7.23 6.37  --  6.82 6.21 6.89 7.53   RBC 10*6/mm3 3.29* 3.27* 3.49*  --  3.62* 3.53* 3.66* 3.65*   HEMOGLOBIN g/dL 10.2* 10.0* 10.6* 11.4* 10.9* 10.8* 11.2* 11.1*   HEMATOCRIT % 31.3* 31.2* 32.8* 35.1 34.4 33.4* 34.7 34.7   RDW % 13.0 13.0 13.0  --  13.0 13.1 13.2 13.0   MCV fL 95.1 95.4 94.0  --  95.0 94.6 94.8 95.1   MCH pg 31.0 30.6 30.4  --  30.1 30.6 30.6 30.4   MCHC g/dL 32.6 32.1 32.3  --  31.7 32.3 32.3 32.0   MPV fL 9.1 9.7 9.2  --  8.9 9.2 9.0 9.4   PLATELETS 10*3/mm3 295 287 290  --  299 280 295 304   RDW-SD fl 45.1 45.1 44.7  --  45.8 45.1 45.8 46.3       Results from last 7 days    Lab Units 02/09/23  0348 02/07/23  0351 02/06/23  0340 02/05/23  1519   SODIUM mmol/L 137 141 143 142   POTASSIUM mmol/L 3.5 3.7 3.8 4.0   CHLORIDE mmol/L 102 107 108* 105   CO2 mmol/L 28.3 27.0 25.0 25.8   BUN mg/dL 9 10 15 17   CREATININE mg/dL 0.74 0.74 0.73 0.75   CALCIUM mg/dL 8.4* 8.2* 8.5* 9.1   BILIRUBIN mg/dL  --   --   --  0.4   ALK PHOS U/L  --   --   --  70   ALT (SGPT) U/L  --   --   --  11   AST (SGOT) U/L  --   --   --  17   GLUCOSE mg/dL 104* 99 97 108*           Results from last 7 days   Lab Units 02/10/23  0729 02/09/23 0348 02/08/23  0419 02/06/23  0340 02/05/23  1519   AST (SGOT) U/L  --   --   --   --  17   ALT (SGPT) U/L  --   --   --   --  11   PLATELETS 10*3/mm3 295 287 290   < > 327    < > = values in this interval not displayed.         Lab Results (last 24 hours)     Procedure Component Value Units Date/Time    CBC (No Diff) [[id number]]  (Abnormal) Collected: 02/10/23 0729    Specimen: Blood Updated: 02/10/23 0741     WBC 6.52 10*3/mm3      RBC 3.29 10*6/mm3      Hemoglobin 10.2 g/dL      Hematocrit 31.3 %      MCV 95.1 fL      MCH 31.0 pg      MCHC 32.6 g/dL      RDW 13.0 %      RDW-SD 45.1 fl      MPV 9.1 fL      Platelets 295 10*3/mm3     Iron Profile [[id number]] Collected: 02/10/23 0729    Specimen: Blood Updated: 02/10/23 0731                    Results from last 7 days   Lab Units 02/05/23 1519   INR  0.96   APTT seconds 25.9                     No results found for: POCGLU                      Radiology:  Imaging Results (Last 24 Hours)     ** No results found for the last 24 hours. **          Cardiology:  ECG/EMG Results (last 24 hours)     ** No results found for the last 24 hours. **          I have reviewed recent labs results and consult notes.    Please note portions of this assessment/plan may have been copied and pasted, but I have personally seen this patient and reviewed each line of this assessment and plan for accuracy and made updates to reflect my necessary  changes    Assessment and Plan:    .  Rectal bleeding likely diverticular.  Colonoscopy negative except for pandiverticulosis likely diverticular bleed okay discharge home today    .  Anxiety disorder stable continue as needed Xanax        Much of this encounter note is an electronic transcription/translation of spoken language to printed text. The electronic translation of spoken language may permit erroneous, or at times, nonsensical words or phrases to be inadvertently transcribed; Although I have reviewed the note for such errors, some may still exist.    Note Disclaimer: At [facility], we believe that sharing information builds trust and better relationships. You are receiving this note because you recently visited [facility]. It is possible you will see health information before a provider has talked with you about it. This kind of information can be easy to misunderstand. To help you fully understand what it means for your health, we urge you to discuss this note with your provider.

## 2023-02-10 NOTE — CASE MANAGEMENT/SOCIAL WORK
Case Management Discharge Note      Final Note: dc home    Provided Post Acute Provider List?: N/A  Provided Post Acute Provider Quality & Resource List?: N/A    Selected Continued Care - Discharged on 2/10/2023 Admission date: 2/5/2023 - Discharge disposition: Home or Self Care    Destination    No services have been selected for the patient.              Durable Medical Equipment    No services have been selected for the patient.              Dialysis/Infusion    No services have been selected for the patient.              Home Medical Care    No services have been selected for the patient.              Therapy    No services have been selected for the patient.              Community Resources    No services have been selected for the patient.              Community & DME    No services have been selected for the patient.                       Final Discharge Disposition Code: 01 - home or self-care

## 2023-02-10 NOTE — PLAN OF CARE
Goal Outcome Evaluation:  Plan of Care Reviewed With: patient        Progress: improving  Outcome Evaluation: No BM's overnight. Pt rested well and is hopeful to go home today. Rested well. VSS.

## 2023-02-10 NOTE — PROGRESS NOTES
Patient: Pamela Mazariegosn    @A@    Anesthesia Type: MAC  Patient location: [facility] Surgical Floor  Last vitals  BP      Temp      Pulse     Resp      SpO2        Post vital signs: stable  Level of consciousness: awake, alert and oriented    Post-anesthesia pain: adequate analgesia  Airway patency: patent  Respiratory: unassisted  Cardiovascular: stable and blood pressure at baseline  Hydration: euvolemic    Difficult Airway: no  Anesthetic complications: no

## 2023-02-11 ENCOUNTER — READMISSION MANAGEMENT (OUTPATIENT)
Dept: CALL CENTER | Facility: HOSPITAL | Age: 63
End: 2023-02-11
Payer: COMMERCIAL

## 2023-02-11 NOTE — DISCHARGE SUMMARY
Pamela Frederick  1960  [id number]        Discharge Summary    Date of Admission: 2/5/2023  Date of Discharge:  2/10/2023    Primary Discharge Diagnoses:     Lower GI bleeding likely diverticular  Mild blood loss anemia secondary to above      PCP  Patient Care Team:  Renata Gong MD as PCP - General  Renata Gong MD as PCP - Family Medicine    Consults:   Consults     No orders found from 1/7/2023 to 2/6/2023.            History of Present Illness:  63 Y female relatively healthy history of diverticulosis in the emergency room with sudden onset of bright red blood blood per rectum.  She had several bowel movements with  and large amounts of blood.  She had a fever chills abdominal pain nausea vomiting.  She has known history of diverticulosis with last colonoscopy was 2019.    Hospital Course    Patient admitted to hospital started on IV fluids serial H&H's.  Hemoglobin remained relatively stable above 11 for several days.  She had intermittent rectal bleeding for about 48 hours which resolved.  She also outpatient consultation felt conservative management and to monitor as her hemoglobin is relatively stable.  However on 2/7/2023 started having bloody bowel movements again but not large volume.  Hemoglobin remained stable.  She was prepped for colonoscopy underwent a colonoscopy on 2023 and colonoscopy unremarkable no overt bleeding noted.  Post colonoscopy started regular diet which was tolerated no further bleeding discharge due to satisfied condition.  Hemoglobin did go down to 10.2 and she was iron deficiency start oral iron on discharge.      Operations and Procedures Performed:  Procedure(s):  COLONOSCOPY  02/09 1249 COLONOSCOPY  CT Abdomen Pelvis With Contrast    Result Date: 2/5/2023  Narrative: CT Abdomen Pelvis W INDICATION: Lower GI bleed with diverticulosis;Gastrointestinal hemorrhage, unspecified TECHNIQUE: CT of the abdomen and pelvis with IV contrast. Coronal and  sagittal reconstructions were obtained.  Radiation dose reduction techniques included automated exposure control or exposure modulation based on body size. Count of known CT and cardiac nuc med studies performed in previous 12 months: 1. COMPARISON: CT abdomen and pelvis 8/31/2022. FINDINGS: Lower chest: Unremarkable. Liver: Unremarkable. Gallbladder and bile ducts: Surgically absent gallbladder with mildly dilated bile ducts, likely related to postcholecystectomy state. Spleen: Unremarkable. Pancreas: Unremarkable. Adrenals: Unremarkable. Kidneys and ureters: Small left-sided renal cysts one of which contains peripheral calcification or milk of calcium, unchanged. No hydronephrosis. Bowel: Small hiatal hernia. Colonic diverticulosis without associated colonic wall thickening or surrounding stranding.Normal appendix. Bladder: Unremarkable. Reproductive organs: Normal uterus. No adnexal masses. Bilateral Essure devices. Lymph nodes: Unremarkable. Peritoneum: Unremarkable. Vessels: Mild atherosclerosis. Abdominal wall: Unremarkable. Bones: Multilevel degenerative changes of the lumbar spine.     Impression: 1.  No acute CT abnormality in the abdomen or pelvis. 2.  Colonic diverticulosis without evidence of acute diverticulitis. Signer Name: Mk Watts MD  Signed: 2/5/2023 6:06 PM  Workstation Name: [id number]  [facility]    Mammo Screening Digital Tomosynthesis Bilateral With CAD    Result Date: 2/1/2023  Narrative: EXAM, 2/1/2023: 1. Bilateral digital screening mammogram with CAD. 2. Bilateral digital screening breast tomosynthesis.  INDICATION: Routine screening.  TECHNIQUE: Bilateral digital screening mammography including breast tomosynthesis and CAD review.  COMPARISON: *  Screening mammogram, 1/20/2022 and 12/18/2020.  FINDINGS: The breasts are almost entirely fatty. Stable scattered tiny benign nodularity in the axillary portions of each breast. No significant change when compared with  prior images. No mammographic evidence of malignancy. Recommend repeat screening mammogram in one year.      Impression: Benign screening mammogram.  BI-RADS CATEGORY 2: Benign Findings.   Women over the age of 40 undergoing screening mammography are entered into a reminder system with target due date for the next mammogram.  This report was finalized on 2/1/2023 5:04 PM by Dr. Fahad Flores MD.        Labs:  Results from last 7 days   Lab Units 02/10/23  0729 02/09/23  0348 02/08/23  0419 02/07/23  1845 02/07/23  1209 02/07/23  0351 02/06/23  1230 02/06/23  0340   WBC 10*3/mm3 6.52 7.23 6.37  --  6.82 6.21 6.89 7.53   HEMOGLOBIN g/dL 10.2* 10.0* 10.6* 11.4* 10.9* 10.8* 11.2* 11.1*   HEMATOCRIT % 31.3* 31.2* 32.8* 35.1 34.4 33.4* 34.7 34.7   PLATELETS 10*3/mm3 295 287 290  --  299 280 295 304     Results from last 7 days   Lab Units 02/09/23  0348 02/07/23  0351 02/06/23  0340 02/05/23  1519   SODIUM mmol/L 137 141 143 142   POTASSIUM mmol/L 3.5 3.7 3.8 4.0   CHLORIDE mmol/L 102 107 108* 105   CO2 mmol/L 28.3 27.0 25.0 25.8   BUN mg/dL 9 10 15 17   CREATININE mg/dL 0.74 0.74 0.73 0.75   CALCIUM mg/dL 8.4* 8.2* 8.5* 9.1   BILIRUBIN mg/dL  --   --   --  0.4   ALK PHOS U/L  --   --   --  70   ALT (SGPT) U/L  --   --   --  11   AST (SGOT) U/L  --   --   --  17   GLUCOSE mg/dL 104* 99 97 108*           Lab Results (last 24 hours)     Procedure Component Value Units Date/Time    Iron Profile [[id number]]  (Abnormal) Collected: 02/10/23 0729    Specimen: Blood Updated: 02/10/23 0755     Iron 32 mcg/dL      Iron Saturation 14 %      TIBC 222 mcg/dL      UIBC 190 mcg/dL     CBC (No Diff) [[id number]]  (Abnormal) Collected: 02/10/23 0729    Specimen: Blood Updated: 02/10/23 0741     WBC 6.52 10*3/mm3      RBC 3.29 10*6/mm3      Hemoglobin 10.2 g/dL      Hematocrit 31.3 %      MCV 95.1 fL      MCH 31.0 pg      MCHC 32.6 g/dL      RDW 13.0 %      RDW-SD 45.1 fl      MPV 9.1 fL      Platelets 295 10*3/mm3                      Results from last 7 days   Lab Units 02/05/23  1519   INR  0.96   APTT seconds 25.9               Invalid input(s): LDLCALC          No results found for: POCGLU                      Radiology:  Imaging Results (Last 24 Hours)     ** No results found for the last 24 hours. **          PROCEDURES  Procedure(s):  COLONOSCOPY        Allergies:  is allergic to erythromycin.      Discharge Medications:     Your medication list      START taking these medications      Instructions Last Dose Given Next Dose Due   iron polysaccharides 150 MG capsule  Commonly known as: IFerex 150      Take 1 capsule by mouth Daily.          CONTINUE taking these medications      Instructions Last Dose Given Next Dose Due   ALPRAZolam 0.25 MG tablet  Commonly known as: XANAX      Xanax 0.25 mg tablet   Take 1 tablet every day by oral route as needed.       Calcium Carbonate-Vitamin D 600-200 MG-UNIT tablet      Every 12 (Twelve) Hours.       cetirizine 10 MG tablet  Commonly known as: zyrTEC      cetirizine 10 mg tablet   Take 1 tablet every day by oral route.       estradiol 0.1 MG/GM vaginal cream  Commonly known as: ESTRACE      Apply pea sized amount to urethra twice a week before bedtime       fluticasone 50 MCG/ACT nasal spray  Commonly known as: FLONASE      fluticasone propionate 50 mcg/actuation nasal spray,suspension   1-2 sprays each nostril daily          STOP taking these medications    chlorhexidine 0.12 % solution  Commonly known as: PERIDEX        meloxicam 7.5 MG tablet  Commonly known as: MOBIC              Where to Get Your Medications      These medications were sent to [id number] - [place], [place] - [address] AT [facility] [address] - [phone]  - [phone] FX  [address] [place] [address] [place] [address]    Phone: [phone]   · iron polysaccharides 150 MG capsule         Home medications and discharge medications reconciled with patient      Condition on Discharge: Stable    Discharge  Disposition  Home    Visiting Nurse:    No     Home PT/OT:  No     Home Safety Evaluation:  No     DME  None    Discharge Diet: Regular       Activity at Discharge:  As tolerated      Follow-up Appointments:  Additional Instructions for the Follow-ups that You Need to Schedule     Call MD for problems / concerns.   As directed      Discharge Follow-up with PCP   As directed       Currently Documented PCP:    Renata Gong MD    PCP Phone Number:    [phone]     Follow Up Details: one week            Follow-up Information     Renata Gong MD .    Specialty: Family Medicine  Why: one week  Contact information:  [address]  [address]  [place] [address]  [phone]                         Test Results Pending at Discharge       Renata Gong MD  02/11/23  11:14 EST          Much of this encounter note is an electronic transcription/translation of spoken language to printed text. The electronic translation of spoken language may permit erroneous, or at times, nonsensical words or phrases to be inadvertently transcribed; Although I have reviewed the note for such errors, some may still exist.    Note Disclaimer: At [facility], we believe that sharing information builds trust and better relationships. You are receiving this note because you recently visited [facility]. It is possible you will see health information before a provider has talked with you about it. This kind of information can be easy to misunderstand. To help you fully understand what it means for your health, we urge you to discuss this note with your provider.

## 2023-02-11 NOTE — OUTREACH NOTE
Prep Survey    Flowsheet Row Responses   [facility] facility patient discharged from? [place]   Is LACE score < 7 ? No   Eligibility Readm Mgmt   Discharge diagnosis GI bleed   Does the patient have one of the following disease processes/diagnoses(primary or secondary)? Other   Does the patient have Home health ordered? No   Is there a DME ordered? No   Prep survey completed? Yes          Traci ADDISON - Registered Nurse

## 2023-02-14 ENCOUNTER — READMISSION MANAGEMENT (OUTPATIENT)
Dept: CALL CENTER | Facility: HOSPITAL | Age: 63
End: 2023-02-14
Payer: COMMERCIAL

## 2023-02-14 NOTE — OUTREACH NOTE
Medical Week 1 Survey    Flowsheet Row Responses   [facility] facility patient discharged from? [place]   Does the patient have one of the following disease processes/diagnoses(primary or secondary)? Other   Week 1 attempt successful? No   Unsuccessful attempts Attempt 1          Gloria Bergeron Registered Nurse

## 2023-02-21 ENCOUNTER — READMISSION MANAGEMENT (OUTPATIENT)
Dept: CALL CENTER | Facility: HOSPITAL | Age: 63
End: 2023-02-21
Payer: COMMERCIAL

## 2023-02-21 NOTE — OUTREACH NOTE
Medical Week 2 Survey    Flowsheet Row Responses   [facility] patient discharged from? [place]   Does the patient have one of the following disease processes/diagnoses(primary or secondary)? Other   Week 2 attempt successful? Yes   Call start time 1245   Discharge diagnosis GI bleed   Call end time 1246   Meds reviewed with patient/caregiver? Yes   Is the patient having any side effects they believe may be caused by any medication additions or changes? No   Does the patient have all medications ordered at discharge? Yes   Is the patient taking all medications as directed (includes completed medication regime)? Yes   Does the patient have a primary care provider?  Yes   Comments regarding PCP PATIENT HAS SEEN HER PCP   Has the patient kept scheduled appointments due by today? Yes   Has home health visited the patient within 72 hours of discharge? N/A   Psychosocial issues? No   Did the patient receive a copy of their discharge instructions? Yes   Nursing interventions Reviewed instructions with patient   What is the patient's perception of their health status since discharge? Improving   Is the patient/caregiver able to teach back signs and symptoms related to disease process for when to call PCP? Yes   Is the patient/caregiver able to teach back signs and symptoms related to disease process for when to call 911? Yes   Is the patient/caregiver able to teach back the hierarchy of who to call/visit for symptoms/problems? PCP, Specialist, Home health nurse, Urgent Care, ED, 911 Yes   If the patient is a current smoker, are they able to teach back resources for cessation? Not a smoker   Week 2 Call Completed? Yes   Graduated Yes   Is the patient interested in additional calls from an ambulatory ?  NOTE:  applies to high risk patients requiring additional follow-up. No   Did the patient feel the follow up calls were helpful during their recovery period? Yes   Was the number of calls appropriate? Yes    Graduated/Revoked comments PATIENT STATES SHE IS DOING WELL AND NO NEED FOR FUTURE CALLS          Emily ADDISON - Licensed Nurse

## 2023-03-13 ENCOUNTER — TELEPHONE (OUTPATIENT)
Dept: ORTHOPEDIC SURGERY | Facility: CLINIC | Age: 63
End: 2023-03-13

## 2023-03-13 NOTE — TELEPHONE ENCOUNTER
Caller: Pamela Mack    Relationship to patient: Self    Best call back number: [phone]    Chief complaint: RT KNEE     Type of visit: INJECTION    Requested date: NEXT AVAILABLE LATEST OF THE DAY

## 2023-03-21 ENCOUNTER — TRANSCRIBE ORDERS (OUTPATIENT)
Dept: BONE DENSITY | Facility: HOSPITAL | Age: 63
End: 2023-03-21
Payer: COMMERCIAL

## 2023-03-21 DIAGNOSIS — Z78.0 MENOPAUSE: Primary | ICD-10-CM

## 2023-04-07 ENCOUNTER — APPOINTMENT (OUTPATIENT)
Dept: BONE DENSITY | Facility: HOSPITAL | Age: 63
End: 2023-04-07
Payer: COMMERCIAL

## 2023-04-07 DIAGNOSIS — Z78.0 MENOPAUSE: ICD-10-CM

## 2023-04-07 PROCEDURE — 77080 DXA BONE DENSITY AXIAL: CPT

## 2023-04-17 ENCOUNTER — OFFICE VISIT (OUTPATIENT)
Dept: ORTHOPEDIC SURGERY | Facility: CLINIC | Age: 63
End: 2023-04-17
Payer: COMMERCIAL

## 2023-04-17 VITALS — WEIGHT: 215 LBS | BODY MASS INDEX: 34.55 KG/M2 | HEIGHT: 66 IN

## 2023-04-17 DIAGNOSIS — M17.11 PRIMARY OSTEOARTHRITIS OF RIGHT KNEE: ICD-10-CM

## 2023-04-17 DIAGNOSIS — M94.262 CHONDROMALACIA OF KNEE, LEFT: ICD-10-CM

## 2023-04-17 DIAGNOSIS — M94.261 CHONDROMALACIA OF KNEE, RIGHT: Primary | ICD-10-CM

## 2023-04-17 RX ORDER — LIDOCAINE HYDROCHLORIDE 10 MG/ML
8 INJECTION, SOLUTION EPIDURAL; INFILTRATION; INTRACAUDAL; PERINEURAL
Status: COMPLETED | OUTPATIENT
Start: 2023-04-17 | End: 2023-04-17

## 2023-04-17 RX ORDER — TRIAMCINOLONE ACETONIDE 40 MG/ML
80 INJECTION, SUSPENSION INTRA-ARTICULAR; INTRAMUSCULAR
Status: COMPLETED | OUTPATIENT
Start: 2023-04-17 | End: 2023-04-17

## 2023-04-17 RX ADMIN — TRIAMCINOLONE ACETONIDE 80 MG: 40 INJECTION, SUSPENSION INTRA-ARTICULAR; INTRAMUSCULAR at 16:31

## 2023-04-17 RX ADMIN — LIDOCAINE HYDROCHLORIDE 8 ML: 10 INJECTION, SOLUTION EPIDURAL; INFILTRATION; INTRACAUDAL; PERINEURAL at 16:31

## 2023-04-17 NOTE — PROGRESS NOTES
Subjective:     Patient ID: Pamela Mack is a 62 y.o. female.    Chief Complaint:  Follow-up bilateral knee pain, chondromalacia, DJD  Last injection-bilateral knee-7/18/2022-cortisone     History of Present Illness  Pamela Mack returns to the clinic for evaluation of bilateral knee pain, right worse than left. She complains of bilateral knee pain, right worse than left. She rates her pain 5 to 6 out of 10, with moderate in intensity. Her pain is exacerbated with prolonged ambulation, weightbearing, and deep flexion activities. She notes minimal improvement with rest. She denies any significant radiation, numbness, or tingling.    She experiences mild cramping after her last injections, approximately 1 to 2 weeks after. She felt like it may be due to her injection, but she was also pushing mowing her yard at 90 degree weather, so she feels like it may be due to that. Otherwise, she has done fairly well, but over the last several months, she has started to have increased numbness of pain along the medial and lateral retinaculum of her right knee.       Social History     Occupational History   • Not on file   Tobacco Use   • Smoking status: Former   • Smokeless tobacco: Never   Vaping Use   • Vaping Use: Never used   Substance and Sexual Activity   • Alcohol use: No   • Drug use: No   • Sexual activity: Not Currently     Birth control/protection: Post-menopausal      Past Medical History:   Diagnosis Date   • Anxiety    • Arthritis    • Colon polyp    • Fracture of wrist      Past Surgical History:   Procedure Laterality Date   • AVULSION TOENAIL PLATE Bilateral    • COLONOSCOPY     • COLONOSCOPY N/A 2/9/2023    Procedure: COLONOSCOPY;  Surgeon: Celso Kendrick MD;  Location: [facility];  Service: Gastroenterology;  Laterality: N/A;  diverticulosis   • GALLBLADDER SURGERY         Family History   Problem Relation Age of Onset   • Cancer Father    • Hypertension Brother    • Breast cancer Neg Hx    • Ovarian  "cancer Neg Hx    • Uterine cancer Neg Hx    • Colon cancer Neg Hx    • Deep vein thrombosis Neg Hx    • Pulmonary embolism Neg Hx          Review of Systems        Objective:  Vitals:    04/17/23 1544   Weight: 97.5 kg (215 lb)   Height: 167.6 cm (65.98\")         04/17/23  1544   Weight: 97.5 kg (215 lb)     Body mass index is 34.72 kg/m².  General: No acute distress.  Resp: normal respiratory effort  Skin: no rashes or wounds; normal turgor  Psych: mood and affect appropriate; recent and remote memory intact          Ortho Exam       Right Knee:     Range of motion is 3 to 125 degrees.   Flexion strength- 4+/5.   Extension strength- 4+/5.   Mild tenderness along the lateral joint line.   Effusion- Moderate.   Lachman- Grade 1A.   Anterior drawer- Negative.   Posterior drawer- Negative.   Stable opening on varus and valgus stress at 0 and 30 degrees.   Positive sensation light touch all distributions symmetric to contralateral side.   Brisk cap refill all digits, 2+ dorsalis pedis pulse.     Imaging:  None today  Assessment:        1. Chondromalacia of knee, right    2. Primary osteoarthritis of right knee    3. Chondromalacia of knee, left           Plan:  Large Joint Arthrocentesis: R knee  Date/Time: 4/17/2023 4:31 PM  Consent given by: patient  Site marked: site marked  Timeout: Immediately prior to procedure a time out was called to verify the correct patient, procedure, equipment, support staff and site/side marked as required   Supporting Documentation  Indications: pain   Procedure Details  Location: knee - R knee  Preparation: Patient was prepped and draped in the usual sterile fashion  Needle size: 22 G  Approach: superior  Medications administered: 8 mL lidocaine PF 1% 1 %; 80 mg triamcinolone acetonide 40 MG/ML  Patient tolerance: patient tolerated the procedure well with no immediate complications                1. Discussed treatment options at length with patient at today's visit.  2. Given her " predominance of pain on the right side and her concern for any side effects from bilateral injection, we discussed options. She would like to proceed with right knee injection today.  3. Patient would like to proceed with cortisone injection today to the right knee. Recommended limited use of affected extremity for the next 24 hours to only essential activites other than work on general active and passive motion. Recommended supplementing with ice and soft tissue massage. Discussed with patient that they should see results in 5-7 days, if no improvement in 5-6 weeks I have asked them to call the office to review other options. Patient should call office immediately if they notice redness, warmth, fevers, chills, or residual numbness or tingling for greater than 6 hours after injection.   4. She will follow-up in 2 to 4 weeks for injection of the left side for an injection only visit.      Pamela Mack was in agreement with plan and had all questions answered.     Orders:  Orders Placed This Encounter   Procedures   • Large Joint Arthrocentesis: R knee       Medications:  No orders of the defined types were placed in this encounter.      Followup:  Return in about 4 weeks (around 5/15/2023).    Diagnoses and all orders for this visit:    1. Chondromalacia of knee, right (Primary)    2. Primary osteoarthritis of right knee    3. Chondromalacia of knee, left    Other orders  -     Large Joint Arthrocentesis: R knee          Transcribed from ambient dictation for Zion Taylor MD by Kelli Tyler.  04/17/23   19:12 EDT

## 2023-05-08 ENCOUNTER — CLINICAL SUPPORT (OUTPATIENT)
Dept: ORTHOPEDIC SURGERY | Facility: CLINIC | Age: 63
End: 2023-05-08
Payer: COMMERCIAL

## 2023-05-08 VITALS — WEIGHT: 215 LBS | HEIGHT: 66 IN | BODY MASS INDEX: 34.55 KG/M2

## 2023-05-08 DIAGNOSIS — M17.11 PRIMARY OSTEOARTHRITIS OF RIGHT KNEE: Primary | ICD-10-CM

## 2023-05-08 DIAGNOSIS — M94.262 CHONDROMALACIA OF KNEE, LEFT: ICD-10-CM

## 2023-05-08 RX ORDER — TRIAMCINOLONE ACETONIDE 40 MG/ML
80 INJECTION, SUSPENSION INTRA-ARTICULAR; INTRAMUSCULAR
Status: COMPLETED | OUTPATIENT
Start: 2023-05-08 | End: 2023-05-08

## 2023-05-08 RX ORDER — LIDOCAINE HYDROCHLORIDE 10 MG/ML
8 INJECTION, SOLUTION EPIDURAL; INFILTRATION; INTRACAUDAL; PERINEURAL
Status: COMPLETED | OUTPATIENT
Start: 2023-05-08 | End: 2023-05-08

## 2023-05-08 RX ADMIN — TRIAMCINOLONE ACETONIDE 80 MG: 40 INJECTION, SUSPENSION INTRA-ARTICULAR; INTRAMUSCULAR at 15:49

## 2023-05-08 RX ADMIN — LIDOCAINE HYDROCHLORIDE 8 ML: 10 INJECTION, SOLUTION EPIDURAL; INFILTRATION; INTRACAUDAL; PERINEURAL at 15:49

## 2023-05-08 NOTE — PROGRESS NOTES
Large Joint Arthrocentesis: L knee  Date/Time: 5/8/2023 3:49 PM  Consent given by: patient  Site marked: site marked  Timeout: Immediately prior to procedure a time out was called to verify the correct patient, procedure, equipment, support staff and site/side marked as required   Supporting Documentation  Indications: pain   Procedure Details  Location: knee - L knee  Preparation: Patient was prepped and draped in the usual sterile fashion  Needle size: 22 G  Approach: anterolateral  Medications administered: 8 mL lidocaine PF 1% 1 %; 80 mg triamcinolone acetonide 40 MG/ML  Patient tolerance: patient tolerated the procedure well with no immediate complications        Cc: Left knee DJD    Patient presents to clinic today for left knee intra-articular cortisone injection(s). I explained details of injections as well as risks, benefits and alternatives with the patient today, had all questions answered, wished to proceed with injections. Patient was instructed to watch for signs or symptoms of infection including redness, swelling, warmth to the touch, or significant increased pain and to contact our office immediately if any of these issues were noted.

## 2023-05-18 ENCOUNTER — OFFICE VISIT (OUTPATIENT)
Dept: OBSTETRICS AND GYNECOLOGY | Facility: CLINIC | Age: 63
End: 2023-05-18
Payer: COMMERCIAL

## 2023-05-18 VITALS
BODY MASS INDEX: 34.07 KG/M2 | HEIGHT: 66 IN | DIASTOLIC BLOOD PRESSURE: 90 MMHG | WEIGHT: 212 LBS | SYSTOLIC BLOOD PRESSURE: 140 MMHG

## 2023-05-18 DIAGNOSIS — Z11.51 SPECIAL SCREENING EXAMINATION FOR HUMAN PAPILLOMAVIRUS (HPV): ICD-10-CM

## 2023-05-18 DIAGNOSIS — Z12.31 ENCOUNTER FOR SCREENING MAMMOGRAM FOR MALIGNANT NEOPLASM OF BREAST: ICD-10-CM

## 2023-05-18 DIAGNOSIS — Z01.419 PAP SMEAR, LOW-RISK: Primary | ICD-10-CM

## 2023-05-18 DIAGNOSIS — N36.2 URETHRAL CARUNCLE: ICD-10-CM

## 2023-05-18 DIAGNOSIS — N81.9 FEMALE GENITAL PROLAPSE, UNSPECIFIED TYPE: ICD-10-CM

## 2023-05-18 DIAGNOSIS — Z01.419 ROUTINE GYNECOLOGICAL EXAMINATION: ICD-10-CM

## 2023-05-18 LAB
BILIRUB BLD-MCNC: NEGATIVE MG/DL
CLARITY, POC: CLEAR
COLOR UR: YELLOW
GLUCOSE UR STRIP-MCNC: NEGATIVE MG/DL
KETONES UR QL: NEGATIVE
LEUKOCYTE EST, POC: NEGATIVE
NITRITE UR-MCNC: NEGATIVE MG/ML
PH UR: 5 [PH] (ref 5–8)
PROT UR STRIP-MCNC: NEGATIVE MG/DL
RBC # UR STRIP: NEGATIVE /UL
SP GR UR: 1 (ref 1–1.03)
UROBILINOGEN UR QL: NORMAL

## 2023-05-18 NOTE — PROGRESS NOTES
GYN Exam    CC- Here for annual     Pamela Frederick is a 62 y.o. female est pt who presents for annual exam. She says her prolapse is unchanged and as long as she avoid heavy lifting, she can tolerate her symptoms. She say urology and they said she had a kidney cyst that they will follow yearly.   OB History        1    Para   1    Term   1            AB        Living   1       SAB        IAB        Ectopic        Molar        Multiple        Live Births              Obstetric Comments   1  9.2 pound infant             Menarche:13  Menopause:ablation mid 40s  HRT:none  Current contraception: post menopausal status  History of abnormal Pap smear: no  History of abnormal mammogram: no  Family history of uterine, colon or ovarian cancer: no  Family history of breast cancer: no  STD's: none   Last pap smear 2022- nl pap/HPV  Gardasil: missed  MITZI: none   POP      Health Maintenance   Topic Date Due   • HEPATITIS C SCREENING  Never done   • ANNUAL PHYSICAL  Never done   • COVID-19 Vaccine (3 - Booster) 2021   • Annual Gynecologic Pelvic and Breast Exam  05/10/2023   • INFLUENZA VACCINE  2023   • MAMMOGRAM  2025   • PAP SMEAR  2025   • TDAP/TD VACCINES (2 - Td or Tdap) 2025   • COLORECTAL CANCER SCREENING  2028   • ZOSTER VACCINE  Completed   • Pneumococcal Vaccine 0-64  Aged Out       Past Medical History:   Diagnosis Date   • Anxiety    • Arthritis    • Colon polyp    • Fracture of wrist        Past Surgical History:   Procedure Laterality Date   • AVULSION TOENAIL PLATE Bilateral    • COLONOSCOPY     • COLONOSCOPY N/A 2023    Procedure: COLONOSCOPY;  Surgeon: Celso Kendrick MD;  Location: [facility];  Service: Gastroenterology;  Laterality: N/A;  diverticulosis   • GALLBLADDER SURGERY           Current Outpatient Medications:   •  ALPRAZolam (XANAX) 0.25 MG tablet, Xanax 0.25 mg tablet  Take 1 tablet every day by oral route as needed., Disp: , Rfl:   •   Calcium Carbonate-Vitamin D 600-200 MG-UNIT tablet, Every 12 (Twelve) Hours., Disp: , Rfl:   •  cetirizine (zyrTEC) 10 MG tablet, cetirizine 10 mg tablet  Take 1 tablet every day by oral route., Disp: , Rfl:   •  estradiol (ESTRACE) 0.1 MG/GM vaginal cream, Apply pea sized amount to urethra twice a week before bedtime, Disp: 45 g, Rfl: 2  •  fluticasone (FLONASE) 50 MCG/ACT nasal spray, fluticasone propionate 50 mcg/actuation nasal spray,suspension  1-2 sprays each nostril daily, Disp: , Rfl:   •  iron polysaccharides (IFerex 150) 150 MG capsule, Take 1 capsule by mouth Daily. (Patient not taking: Reported on 5/18/2023), Disp: 30 capsule, Rfl: 0    Allergies   Allergen Reactions   • Erythromycin Myalgia       Social History     Tobacco Use   • Smoking status: Former   • Smokeless tobacco: Never   Vaping Use   • Vaping Use: Never used   Substance Use Topics   • Alcohol use: No   • Drug use: No       Family History   Problem Relation Age of Onset   • Cancer Father    • Hypertension Brother    • Breast cancer Neg Hx    • Ovarian cancer Neg Hx    • Uterine cancer Neg Hx    • Colon cancer Neg Hx    • Deep vein thrombosis Neg Hx    • Pulmonary embolism Neg Hx        Review of Systems   Constitutional: Positive for activity change. Negative for appetite change, fatigue, fever and unexpected weight change.   Eyes: Negative for photophobia and visual disturbance.   Respiratory: Negative for cough and shortness of breath.    Cardiovascular: Negative for chest pain and palpitations.   Gastrointestinal: Negative for abdominal distention, abdominal pain, constipation, diarrhea and nausea.   Endocrine: Negative for cold intolerance and heat intolerance.   Genitourinary: Positive for vaginal pain (bulge). Negative for dyspareunia, dysuria, menstrual problem, pelvic pain, vaginal bleeding and vaginal discharge.   Musculoskeletal: Negative for back pain.   Skin: Negative for color change and rash.   Neurological: Negative for  "headaches.   Hematological: Negative for adenopathy. Does not bruise/bleed easily.   Psychiatric/Behavioral: Negative for dysphoric mood. The patient is not nervous/anxious.           /90   Ht 167.6 cm (66\")   Wt 96.2 kg (212 lb)   Breastfeeding No   BMI 34.22 kg/m²     Physical Exam  Vitals and nursing note reviewed. Exam conducted with a chaperone present.   Constitutional:       Appearance: Normal appearance. She is well-developed. She is obese.   HENT:      Head: Normocephalic and atraumatic.   Eyes:      General: No scleral icterus.     Conjunctiva/sclera: Conjunctivae normal.   Neck:      Thyroid: No thyromegaly.   Cardiovascular:      Rate and Rhythm: Normal rate and regular rhythm.   Pulmonary:      Breath sounds: Normal breath sounds.   Chest:   Breasts:     Right: No swelling, bleeding, inverted nipple, mass, nipple discharge, skin change or tenderness.      Left: No swelling, bleeding, inverted nipple, mass, nipple discharge, skin change or tenderness.   Abdominal:      General: There is no distension.      Palpations: Abdomen is soft. There is no mass.      Tenderness: There is no abdominal tenderness. There is no guarding or rebound.      Hernia: No hernia is present.   Genitourinary:     Labia:         Right: No rash, tenderness, lesion or injury.         Left: No rash, tenderness, lesion or injury.       Urethra: Prolapse present. No urethral pain, urethral swelling or urethral lesion.      Vagina: No signs of injury and foreign body. Prolapsed vaginal walls present. No vaginal discharge, erythema, tenderness, bleeding or lesions.      Cervix: No cervical motion tenderness, discharge, friability, lesion, erythema, cervical bleeding or eversion.      Uterus: Normal. With uterine prolapse.       Adnexa: Right adnexa normal.      Rectum: Normal.          Comments: Exam limited due to habitus  Grade 2-3 cystocele and rectocele  Musculoskeletal:      Cervical back: Neck supple.   Skin:     " General: Skin is warm and dry.   Neurological:      Mental Status: She is alert and oriented to person, place, and time.   Psychiatric:         Mood and Affect: Mood normal.         Behavior: Behavior normal.         Thought Content: Thought content normal.         Judgment: Judgment normal.            Assessment/Plan  1) POP- pt has grade 2-3 cystocele and rectocele on exam.  We again discussed treatment options and she declines all.   2) GYN HM: normal  pap/HPV 5/2022. SBE demonstrated and encouraged.  3) STD screening: declines Condoms encouraged.  4) Bone health - Weight bearing exercise, dietary calcium recommendations and vitamin D reviewed.   5) Diet and Exercise discussed  6) Smoking Status: No  7) Social: no issues  8)MMG: UTD 2/2023 B2. Schedule MMG 2/2024  9) DEXA-UTD 4/2023- normal BMD, repeat in 3-5 years  10)C scope-UTD 2/2023- repeat 7 years   11) H/O hematuria-.pt has seen urology and will continue to see them yearly  12) Urethral caruncle-  Rx for Estrace, pea-sized amount to urethra twice a week before bedtime.Patient counseled that vaginal estrogen rings, creams and tablets are available and highly effective at treating local vaginal symptoms such as atrophy and vaginal dryness.  Vaginal estrogen does not cause uterine overgrowth and does not require a progestogen to protect the uterus.  Very small amounts of estrogen are absorbed systemically.  For patients with a history of an estrogen dependent cancer such as breast cancer, the decision to use local estrogen for local vaginal symptoms should be made after consultation with her oncologist.  Possible side effects include local irritation or burning and/or vaginal bleeding and should always be reported.    13)Parts of this document have been copied or forwarded from her previous visits and have been reviewed, updated and edited as indicated.   14) RTO 1 year annual and/or prn        Diagnoses and all orders for this visit:    1. Pap smear,  low-risk (Primary)  -     POC Urinalysis Dipstick  -     Cancel: IGP, Apt HPV,rfx 16 / 18,45    2. Routine gynecological examination  -     POC Urinalysis Dipstick  -     Cancel: IGP, Apt HPV,rfx 16 / 18,45    3. Special screening examination for human papillomavirus (HPV)  -     POC Urinalysis Dipstick  -     Cancel: IGP, Apt HPV,rfx 16 / 18,45    4. Encounter for screening mammogram for malignant neoplasm of breast  -     Mammo Screening Digital Tomosynthesis Bilateral With CAD; Future    5. Urethral caruncle    6. Female genital prolapse, unspecified type        Sandi Baker MD  5/18/2023        12:11 EDT

## 2023-05-19 RX ORDER — ESTRADIOL 0.1 MG/G
CREAM VAGINAL
Qty: 45 G | Refills: 2 | Status: SHIPPED | OUTPATIENT
Start: 2023-05-19

## 2023-08-16 ENCOUNTER — TELEPHONE (OUTPATIENT)
Dept: ORTHOPEDIC SURGERY | Facility: CLINIC | Age: 63
End: 2023-08-16

## 2023-08-16 NOTE — TELEPHONE ENCOUNTER
Caller: Pamela Mack    Relationship to patient: Self    Best call back number: [phone]    Patient is needing: SCHEDULE LEFT KNEE INJECTION AS LATE IN THE PM AS POSSIBLE

## 2023-09-07 ENCOUNTER — HOSPITAL ENCOUNTER (OUTPATIENT)
Dept: ULTRASOUND IMAGING | Facility: HOSPITAL | Age: 63
Discharge: HOME | End: 2023-09-07
Admitting: UROLOGY
Payer: COMMERCIAL

## 2023-09-07 DIAGNOSIS — R31.21 ASYMPTOMATIC MICROSCOPIC HEMATURIA: ICD-10-CM

## 2023-09-07 PROCEDURE — 76775 US EXAM ABDO BACK WALL LIM: CPT

## 2023-09-18 ENCOUNTER — OFFICE VISIT (OUTPATIENT)
Dept: ORTHOPEDIC SURGERY | Facility: CLINIC | Age: 63
End: 2023-09-18
Payer: COMMERCIAL

## 2023-09-18 VITALS — WEIGHT: 216.6 LBS | HEIGHT: 66 IN | BODY MASS INDEX: 34.81 KG/M2

## 2023-09-18 DIAGNOSIS — M94.261 CHONDROMALACIA OF KNEE, RIGHT: ICD-10-CM

## 2023-09-18 DIAGNOSIS — M94.262 CHONDROMALACIA OF KNEE, LEFT: ICD-10-CM

## 2023-09-18 DIAGNOSIS — M17.11 PRIMARY OSTEOARTHRITIS OF RIGHT KNEE: Primary | ICD-10-CM

## 2023-09-18 RX ADMIN — TRIAMCINOLONE ACETONIDE 80 MG: 40 INJECTION, SUSPENSION INTRA-ARTICULAR; INTRAMUSCULAR at 15:51

## 2023-09-18 RX ADMIN — LIDOCAINE HYDROCHLORIDE 8 ML: 10 INJECTION, SOLUTION EPIDURAL; INFILTRATION; INTRACAUDAL; PERINEURAL at 15:51

## 2023-09-18 NOTE — PROGRESS NOTES
Subjective:     Patient ID: Pamela Mack is a 63 y.o. female.    Chief Complaint:  Follow-up bilateral knee pain, DJD  Last injection-left knee-5/8/2023  History of Present Illness  Pamela Mack returns to clinic today for follow-up evaluation of bilateral knee pain.    The patient reports that her right knee is primarily the issue for her at this point in time. She rates her pain as moderate to severe in intensity, aching in nature, localized primarily to the anterolateral aspect of her right knee, worse with prolonged walking, weightbearing, especially with push mowing her yard, which she did over the weekend. She does note some moderate swelling. It does wax and wane. She denies any cass locking or catching. She denies any numbness or tingling. Mild radiation of pain on the lateral aspect of the right leg. She does have some moderate pain over her left knee, but it is not as bad at this point in time. She wants to hold off on doing both injections at the same time.     Social History     Occupational History    Not on file   Tobacco Use    Smoking status: Former    Smokeless tobacco: Never   Vaping Use    Vaping Use: Never used   Substance and Sexual Activity    Alcohol use: No    Drug use: No    Sexual activity: Not Currently     Birth control/protection: Post-menopausal      Past Medical History:   Diagnosis Date    Anxiety     Arthritis     Colon polyp     Fracture of wrist      Past Surgical History:   Procedure Laterality Date    AVULSION TOENAIL PLATE Bilateral     COLONOSCOPY      COLONOSCOPY N/A 2/9/2023    Procedure: COLONOSCOPY;  Surgeon: Celso Kendrick MD;  Location: [facility];  Service: Gastroenterology;  Laterality: N/A;  diverticulosis    GALLBLADDER SURGERY         Family History   Problem Relation Age of Onset    Cancer Father     Hypertension Brother     Breast cancer Neg Hx     Ovarian cancer Neg Hx     Uterine cancer Neg Hx     Colon cancer Neg Hx     Deep vein thrombosis Neg Hx      "Pulmonary embolism Neg Hx          Review of Systems        Objective:  Vitals:    09/18/23 1508   Weight: 98.2 kg (216 lb 9.6 oz)   Height: 167.6 cm (66\")         09/18/23  1508   Weight: 98.2 kg (216 lb 9.6 oz)     Body mass index is 34.96 kg/m².  General: No acute distress.  Resp: normal respiratory effort  Skin: no rashes or wounds; normal turgor  Psych: mood and affect appropriate; recent and remote memory intact        Ortho Exam       Right knee-    ROM 0 to 125 degrees  4+/5 on flexion  4+/5 on extension  Maximal tenderness to palpation over the medial and lateral patellar facet.  Moderate tenderness over the lateral joint line.  Liu's exam- Positive with pain, no click.  Effusion- Moderate  Grade 1A Lachman  Anterior drawer- Negative  Posterior drawer- Negative  Stable opening on varus and valgus stress at 0 and 30 degrees.  Valgus alignment noted.   Active patellar compression test- Positive    Imaging:  Bilateral Knee X-Ray  Indication: Pain    AP, Lateral, and Murray views    Findings:  No fracture  No bony lesion  Normal soft tissues  Mild lateral with moderate patellofemoral compartment joint space narrowing and reactive osteophyte formation noted no significantly on patella axial view.  Slight valgus alignment noted  Compared to prior office x-rays    Assessment:        1. Primary osteoarthritis of right knee    2. Chondromalacia of knee, left    3. Chondromalacia of knee, right           Plan:      Large Joint Arthrocentesis: R knee  Date/Time: 9/18/2023 3:51 PM  Consent given by: patient  Site marked: site marked  Timeout: Immediately prior to procedure a time out was called to verify the correct patient, procedure, equipment, support staff and site/side marked as required   Supporting Documentation  Indications: pain   Procedure Details  Location: knee - R knee  Preparation: Patient was prepped and draped in the usual sterile fashion  Needle size: 22 G  Approach: anterolateral  Medications " administered: 8 mL lidocaine PF 1% 1 %; 80 mg triamcinolone acetonide 40 MG/ML  Patient tolerance: patient tolerated the procedure well with no immediate complications        1. I discussed treatment options at length with patient at today's visit. Given prior success, she would like to proceed with injection to the right knee today.  2. The patient would like to proceed with cortisone injection today to the right knee. I recommended limited use of affected extremity for the next 24 hours to only essential activities other than work on general active and passive motion. I recommended supplementing with ice and soft tissue massage. I discussed with patient that they should see results in 5-7 days, if no improvement in 5-6 weeks I have asked them to call the office to review other options. The patient should call office immediately if they notice redness, warmth, fevers, chills, or residual numbness or tingling for greater than 6 hours after injection.  3. She will continue with hip, core, and quad strengthening exercises.  4. She will follow up in 3 weeks for injection only visit for the left knee.  5. If she continues to have pain into her right hip, she will contact us and get an appointment set up with Dr. Oconnor for further evaluation.        Pamela Mack was in agreement with plan and had all questions answered.     Orders:  Orders Placed This Encounter   Procedures    Large Joint Arthrocentesis: R knee    XR Knee 3 View Bilateral       Medications:  No orders of the defined types were placed in this encounter.      Followup:  Return in about 3 weeks (around 10/9/2023).    Diagnoses and all orders for this visit:    1. Primary osteoarthritis of right knee (Primary)  -     XR Knee 3 View Bilateral  -     Large Joint Arthrocentesis: R knee    2. Chondromalacia of knee, left  -     XR Knee 3 View Bilateral    3. Chondromalacia of knee, right  -     XR Knee 3 View Bilateral          Dictated utilizing Dragon  dictation     Transcribed from ambient dictation for Zion Taylor MD by Naina Clarke.  09/18/23   16:25 EDT    Patient or patient representative verbalized consent to the visit recording.  I have personally performed the services described in this document as transcribed by the above individual, and it is both accurate and complete.

## 2023-10-01 RX ORDER — LIDOCAINE HYDROCHLORIDE 10 MG/ML
8 INJECTION, SOLUTION EPIDURAL; INFILTRATION; INTRACAUDAL; PERINEURAL
Status: COMPLETED | OUTPATIENT
Start: 2023-09-18 | End: 2023-09-18

## 2023-10-01 RX ORDER — TRIAMCINOLONE ACETONIDE 40 MG/ML
80 INJECTION, SUSPENSION INTRA-ARTICULAR; INTRAMUSCULAR
Status: COMPLETED | OUTPATIENT
Start: 2023-09-18 | End: 2023-09-18

## 2023-10-09 ENCOUNTER — CLINICAL SUPPORT (OUTPATIENT)
Dept: ORTHOPEDIC SURGERY | Facility: CLINIC | Age: 63
End: 2023-10-09
Payer: COMMERCIAL

## 2023-10-09 VITALS — WEIGHT: 216 LBS | HEIGHT: 66 IN | BODY MASS INDEX: 34.72 KG/M2

## 2023-10-09 DIAGNOSIS — M94.262 CHONDROMALACIA OF KNEE, LEFT: Primary | ICD-10-CM

## 2023-10-09 PROCEDURE — 20610 DRAIN/INJ JOINT/BURSA W/O US: CPT | Performed by: ORTHOPAEDIC SURGERY

## 2023-10-09 RX ORDER — LIDOCAINE HYDROCHLORIDE 10 MG/ML
8 INJECTION, SOLUTION EPIDURAL; INFILTRATION; INTRACAUDAL; PERINEURAL
Status: COMPLETED | OUTPATIENT
Start: 2023-10-09 | End: 2023-10-09

## 2023-10-09 RX ORDER — TRIAMCINOLONE ACETONIDE 40 MG/ML
80 INJECTION, SUSPENSION INTRA-ARTICULAR; INTRAMUSCULAR
Status: COMPLETED | OUTPATIENT
Start: 2023-10-09 | End: 2023-10-09

## 2023-10-09 RX ADMIN — TRIAMCINOLONE ACETONIDE 80 MG: 40 INJECTION, SUSPENSION INTRA-ARTICULAR; INTRAMUSCULAR at 15:45

## 2023-10-09 RX ADMIN — LIDOCAINE HYDROCHLORIDE 8 ML: 10 INJECTION, SOLUTION EPIDURAL; INFILTRATION; INTRACAUDAL; PERINEURAL at 15:45

## 2023-10-09 NOTE — PROGRESS NOTES
Large Joint Arthrocentesis: L knee  Date/Time: 10/9/2023 3:45 PM  Consent given by: patient  Site marked: site marked  Timeout: Immediately prior to procedure a time out was called to verify the correct patient, procedure, equipment, support staff and site/side marked as required   Supporting Documentation  Indications: pain   Procedure Details  Location: knee - L knee  Preparation: Patient was prepped and draped in the usual sterile fashion  Needle size: 22 G  Approach: anterolateral  Medications administered: 8 mL lidocaine PF 1% 1 %; 80 mg triamcinolone acetonide 40 MG/ML  Patient tolerance: patient tolerated the procedure well with no immediate complications        Cc: Left knee DJD    Patient presents to clinic today for left knee intra-articular cortisone injection(s). I explained details of injections as well as risks, benefits and alternatives with the patient today, had all questions answered, wished to proceed with injections. Patient was instructed to watch for signs or symptoms of infection including redness, swelling, warmth to the touch, or significant increased pain and to contact our office immediately if any of these issues were noted.

## 2023-10-30 ENCOUNTER — TRANSCRIBE ORDERS (OUTPATIENT)
Dept: ADMINISTRATIVE | Facility: HOSPITAL | Age: 63
End: 2023-10-30
Payer: COMMERCIAL

## 2023-10-30 DIAGNOSIS — R31.9 HEMATURIA, UNSPECIFIED TYPE: Primary | ICD-10-CM

## 2023-11-14 ENCOUNTER — TELEPHONE (OUTPATIENT)
Dept: ORTHOPEDIC SURGERY | Facility: CLINIC | Age: 63
End: 2023-11-14
Payer: COMMERCIAL

## 2023-11-14 RX ORDER — MELOXICAM 15 MG/1
15 TABLET ORAL DAILY
Qty: 30 TABLET | Refills: 0 | Status: SHIPPED | OUTPATIENT
Start: 2023-11-14

## 2023-11-17 ENCOUNTER — LAB REQUISITION (OUTPATIENT)
Dept: LAB | Facility: HOSPITAL | Age: 63
End: 2023-11-17
Payer: COMMERCIAL

## 2023-11-17 ENCOUNTER — OFFICE VISIT (OUTPATIENT)
Dept: SURGERY | Facility: CLINIC | Age: 63
End: 2023-11-17
Payer: COMMERCIAL

## 2023-11-17 VITALS
HEIGHT: 66 IN | WEIGHT: 215.4 LBS | SYSTOLIC BLOOD PRESSURE: 144 MMHG | DIASTOLIC BLOOD PRESSURE: 98 MMHG | BODY MASS INDEX: 34.62 KG/M2

## 2023-11-17 DIAGNOSIS — D04.62 CARCINOMA IN SITU OF SKIN OF LEFT UPPER LIMB, INCLUDING SHOULDER: ICD-10-CM

## 2023-11-17 DIAGNOSIS — D04.62 SQUAMOUS CELL CARCINOMA IN SITU (SCCIS) OF SKIN OF LEFT FOREARM: Primary | ICD-10-CM

## 2023-11-17 PROCEDURE — 88305 TISSUE EXAM BY PATHOLOGIST: CPT | Performed by: SURGERY

## 2023-11-19 PROBLEM — E66.9 OBESITY (BMI 30.0-34.9): Status: ACTIVE | Noted: 2023-11-19

## 2023-11-19 PROBLEM — E66.811 OBESITY (BMI 30.0-34.9): Status: ACTIVE | Noted: 2023-11-19

## 2023-11-19 NOTE — PROGRESS NOTES
Office procedure    Preoperative diagnosis: Squamous cell carcinoma dorsal left forearm    Postoperative diagnosis: Same    Procedure: 2 x 6 cm elliptical excision of squamous cell carcinoma dorsal left forearm    Surgeon: Moe    Anesthesia: 1% lidocaine with epinephrine    Specimen: Skin lesion    Blood loss: Minimal    Description: In seated position with arm on table, prepped and draped usual sterile manner.  2 x 6 cm elliptical incision marked to ensure minimum of 5 mm grossly negative margins.  1% lidocaine with epinephrine infiltrated locally.  2 x 6 cm incision made with a knife and lesion removed down to the superficial muscular fascia with the knife.  Hemostasis achieved with the Hyfrecator.  Skin closed with 3-0 Vicryl deep dermal suture followed by running 4-0 nylon simple suture and sterile dressing.  Wound care instructions given.  Follow-up in 2 weeks for suture removal.    BMI is >= 30 and <35. (Class 1 Obesity). The following options were offered after discussion;: referral to primary care

## 2023-11-24 LAB
LAB AP CASE REPORT: NORMAL
PATH REPORT.FINAL DX SPEC: NORMAL
PATH REPORT.GROSS SPEC: NORMAL

## 2023-11-27 ENCOUNTER — TELEPHONE (OUTPATIENT)
Dept: SURGERY | Facility: CLINIC | Age: 63
End: 2023-11-27
Payer: COMMERCIAL

## 2023-11-27 NOTE — TELEPHONE ENCOUNTER
----- Message from Fahad Rosa MD sent at 11/27/2023  2:06 PM EST -----  Please let her know that her pathology report showed no residual skin cancer

## 2023-11-30 ENCOUNTER — OFFICE VISIT (OUTPATIENT)
Dept: SURGERY | Facility: CLINIC | Age: 63
End: 2023-11-30
Payer: COMMERCIAL

## 2023-11-30 DIAGNOSIS — Z48.89 POSTOPERATIVE VISIT: Primary | ICD-10-CM

## 2023-11-30 PROCEDURE — 99024 POSTOP FOLLOW-UP VISIT: CPT | Performed by: SURGERY

## 2023-11-30 NOTE — PROGRESS NOTES
CHIEF COMPLAINT: The patient presents for Office Visit    HISTORY OF PRESENT ILLNESS: Lucy Varner is here today for dry eyes OU. Lucy said she has had dry eyes for years. Lucy said she has been on dialysis for 10 years and she thinks her dry eyes started then. Lucy said her eyes burn, are teary and itchy and sometimes her vision is blurry. Lucy has been using Refresh 6x a day OU for treatment. She said she gets temporary relief from the drops, but it's not solving her dryness issue. Lucy was using prednisolone but it was discontinued because it wasn't helping.      FOH:     POH: Dry eye OU, pseudophakia OU, mild NPDR    OCULAR MEDICATION: Refresh 6x a day OU    Tech notes reviewed.    ASSESSMENT/PLAN:   1. Dry eye syndrome of both eyes  - Start/ Restart Restasis 0.05 % ophthalmic emulsion; Place 1 drop into both eyes in the morning and 1 drop in the evening.  - start hot compresses with adrianne mask - 10min 2 x day  - no relief with smart plugs or eagle plugs   - patient states using prednisolone drops, but no history of other dry eye medication   - preservative free refresh drops 4 x day to both eyes  - no relief with prednisolone   - return in 1 month for dry eye f/u   - discussed autologous blood serum drops in detail    Patient's assessment and plan discussed in detail with patient.  All questions answered.    RETURN TO CLINIC:  Return in about 4 weeks (around 4/27/2023) for Dry Eye Check. Return immediately if pain, redness, or decreased vision occur.             Postoperative visit    Incision is healed well.  Sutures removed and Steri-Strips applied.    Pathology was negative for residual carcinoma.

## 2023-12-18 ENCOUNTER — TELEPHONE (OUTPATIENT)
Dept: GASTROENTEROLOGY | Facility: CLINIC | Age: 63
End: 2023-12-18
Payer: COMMERCIAL

## 2023-12-18 NOTE — TELEPHONE ENCOUNTER
Caller: Pamela Mack    Relationship to patient: Self    Best call back number: [phone]     Patient is needing: PATIENT RECEIVED RECALL LETTER FOR REPEAT CLS. SHE ALREADY HAD ONE PERFORMED WHILE IN THE HOSPITAL, 2/9/23. PER THE PROCEDURE REPORT, SHE IS DUE FOR A REPEAT SCOPE IN 7 YRS.

## 2023-12-20 ENCOUNTER — OFFICE VISIT (OUTPATIENT)
Dept: ORTHOPEDIC SURGERY | Facility: CLINIC | Age: 63
End: 2023-12-20
Payer: COMMERCIAL

## 2023-12-20 VITALS — HEIGHT: 66 IN | WEIGHT: 215 LBS | BODY MASS INDEX: 34.55 KG/M2

## 2023-12-20 DIAGNOSIS — M17.11 PRIMARY OSTEOARTHRITIS OF RIGHT KNEE: Primary | ICD-10-CM

## 2023-12-20 RX ADMIN — TRIAMCINOLONE ACETONIDE 80 MG: 40 INJECTION, SUSPENSION INTRA-ARTICULAR; INTRAMUSCULAR at 15:15

## 2023-12-20 RX ADMIN — LIDOCAINE HYDROCHLORIDE 8 ML: 10 INJECTION, SOLUTION EPIDURAL; INFILTRATION; INTRACAUDAL; PERINEURAL at 15:15

## 2023-12-20 NOTE — PROGRESS NOTES
"Subjective:     Patient ID: Pamela Mack is a 63 y.o. female.    Chief Complaint:  Follow-up right knee pain, DJD  History of Present Illness  Pamela Mack presents to clinic today for evaluation of right knee pain, has been particular worse over the last 2 to 3 weeks, describes pain as aching in nature, moderate intensity, occasionally does get to a severe level.  Mild swelling that does wax and wane.  Localizes pain primarily to the medial and anterior aspect of the right knee, mild radiation down the leg but not below the level of the mid tibia.  Denies any cass locking or catching.  Denies any associated hip or groin pain.  Exacerbated with prolonged walking weightbearing and deep flexion activities especially stair climbing.     Social History     Occupational History    Not on file   Tobacco Use    Smoking status: Former     Passive exposure: Past    Smokeless tobacco: Never   Vaping Use    Vaping Use: Never used   Substance and Sexual Activity    Alcohol use: No    Drug use: No    Sexual activity: Not Currently     Birth control/protection: Post-menopausal      Past Medical History:   Diagnosis Date    Anxiety     Arthritis     Colon polyp     Fracture of wrist      Past Surgical History:   Procedure Laterality Date    AVULSION TOENAIL PLATE Bilateral     COLONOSCOPY      COLONOSCOPY N/A 2/9/2023    Procedure: COLONOSCOPY;  Surgeon: Celso Kendrick MD;  Location: [facility];  Service: Gastroenterology;  Laterality: N/A;  diverticulosis    GALLBLADDER SURGERY         Family History   Problem Relation Age of Onset    Cancer Father     Hypertension Brother     Breast cancer Neg Hx     Ovarian cancer Neg Hx     Uterine cancer Neg Hx     Colon cancer Neg Hx     Deep vein thrombosis Neg Hx     Pulmonary embolism Neg Hx          Review of Systems        Objective:  Vitals:    12/20/23 1508   Weight: 97.5 kg (215 lb)   Height: 167.6 cm (66\")         12/20/23  1508   Weight: 97.5 kg (215 lb)     Body mass " index is 34.7 kg/m².  Physical Exam    Vital signs reviewed.   General: No acute distress, alert and oriented  Eyes: conjunctiva clear; pupils equally round and reactive  ENT: external ears and nose atraumatic; oropharynx clear  CV: no peripheral edema  Resp: normal respiratory effort  Skin: no rashes or wounds; normal turgor  Psych: mood and affect appropriate; recent and remote memory intact        Ortho Exam     Right knee-active range of motion 2 to 130 degrees, 4+ out of 5 strength on flexion extension, moderate effusion, maximal tenderness palpation medial joint line with mildly positive Liu exam with pain, no click, mildly positive active patella compression test. Stable to varus valgus stress at 2 and 30 degrees.    Imaging:  None today  Assessment:        1. Primary osteoarthritis of right knee           Plan:  Large Joint Arthrocentesis: R knee  Date/Time: 12/20/2023 3:15 PM  Consent given by: patient  Site marked: site marked  Timeout: Immediately prior to procedure a time out was called to verify the correct patient, procedure, equipment, support staff and site/side marked as required   Supporting Documentation  Indications: pain   Procedure Details  Location: knee - R knee  Preparation: Patient was prepped and draped in the usual sterile fashion  Needle size: 22 G  Approach: anterolateral  Medications administered: 80 mg triamcinolone acetonide 40 MG/ML; 8 mL lidocaine PF 1% 1 %  Patient tolerance: patient tolerated the procedure well with no immediate complications                Discussed treatment options at length with patient at today's visit.  Reviewed options at length with patient at this point in time she would like to proceed with injection to the right knee.  She was to hold off on any advanced imaging or further surgical considerations.  Patient would like to proceed with cortisone injection today to the right knee. Recommended limited use of affected extremity for the next 24 hours to  only essential activites other than work on general active and passive motion. Recommended supplementing with ice and soft tissue massage. Discussed with patient that they should see results in 5-7 days, if no improvement in 5-6 weeks I have asked them to call the office to review other options. Patient should call office immediately if they notice redness, warmth, fevers, chills, or residual numbness or tingling for greater than 6 hours after injection.   Recommended hip core and quad strengthening exercises for support of the knee  Follow up: 3 months or sooner if needed      Pamela Mack was in agreement with plan and had all questions answered.     Orders:  Orders Placed This Encounter   Procedures    Large Joint Arthrocentesis: R knee       Medications:  No orders of the defined types were placed in this encounter.      Followup:  No follow-ups on file.    Diagnoses and all orders for this visit:    1. Primary osteoarthritis of right knee (Primary)    Other orders  -     Large Joint Arthrocentesis: R knee          Dictated utilizing Dragon dictation

## 2024-01-12 RX ORDER — TRIAMCINOLONE ACETONIDE 40 MG/ML
80 INJECTION, SUSPENSION INTRA-ARTICULAR; INTRAMUSCULAR
Status: COMPLETED | OUTPATIENT
Start: 2023-12-20 | End: 2023-12-20

## 2024-01-12 RX ORDER — LIDOCAINE HYDROCHLORIDE 10 MG/ML
8 INJECTION, SOLUTION EPIDURAL; INFILTRATION; INTRACAUDAL; PERINEURAL
Status: COMPLETED | OUTPATIENT
Start: 2023-12-20 | End: 2023-12-20

## 2024-01-17 ENCOUNTER — OFFICE VISIT (OUTPATIENT)
Dept: ORTHOPEDIC SURGERY | Facility: CLINIC | Age: 64
End: 2024-01-17
Payer: COMMERCIAL

## 2024-01-17 VITALS — WEIGHT: 215 LBS | HEIGHT: 66 IN | BODY MASS INDEX: 34.55 KG/M2

## 2024-01-17 DIAGNOSIS — M94.262 CHONDROMALACIA OF KNEE, LEFT: Primary | ICD-10-CM

## 2024-01-17 PROCEDURE — 99213 OFFICE O/P EST LOW 20 MIN: CPT | Performed by: ORTHOPAEDIC SURGERY

## 2024-01-17 PROCEDURE — 20610 DRAIN/INJ JOINT/BURSA W/O US: CPT | Performed by: ORTHOPAEDIC SURGERY

## 2024-01-17 RX ADMIN — LIDOCAINE HYDROCHLORIDE 8 ML: 10 INJECTION, SOLUTION EPIDURAL; INFILTRATION; INTRACAUDAL; PERINEURAL at 15:40

## 2024-01-17 RX ADMIN — TRIAMCINOLONE ACETONIDE 80 MG: 40 INJECTION, SUSPENSION INTRA-ARTICULAR; INTRAMUSCULAR at 15:40

## 2024-01-17 NOTE — PROGRESS NOTES
"Subjective:     Patient ID: Pamela Mack is a 63 y.o. female.    Chief Complaint:  Follow-up left knee pain, DJD    History of Present Illness  Pamela Mack returns to clinic today for evaluation of  left knee pain.    The patient states that she has had moderate increase in pain, particularly over the last 4 to 6 weeks, aching in nature, localized to the knee, anterior, medial, and lateral joint line of the left knee, aching in nature, exacerbated with prolonged walking, weightbearing, and deep flexion activities. Minimal improvement with rest. Mild swelling does wax and wane. She denies any cass locking or catching.    Social History     Occupational History    Not on file   Tobacco Use    Smoking status: Former     Passive exposure: Past    Smokeless tobacco: Never   Vaping Use    Vaping Use: Never used   Substance and Sexual Activity    Alcohol use: No    Drug use: No    Sexual activity: Not Currently     Birth control/protection: Post-menopausal      Past Medical History:   Diagnosis Date    Anxiety     Arthritis     Colon polyp     Fracture of wrist      Past Surgical History:   Procedure Laterality Date    AVULSION TOENAIL PLATE Bilateral     COLONOSCOPY      COLONOSCOPY N/A 2/9/2023    Procedure: COLONOSCOPY;  Surgeon: Celso Kendrick MD;  Location: [facility];  Service: Gastroenterology;  Laterality: N/A;  diverticulosis    GALLBLADDER SURGERY         Family History   Problem Relation Age of Onset    Cancer Father     Hypertension Brother     Breast cancer Neg Hx     Ovarian cancer Neg Hx     Uterine cancer Neg Hx     Colon cancer Neg Hx     Deep vein thrombosis Neg Hx     Pulmonary embolism Neg Hx          Review of Systems        Objective:  Vitals:    01/17/24 1530   Weight: 97.5 kg (215 lb)   Height: 167.6 cm (66\")         01/17/24  1530   Weight: 97.5 kg (215 lb)     Body mass index is 34.7 kg/m².  Physical Exam    Vital signs reviewed.   General: No acute distress, alert and oriented  Eyes: " conjunctiva clear; pupils equally round and reactive  ENT: external ears and nose atraumatic; oropharynx clear  CV: no peripheral edema  Resp: normal respiratory effort  Skin: no rashes or wounds; normal turgor  Psych: mood and affect appropriate; recent and remote memory intact        Ortho Exam     Left Knee-    Active Range of Motion- 0 to 125 degrees  4+/5 on flexion  4+/5 on extension  Effusion- Mild  Stable opening on varus and valgus stress at 0 and 30  Active patellar compression test- Positive  Log roll- No hip pain  [facility]- No hip pain  Straight leg raise- Negative, left lower extremity    Imaging:  None today  Assessment:        1. Chondromalacia of knee, left           Plan:Large Joint Arthrocentesis: L knee  Date/Time: 1/17/2024 3:40 PM  Consent given by: patient  Site marked: site marked  Timeout: Immediately prior to procedure a time out was called to verify the correct patient, procedure, equipment, support staff and site/side marked as required   Supporting Documentation  Indications: pain   Procedure Details  Location: knee - L knee  Preparation: Patient was prepped and draped in the usual sterile fashion  Needle size: 22 G  Approach: anterolateral  Medications administered: 80 mg triamcinolone acetonide 40 MG/ML; 8 mL lidocaine PF 1% 1 %  Patient tolerance: patient tolerated the procedure well with no immediate complications                Discussed treatment options at length with patient at today's visit.   At this point in time, she would like to proceed with intra-articular injection today given recurrence of pain.  Patient would like to proceed with cortisone injection today to the left knee. Recommended limited use of affected extremity for the next 24 hours to only essential activites other than work on general active and passive motion. Recommended supplementing with ice and soft tissue massage. Discussed with patient that they should see results in 5-7 days, if no improvement in 5-6  weeks I have asked them to call the office to review other options. Patient should call office immediately if they notice redness, warmth, fevers, chills, or residual numbness or tingling for greater than 6 hours after injection.   She is to continue with hip, core, and quad strengthening exercises at this time.  Follow up: She will follow up in 3 months or sooner if needed.      Pamela Frederick was in agreement with plan and had all questions answered.     Orders:  Orders Placed This Encounter   Procedures    Large Joint Arthrocentesis: L knee       Medications:  No orders of the defined types were placed in this encounter.      Followup:  No follow-ups on file.    Diagnoses and all orders for this visit:    1. Chondromalacia of knee, left (Primary)  -     Large Joint Arthrocentesis: L knee      Transcribed from ambient dictation for Zion Taylor MD by Mk Gonzalez.  01/17/24   16:00 EST    Patient or patient representative verbalized consent to the visit recording.  I have personally performed the services described in this document as transcribed by the above individual, and it is both accurate and complete.      Dictated utilizing Dragon dictation

## 2024-01-22 RX ORDER — TRIAMCINOLONE ACETONIDE 40 MG/ML
80 INJECTION, SUSPENSION INTRA-ARTICULAR; INTRAMUSCULAR
Status: COMPLETED | OUTPATIENT
Start: 2024-01-17 | End: 2024-01-17

## 2024-01-22 RX ORDER — LIDOCAINE HYDROCHLORIDE 10 MG/ML
8 INJECTION, SOLUTION EPIDURAL; INFILTRATION; INTRACAUDAL; PERINEURAL
Status: COMPLETED | OUTPATIENT
Start: 2024-01-17 | End: 2024-01-17

## 2024-02-02 ENCOUNTER — HOSPITAL ENCOUNTER (OUTPATIENT)
Dept: MAMMOGRAPHY | Facility: HOSPITAL | Age: 64
Discharge: HOME | End: 2024-02-02
Admitting: OBSTETRICS & GYNECOLOGY
Payer: COMMERCIAL

## 2024-02-02 DIAGNOSIS — Z12.31 ENCOUNTER FOR SCREENING MAMMOGRAM FOR MALIGNANT NEOPLASM OF BREAST: ICD-10-CM

## 2024-02-02 PROCEDURE — 77067 SCR MAMMO BI INCL CAD: CPT

## 2024-02-02 PROCEDURE — 77063 BREAST TOMOSYNTHESIS BI: CPT

## 2024-03-06 ENCOUNTER — TELEPHONE (OUTPATIENT)
Dept: ORTHOPEDIC SURGERY | Facility: CLINIC | Age: 64
End: 2024-03-06
Payer: COMMERCIAL

## 2024-03-06 NOTE — TELEPHONE ENCOUNTER
Caller: Pamela Mack    Relationship: Self    Best call back number:     What is the best time to reach you: ANY    Who are you requesting to speak with (clinical staff, provider,  specific staff member): CLINICAL    What was the call regarding: RIGHT KNEE CORTISONE INJECTION DIDN'T HELP.  PATIENT WANTS TO KNOW WHICH GEL INJECTION SHE IS GOING TO TRY SO SHE CAN ASK INSURANCE ABOUT APPROVAL     Is it okay if the provider responds through [employer]: PLEASE CALL WITH GEL INJECTION THAT SHE WILL TRY TO GET

## 2024-03-07 NOTE — TELEPHONE ENCOUNTER
Provider: KEVAN    Caller: PATIENT    Phone Number: [phone]    Reason for Call: PATIENT STATES THAT SHE IS UNABLE TO ANSWER HER PHONE AT WORK, SO PLEASE LEAVE A DETAILED MESSAGE REGARDING THE TYPE OF GEL INJECTION IF SHE DOESN'T ANSWER.

## 2024-03-11 NOTE — TELEPHONE ENCOUNTER
Told patient that [facility] will usually use [employer] and that it may take 4 wks to get approval.     17-Nov-2021

## 2024-03-12 DIAGNOSIS — M17.12 PRIMARY LOCALIZED OSTEOARTHROSIS OF LEFT LOWER LEG: ICD-10-CM

## 2024-03-12 DIAGNOSIS — M17.11 PRIMARY OSTEOARTHRITIS OF RIGHT KNEE: Primary | ICD-10-CM

## 2024-03-27 ENCOUNTER — OFFICE VISIT (OUTPATIENT)
Dept: ORTHOPEDIC SURGERY | Facility: CLINIC | Age: 64
End: 2024-03-27
Payer: COMMERCIAL

## 2024-03-27 VITALS — HEIGHT: 66 IN | WEIGHT: 215 LBS | BODY MASS INDEX: 34.55 KG/M2

## 2024-03-27 DIAGNOSIS — M94.261 CHONDROMALACIA OF KNEE, RIGHT: ICD-10-CM

## 2024-03-27 DIAGNOSIS — M17.11 PRIMARY OSTEOARTHRITIS OF RIGHT KNEE: Primary | ICD-10-CM

## 2024-03-27 RX ORDER — LIDOCAINE HYDROCHLORIDE 10 MG/ML
8 INJECTION, SOLUTION EPIDURAL; INFILTRATION; INTRACAUDAL; PERINEURAL
Status: COMPLETED | OUTPATIENT
Start: 2024-03-27 | End: 2024-03-27

## 2024-03-27 RX ORDER — TRIAMCINOLONE ACETONIDE 40 MG/ML
80 INJECTION, SUSPENSION INTRA-ARTICULAR; INTRAMUSCULAR
Status: COMPLETED | OUTPATIENT
Start: 2024-03-27 | End: 2024-03-27

## 2024-03-27 RX ADMIN — LIDOCAINE HYDROCHLORIDE 8 ML: 10 INJECTION, SOLUTION EPIDURAL; INFILTRATION; INTRACAUDAL; PERINEURAL at 14:50

## 2024-03-27 RX ADMIN — TRIAMCINOLONE ACETONIDE 80 MG: 40 INJECTION, SUSPENSION INTRA-ARTICULAR; INTRAMUSCULAR at 14:50

## 2024-03-27 NOTE — PROGRESS NOTES
"Subjective:     Patient ID: Pamela Mack is a 63 y.o. female.    Chief Complaint:  Follow-up right knee pain, DJD  Last injection-12/20/2023  History of Present Illness  Pamela Mack returns to clinic today for evaluation of right knee, states overall she has done reasonably well since her last injection except for the last 3 to 4 weeks where she started to have increasing levels of pain, rates it as a 6-7 out of 10, aching in nature localized to the medial and anterior aspect of the right knee.  Worse with prolonged walking weightbearing and deep flexion activities as well as getting up from a seated position.  Denies any cass locking or catching, does note some moderate swelling that does wax and wane     Social History     Occupational History    Not on file   Tobacco Use    Smoking status: Former     Passive exposure: Past    Smokeless tobacco: Never   Vaping Use    Vaping status: Never Used   Substance and Sexual Activity    Alcohol use: No    Drug use: No    Sexual activity: Not Currently     Birth control/protection: Post-menopausal      Past Medical History:   Diagnosis Date    Anxiety     Arthritis     Colon polyp     Fracture of wrist      Past Surgical History:   Procedure Laterality Date    AVULSION TOENAIL PLATE Bilateral     COLONOSCOPY      COLONOSCOPY N/A 2/9/2023    Procedure: COLONOSCOPY;  Surgeon: Celso Kendrick MD;  Location: [facility] OR;  Service: Gastroenterology;  Laterality: N/A;  diverticulosis    GALLBLADDER SURGERY         Family History   Problem Relation Age of Onset    Cancer Father     Hypertension Brother     Breast cancer Neg Hx     Ovarian cancer Neg Hx     Uterine cancer Neg Hx     Colon cancer Neg Hx     Deep vein thrombosis Neg Hx     Pulmonary embolism Neg Hx          Review of Systems        Objective:  Vitals:    03/27/24 1448   Weight: 97.5 kg (215 lb)   Height: 167.6 cm (66\")         03/27/24  1448   Weight: 97.5 kg (215 lb)     Body mass index is 34.7 " kg/m².  General: No acute distress.  Resp: normal respiratory effort  Skin: no rashes or wounds; normal turgor  Psych: mood and affect appropriate; recent and remote memory intact        Ortho Exam     Right knee-active range of motion 0 to 125 degrees, 4+ out of 5 strength on flexion extension, maximal tenderness palpation medial joint line as well as medial lateral patellar facet with positive Liu exam with pain, no click.  Stable to varus valgus stress at 0 and 30 degrees    Imaging:  None today  Assessment:        1. Primary osteoarthritis of right knee    2. Chondromalacia of knee, right           Plan:      Large Joint Arthrocentesis: R knee  Date/Time: 3/27/2024 2:50 PM  Consent given by: patient  Site marked: site marked  Timeout: Immediately prior to procedure a time out was called to verify the correct patient, procedure, equipment, support staff and site/side marked as required   Supporting Documentation  Indications: pain   Procedure Details  Location: knee - R knee  Preparation: Patient was prepped and draped in the usual sterile fashion  Needle size: 22 G  Approach: anterolateral  Medications administered: 8 mL lidocaine PF 1% 1 %; 80 mg triamcinolone acetonide 40 MG/ML  Patient tolerance: patient tolerated the procedure well with no immediate complications          Discussed treatment options at length with patient at today's visit.  At this point in time patient would like to proceed with repeat injections today.  We also discussed options for advanced imaging and total knee arthroplasty but she wants to hold off on this at this time given prior success with injection  Patient would like to proceed with cortisone injection today to the right knee. Recommended limited use of affected extremity for the next 24 hours to only essential activites other than work on general active and passive motion. Recommended supplementing with ice and soft tissue massage. Discussed with patient that they should  see results in 5-7 days, if no improvement in 5-6 weeks I have asked them to call the office to review other options. Patient should call office immediately if they notice redness, warmth, fevers, chills, or residual numbness or tingling for greater than 6 hours after injection.   Continue home exercises to work on hip core and quad strengthening  Follow up: As needed      Pamela Mack was in agreement with plan and had all questions answered.     Orders:  Orders Placed This Encounter   Procedures    Large Joint Arthrocentesis: R knee       Medications:  No orders of the defined types were placed in this encounter.      Followup:  No follow-ups on file.    Diagnoses and all orders for this visit:    1. Primary osteoarthritis of right knee (Primary)    2. Chondromalacia of knee, right    Other orders  -     Large Joint Arthrocentesis: R knee          Dictated utilizing Dragon dictation

## 2024-05-01 ENCOUNTER — OFFICE VISIT (OUTPATIENT)
Dept: ORTHOPEDIC SURGERY | Facility: CLINIC | Age: 64
End: 2024-05-01
Payer: COMMERCIAL

## 2024-05-01 VITALS — HEIGHT: 66 IN | WEIGHT: 215 LBS | BODY MASS INDEX: 34.55 KG/M2

## 2024-05-01 DIAGNOSIS — M94.262 CHONDROMALACIA OF KNEE, LEFT: Primary | ICD-10-CM

## 2024-05-01 RX ORDER — IBUPROFEN 600 MG/1
600 TABLET ORAL EVERY 6 HOURS PRN
COMMUNITY

## 2024-05-01 RX ADMIN — TRIAMCINOLONE ACETONIDE 80 MG: 40 INJECTION, SUSPENSION INTRA-ARTICULAR; INTRAMUSCULAR at 15:29

## 2024-05-01 RX ADMIN — LIDOCAINE HYDROCHLORIDE 8 ML: 10 INJECTION, SOLUTION EPIDURAL; INFILTRATION; INTRACAUDAL; PERINEURAL at 15:29

## 2024-05-06 ENCOUNTER — TELEPHONE (OUTPATIENT)
Dept: ORTHOPEDIC SURGERY | Facility: CLINIC | Age: 64
End: 2024-05-06
Payer: COMMERCIAL

## 2024-05-12 RX ORDER — LIDOCAINE HYDROCHLORIDE 10 MG/ML
8 INJECTION, SOLUTION EPIDURAL; INFILTRATION; INTRACAUDAL; PERINEURAL
Status: COMPLETED | OUTPATIENT
Start: 2024-05-01 | End: 2024-05-01

## 2024-05-12 RX ORDER — TRIAMCINOLONE ACETONIDE 40 MG/ML
80 INJECTION, SUSPENSION INTRA-ARTICULAR; INTRAMUSCULAR
Status: COMPLETED | OUTPATIENT
Start: 2024-05-01 | End: 2024-05-01

## 2024-05-15 ENCOUNTER — CLINICAL SUPPORT (OUTPATIENT)
Dept: ORTHOPEDIC SURGERY | Facility: CLINIC | Age: 64
End: 2024-05-15
Payer: COMMERCIAL

## 2024-05-15 VITALS — HEIGHT: 66 IN | BODY MASS INDEX: 34.55 KG/M2 | WEIGHT: 215 LBS

## 2024-05-15 DIAGNOSIS — M17.11 PRIMARY OSTEOARTHRITIS OF RIGHT KNEE: Primary | ICD-10-CM

## 2024-05-15 NOTE — PROGRESS NOTES
- Large Joint Arthrocentesis: R knee on 5/15/2024 2:11 PM  Indications: pain  Details: 22 G needle, anterolateral approach  Medications: 25 mg sodium hyaluronate 25 MG/2.5ML  Outcome: tolerated well, no immediate complications  Procedure, treatment alternatives, risks and benefits explained, specific risks discussed. Consent was given by the patient. Immediately prior to procedure a time out was called to verify the correct patient, procedure, equipment, support staff and site/side marked as required. Patient was prepped and draped in the usual sterile fashion.       Patient presents to clinic today for right knee viscosupplement injections.  This is the 1st injection of the series.  I explained details of injections as well as risks, benefits and alternatives with the patient today, had all questions answered, wished to proceed with injections.  I will see patient back in 1 week for repeat injection.  Patient was instructed to watch for signs or symptoms of infection including redness, swelling, warmth to the touch, or significant increased pain and to contact our office immediately if any of these issues were noted.

## 2024-05-20 ENCOUNTER — OFFICE VISIT (OUTPATIENT)
Dept: OBSTETRICS AND GYNECOLOGY | Facility: CLINIC | Age: 64
End: 2024-05-20
Payer: COMMERCIAL

## 2024-05-20 VITALS
SYSTOLIC BLOOD PRESSURE: 130 MMHG | WEIGHT: 213 LBS | HEIGHT: 66 IN | BODY MASS INDEX: 34.23 KG/M2 | DIASTOLIC BLOOD PRESSURE: 82 MMHG

## 2024-05-20 DIAGNOSIS — Z01.419 ROUTINE GYNECOLOGICAL EXAMINATION: ICD-10-CM

## 2024-05-20 DIAGNOSIS — R31.9 HEMATURIA, UNSPECIFIED TYPE: Primary | ICD-10-CM

## 2024-05-20 DIAGNOSIS — N95.2 VAGINAL ATROPHY: ICD-10-CM

## 2024-05-20 DIAGNOSIS — Z11.51 SPECIAL SCREENING EXAMINATION FOR HUMAN PAPILLOMAVIRUS (HPV): ICD-10-CM

## 2024-05-20 DIAGNOSIS — D06.9 CIN III (CERVICAL INTRAEPITHELIAL NEOPLASIA III): ICD-10-CM

## 2024-05-20 DIAGNOSIS — N81.9 FEMALE GENITAL PROLAPSE, UNSPECIFIED TYPE: ICD-10-CM

## 2024-05-20 DIAGNOSIS — Z01.419 CERVICAL SMEAR, AS PART OF ROUTINE GYNECOLOGICAL EXAMINATION: ICD-10-CM

## 2024-05-20 DIAGNOSIS — Z12.31 ENCOUNTER FOR SCREENING MAMMOGRAM FOR MALIGNANT NEOPLASM OF BREAST: ICD-10-CM

## 2024-05-20 LAB
BILIRUB BLD-MCNC: NEGATIVE MG/DL
CLARITY, POC: CLEAR
COLOR UR: YELLOW
GLUCOSE UR STRIP-MCNC: NEGATIVE MG/DL
KETONES UR QL: NEGATIVE
LEUKOCYTE EST, POC: NEGATIVE
NITRITE UR-MCNC: NEGATIVE MG/ML
PH UR: 5 [PH] (ref 5–8)
PROT UR STRIP-MCNC: NEGATIVE MG/DL
RBC # UR STRIP: ABNORMAL /UL
SP GR UR: 1 (ref 1–1.03)
UROBILINOGEN UR QL: NORMAL

## 2024-05-20 NOTE — PROGRESS NOTES
GYN Exam    CC- Here for annual     Pamela Frederick is a 63 y.o. female est pt who presents for annual exam. She say urology and they said she had a kidney cyst that they will follow yearly.  She has some blood in her urine today. She says her prolapse is more bothersome and she is ready for surgical management      OB History          1    Para   1    Term   1            AB        Living   1         SAB        IAB        Ectopic        Molar        Multiple        Live Births              Obstetric Comments   1  9.2 pound infant               Menarche:13  Menopause:ablation mid 40s  HRT:none  Current contraception: post menopausal status  History of abnormal Pap smear: no  History of abnormal mammogram: no  Family history of uterine, colon or ovarian cancer: no  Family history of breast cancer: no  STD's: none   Last pap smear 2022- nl pap/HPV  Gardasil: missed  MITZI: none   POP      Health Maintenance   Topic Date Due    HEPATITIS C SCREENING  Never done    ANNUAL PHYSICAL  Never done    RSV Vaccine - Adults (1 - 1-dose 60+ series) Never done    COVID-19 Vaccine (3 - - season) 2023    Annual Gynecologic Pelvic and Breast Exam  2024    INFLUENZA VACCINE  2024    BMI FOLLOWUP  2024    TDAP/TD VACCINES (2 - Td or Tdap) 2025    MAMMOGRAM  2026    PAP SMEAR  2027    COLORECTAL CANCER SCREENING  2028    ZOSTER VACCINE  Completed    Pneumococcal Vaccine 0-64  Aged Out       Past Medical History:   Diagnosis Date    Anxiety     Arthritis     Cancer     SSC of forearm    Colon polyp     Fracture of wrist        Past Surgical History:   Procedure Laterality Date    AVULSION TOENAIL PLATE Bilateral     COLONOSCOPY      COLONOSCOPY N/A 2023    Procedure: COLONOSCOPY;  Surgeon: Celso Kendrick MD;  Location: [facility];  Service: Gastroenterology;  Laterality: N/A;  diverticulosis    GALLBLADDER SURGERY           Current Outpatient Medications:      ALPRAZolam (XANAX) 0.25 MG tablet, Xanax 0.25 mg tablet  Take 1 tablet every day by oral route as needed., Disp: , Rfl:     Calcium Carbonate-Vitamin D 600-200 MG-UNIT tablet, Every 12 (Twelve) Hours., Disp: , Rfl:     cetirizine (zyrTEC) 10 MG tablet, cetirizine 10 mg tablet  Take 1 tablet every day by oral route., Disp: , Rfl:     fluticasone (FLONASE) 50 MCG/ACT nasal spray, fluticasone propionate 50 mcg/actuation nasal spray,suspension  1-2 sprays each nostril daily, Disp: , Rfl:     ibuprofen (ADVIL,MOTRIN) 600 MG tablet, Take 1 tablet by mouth Every 6 (Six) Hours As Needed for Mild Pain., Disp: , Rfl:     estradiol (ESTRACE) 0.1 MG/GM vaginal cream, Apply pea sized amount to urethra twice a week before bedtime, Disp: 45 g, Rfl: 2    meloxicam (MOBIC) 15 MG tablet, Take 1 tablet by mouth Daily., Disp: 30 tablet, Rfl: 0    Allergies   Allergen Reactions    Erythromycin Myalgia       Social History     Tobacco Use    Smoking status: Former     Passive exposure: Past    Smokeless tobacco: Never   Vaping Use    Vaping status: Never Used   Substance Use Topics    Alcohol use: No    Drug use: No       Family History   Problem Relation Age of Onset    Cancer Father     Hypertension Brother     Breast cancer Neg Hx     Ovarian cancer Neg Hx     Uterine cancer Neg Hx     Colon cancer Neg Hx     Deep vein thrombosis Neg Hx     Pulmonary embolism Neg Hx        Review of Systems   Constitutional:  Positive for activity change. Negative for appetite change, fatigue, fever and unexpected weight change.   Eyes:  Negative for photophobia and visual disturbance.   Respiratory:  Negative for cough and shortness of breath.    Cardiovascular:  Negative for chest pain and palpitations.   Gastrointestinal:  Negative for abdominal distention, abdominal pain, constipation, diarrhea and nausea.   Endocrine: Negative for cold intolerance and heat intolerance.   Genitourinary:  Positive for vaginal pain (bulge). Negative for  "dyspareunia, dysuria, menstrual problem, pelvic pain, vaginal bleeding and vaginal discharge.   Musculoskeletal:  Negative for back pain.   Skin:  Negative for color change and rash.   Neurological:  Negative for headaches.   Hematological:  Negative for adenopathy. Does not bruise/bleed easily.   Psychiatric/Behavioral:  Negative for dysphoric mood. The patient is not nervous/anxious.           /82   Ht 167.6 cm (66\")   Wt 96.6 kg (213 lb)   Breastfeeding No   BMI 34.38 kg/m²     Physical Exam  Vitals and nursing note reviewed. Exam conducted with a chaperone present.   Constitutional:       Appearance: Normal appearance. She is well-developed. She is obese.   HENT:      Head: Normocephalic and atraumatic.   Eyes:      General: No scleral icterus.     Conjunctiva/sclera: Conjunctivae normal.   Neck:      Thyroid: No thyromegaly.   Cardiovascular:      Rate and Rhythm: Normal rate and regular rhythm.   Pulmonary:      Breath sounds: Normal breath sounds.   Chest:   Breasts:     Right: No swelling, bleeding, inverted nipple, mass, nipple discharge, skin change or tenderness.      Left: No swelling, bleeding, inverted nipple, mass, nipple discharge, skin change or tenderness.   Abdominal:      General: There is no distension.      Palpations: Abdomen is soft. There is no mass.      Tenderness: There is no abdominal tenderness. There is no guarding or rebound.      Hernia: No hernia is present.   Genitourinary:     Labia:         Right: No rash, tenderness, lesion or injury.         Left: No rash, tenderness, lesion or injury.       Urethra: Prolapse present. No urethral pain, urethral swelling or urethral lesion.      Vagina: No signs of injury and foreign body. Prolapsed vaginal walls present. No vaginal discharge, erythema, tenderness, bleeding or lesions.      Cervix: No cervical motion tenderness, discharge, friability, lesion, erythema, cervical bleeding or eversion.      Uterus: Normal. With uterine " prolapse.       Adnexa: Right adnexa normal.      Rectum: Normal.          Comments: Exam limited due to habitus  Grade 2-3 cystocele and rectocele  Musculoskeletal:      Cervical back: Neck supple.   Skin:     General: Skin is warm and dry.   Neurological:      Mental Status: She is alert and oriented to person, place, and time.   Psychiatric:         Mood and Affect: Mood normal.         Behavior: Behavior normal.         Thought Content: Thought content normal.         Judgment: Judgment normal.            Assessment/Plan  1) POP- pt has grade 2-3 cystocele and rectocele on exam.  We again discussed treatment options and she wants to see urogyn. Will refer to [facility].   2) GYN HM: normal  pap/HPV 5/2022. Check pap/HPVSBE demonstrated and encouraged.  3) STD screening: declines Condoms encouraged.  4) Bone health - Weight bearing exercise, dietary calcium recommendations and vitamin D reviewed.   5) Diet and Exercise discussed  6) Smoking Status: No  7) Social: no issues  8)MMG: UTD 2/2024 B2. Schedule MMG 2/2025, pt prefers [facility]  9) DEXA-UTD 4/2023- normal BMD, repeat in 3-5 years  10)C scope-UTD 2/2023- repeat 7 years   11) H/O hematuria-.check UA and CS. pt has seen urology and will continue to see them yearly  12) Urethral caruncle-  Rx for Estrace, pea-sized amount to urethra twice a week before bedtime.Patient counseled that vaginal estrogen rings, creams and tablets are available and highly effective at treating local vaginal symptoms such as atrophy and vaginal dryness.  Vaginal estrogen does not cause uterine overgrowth and does not require a progestogen to protect the uterus.  Very small amounts of estrogen are absorbed systemically.  For patients with a history of an estrogen dependent cancer such as breast cancer, the decision to use local estrogen for local vaginal symptoms should be made after consultation with her oncologist.  Possible side effects include local irritation or burning and/or  vaginal bleeding and should always be reported.    13)Parts of this document have been copied or forwarded from her previous visits and have been reviewed, updated and edited as indicated.   14) RTO 1 year annual and/or prn  15)  I spent > 10  minutes on the separately reported service of POP. This time is not included in the time used to support the annual E/M service also reported today.          Diagnoses and all orders for this visit:    1. Hematuria, unspecified type (Primary)  -     Urine Culture - Urine, Urine, Random Void  -     Urinalysis With Microscopic - Urine, Random Void    2. Routine gynecological examination  -     POC Urinalysis Dipstick  -     IGP, Apt HPV,rfx 16 / 18,45    3. Cervical smear, as part of routine gynecological examination  -     POC Urinalysis Dipstick  -     IGP, Apt HPV,rfx 16 / 18,45    4. Special screening examination for human papillomavirus (HPV)  -     POC Urinalysis Dipstick  -     IGP, Apt HPV,rfx 16 / 18,45    5. Encounter for screening mammogram for malignant neoplasm of breast  -     Mammo Screening Digital Tomosynthesis Bilateral With CAD; Future    6. Female genital prolapse, unspecified type  -     Ambulatory Referral to Gynecologic Urology    7. Vaginal atrophy    Other orders  -     Microscopic Examination -        Sandi Baker MD  5/20/2024        13:15 EDT

## 2024-05-22 ENCOUNTER — CLINICAL SUPPORT (OUTPATIENT)
Dept: ORTHOPEDIC SURGERY | Facility: CLINIC | Age: 64
End: 2024-05-22
Payer: COMMERCIAL

## 2024-05-22 VITALS — HEIGHT: 66 IN | WEIGHT: 213 LBS | BODY MASS INDEX: 34.23 KG/M2

## 2024-05-22 DIAGNOSIS — M17.11 PRIMARY OSTEOARTHRITIS OF RIGHT KNEE: Primary | ICD-10-CM

## 2024-05-22 LAB
APPEARANCE UR: CLEAR
BACTERIA #/AREA URNS HPF: NORMAL /[HPF]
BACTERIA UR CULT: NORMAL
BACTERIA UR CULT: NORMAL
BILIRUB UR QL STRIP: NEGATIVE
CASTS URNS QL MICRO: NORMAL /LPF
COLOR UR: YELLOW
EPI CELLS #/AREA URNS HPF: NORMAL /HPF (ref 0–10)
GLUCOSE UR QL STRIP: NEGATIVE
HGB UR QL STRIP: NEGATIVE
KETONES UR QL STRIP: NEGATIVE
LEUKOCYTE ESTERASE UR QL STRIP: ABNORMAL
MICRO URNS: ABNORMAL
NITRITE UR QL STRIP: NEGATIVE
PH UR STRIP: 6 [PH] (ref 5–7.5)
PROT UR QL STRIP: NEGATIVE
RBC #/AREA URNS HPF: NORMAL /HPF (ref 0–2)
SP GR UR STRIP: 1.01 (ref 1–1.03)
UROBILINOGEN UR STRIP-MCNC: 0.2 MG/DL (ref 0.2–1)
WBC #/AREA URNS HPF: NORMAL /HPF (ref 0–5)

## 2024-05-22 RX ORDER — ESTRADIOL 0.1 MG/G
CREAM VAGINAL
Qty: 45 G | Refills: 2 | Status: SHIPPED | OUTPATIENT
Start: 2024-05-22

## 2024-05-22 NOTE — PROGRESS NOTES
Large Joint Arthrocentesis: R knee  Date/Time: 5/22/2024 2:49 PM  Consent given by: patient  Site marked: site marked  Timeout: Immediately prior to procedure a time out was called to verify the correct patient, procedure, equipment, support staff and site/side marked as required   Supporting Documentation  Indications: pain   Procedure Details  Location: knee - R knee  Preparation: Patient was prepped and draped in the usual sterile fashion  Needle size: 18 G  Medications administered: 25 mg sodium hyaluronate 25 MG/2.5ML  Patient tolerance: patient tolerated the procedure well with no immediate complications        Patient presents to clinic today for right knee viscosupplement injections.  This is the 2nd injection of the series.  I explained details of injections as well as risks, benefits and alternatives with the patient today, had all questions answered, wished to proceed with injections.  I will see patient back in 1 week for repeat injection.  Patient was instructed to watch for signs or symptoms of infection including redness, swelling, warmth to the touch, or significant increased pain and to contact our office immediately if any of these issues were noted.

## 2024-05-23 LAB
CYTOLOGIST CVX/VAG CYTO: NORMAL
CYTOLOGY CVX/VAG DOC CYTO: NORMAL
CYTOLOGY CVX/VAG DOC THIN PREP: NORMAL
DX ICD CODE: NORMAL
HPV I/H RISK 4 DNA CVX QL PROBE+SIG AMP: NEGATIVE
Lab: NORMAL
OTHER STN SPEC: NORMAL
STAT OF ADQ CVX/VAG CYTO-IMP: NORMAL

## 2024-06-05 ENCOUNTER — CLINICAL SUPPORT (OUTPATIENT)
Dept: ORTHOPEDIC SURGERY | Facility: CLINIC | Age: 64
End: 2024-06-05
Payer: COMMERCIAL

## 2024-06-05 VITALS — HEIGHT: 66 IN | BODY MASS INDEX: 34.23 KG/M2 | WEIGHT: 213 LBS

## 2024-06-05 DIAGNOSIS — M17.11 PRIMARY OSTEOARTHRITIS OF RIGHT KNEE: Primary | ICD-10-CM

## 2024-06-05 PROCEDURE — 20610 DRAIN/INJ JOINT/BURSA W/O US: CPT | Performed by: ORTHOPAEDIC SURGERY

## 2024-06-05 NOTE — PROGRESS NOTES
- Large Joint Arthrocentesis: R knee on 6/5/2024 3:54 PM  Indications: pain  Details: 21 G needle, anterolateral approach  Medications: 25 mg sodium hyaluronate 25 MG/2.5ML  Outcome: tolerated well, no immediate complications  Procedure, treatment alternatives, risks and benefits explained, specific risks discussed. Consent was given by the patient. Immediately prior to procedure a time out was called to verify the correct patient, procedure, equipment, support staff and site/side marked as required. Patient was prepped and draped in the usual sterile fashion.         Patient presents to clinic today for right knee viscosupplement injections.  This is the 3rd injection of the series.  I explained details of injections as well as risks, benefits and alternatives with the patient today, had all questions answered, wished to proceed with injections.  I will see patient back in 6 weeks for re-evaluation. Patient was instructed to watch for signs or symptoms of infection including redness, swelling, warmth to the touch, or significant increased pain and to contact our office immediately if any of these issues were noted.

## 2024-06-07 NOTE — TELEPHONE ENCOUNTER
Message left for patient.    Thanks.  
Order entered for meloxicam  
Patient is taking IBU 600mg- one a day for the pain with little to no relief.    DX: Primary osteoarthritis of right knee - left knee    Patient is asking about a daily anti inflammatory.  
Provider: DR MATHUR     Caller: YENIFER PEDRO     Relationship to Patient: PATIENT     Pharmacy: [id number] - [place], [place] - [address] AT [facility] & [address] - [phone]  - [phone]  [place] [address] Phone: [phone] Fax: [phone]     Phone Number: [phone] [facility] TO [facility]    Reason for Call: HIGH LEVEL OF PAIN IN RIGHT KNEE, SEEKING PAIN RELIEF IN THE INTERIM PRIOR TO NEXT INJECTION     When was the patient last seen: 10/09/2023 FOR LEFT KNEE INJECTION   LAST INJECTION RIGHT KNEE 09/18/23, FUTURE RIGHT KNEE INJECTION  12/21/23  
Unknown if ever smoked

## 2024-07-08 ENCOUNTER — OFFICE VISIT (OUTPATIENT)
Dept: ORTHOPEDIC SURGERY | Facility: CLINIC | Age: 64
End: 2024-07-08
Payer: COMMERCIAL

## 2024-07-08 VITALS — WEIGHT: 213 LBS | BODY MASS INDEX: 34.23 KG/M2 | HEIGHT: 66 IN

## 2024-07-08 DIAGNOSIS — M17.11 PRIMARY OSTEOARTHRITIS OF RIGHT KNEE: Primary | ICD-10-CM

## 2024-07-08 DIAGNOSIS — M94.261 CHONDROMALACIA OF KNEE, RIGHT: ICD-10-CM

## 2024-07-08 PROCEDURE — 99213 OFFICE O/P EST LOW 20 MIN: CPT | Performed by: ORTHOPAEDIC SURGERY

## 2024-07-08 PROCEDURE — 20610 DRAIN/INJ JOINT/BURSA W/O US: CPT | Performed by: ORTHOPAEDIC SURGERY

## 2024-07-08 RX ADMIN — TRIAMCINOLONE ACETONIDE 80 MG: 40 INJECTION, SUSPENSION INTRA-ARTICULAR; INTRAMUSCULAR at 15:48

## 2024-07-08 RX ADMIN — LIDOCAINE HYDROCHLORIDE 8 ML: 10 INJECTION, SOLUTION EPIDURAL; INFILTRATION; INTRACAUDAL; PERINEURAL at 15:48

## 2024-07-08 NOTE — PROGRESS NOTES
Subjective:     Patient ID: Pamela Mack is a 63 y.o. female.    Chief Complaint:  Follow-up right knee pain, DJD  Last injection-6/5/2024-viscosupplement    History of Present Illness  History of Present Illness  The patient returns to clinic today for follow-up evaluation in regards to her right knee.    The patient reports a satisfactory improvement in her right knee condition following the viscosupplement injection administered at the beginning of 06/2023. However, she continues to experience moderate, aching pain in the anterior and medial aspects of her right knee, which she quantifies as a 5 to 6 out of 10 in severity. 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 The pain has shown minimal improvement with rest and activity modification. She denies experiencing any radiation of pain or any hip or groin pain.     Social History     Occupational History    Not on file   Tobacco Use    Smoking status: Former     Passive exposure: Past    Smokeless tobacco: Never   Vaping Use    Vaping status: Never Used   Substance and Sexual Activity    Alcohol use: No    Drug use: No    Sexual activity: Not Currently     Birth control/protection: Post-menopausal      Past Medical History:   Diagnosis Date    Anxiety     Arthritis     Cancer     SSC of forearm    Colon polyp     Fracture of wrist      Past Surgical History:   Procedure Laterality Date    AVULSION TOENAIL PLATE Bilateral     COLONOSCOPY      COLONOSCOPY N/A 2/9/2023    Procedure: COLONOSCOPY;  Surgeon: Celso Kendrick MD;  Location: [facility];  Service: Gastroenterology;  Laterality: N/A;  diverticulosis    GALLBLADDER SURGERY         Family History   Problem Relation Age of Onset    Cancer Father     Hypertension Brother     Breast cancer Neg Hx     Ovarian cancer Neg Hx     Uterine cancer  "Neg Hx     Colon cancer Neg Hx     Deep vein thrombosis Neg Hx     Pulmonary embolism Neg Hx          Review of Systems        Objective:  Vitals:    07/08/24 1504   Weight: 96.6 kg (213 lb)   Height: 167.6 cm (66\")         07/08/24  1504   Weight: 96.6 kg (213 lb)     Body mass index is 34.38 kg/m².  General: No acute distress.  Resp: normal respiratory effort  Skin: no rashes or wounds; normal turgor  Psych: mood and affect appropriate; recent and remote memory intact          Physical Exam  Right knee demonstrates active range of motion from 0 to 130 degrees, 4+ out of 5 strength on flexion and extension. Maximum tenderness to palpation is observed over the medial and lateral patellar facet. Moderately positive active patellar compression test is noted. Negative patellar apprehension test is noted. Liu's exam is mildly positive with pain along the medial joint line, no click. Stable to varus and valgus stress at 0 and 30 degrees. Mild effusion is present.         Imaging:  None today  Assessment:        1. Primary osteoarthritis of right knee    2. Chondromalacia of knee, right           Plan:  Large Joint Arthrocentesis: R knee  Date/Time: 7/8/2024 3:48 PM  Consent given by: patient  Site marked: site marked  Timeout: Immediately prior to procedure a time out was called to verify the correct patient, procedure, equipment, support staff and site/side marked as required   Supporting Documentation  Indications: pain   Procedure Details  Location: knee - R knee  Preparation: Patient was prepped and draped in the usual sterile fashion  Needle size: 22 G  Approach: anterolateral  Medications administered: 80 mg triamcinolone acetonide 40 MG/ML; 8 mL lidocaine PF 1% 1 %  Patient tolerance: patient tolerated the procedure well with no immediate complications              Assessment & Plan  1. Right knee pain.  A comprehensive discussion was held with the patient regarding potential treatment options, including " observation, physical therapy, bracing, cortisone intra-articular injection, and advanced imaging. She expressed a preference for a repeat cortisone injection today. She will persist with hip, core, and quad strengthening exercises independently at home. All her queries were addressed.    Patient would like to proceed with cortisone injection today to the right knee. Recommended limited use of affected extremity for the next 24 hours to only essential activites other than work on general active and passive motion. Recommended supplementing with ice and soft tissue massage. Discussed with patient that they should see results in 5-7 days, if no improvement in 5-6 weeks I have asked them to call the office to review other options. Patient should call office immediately if they notice redness, warmth, fevers, chills, or residual numbness or tingling for greater than 6 hours after injection.       Follow-up  Follow-up visits will be scheduled as necessary.    Pamela Mack was in agreement with plan and had all questions answered.     Orders:  No orders of the defined types were placed in this encounter.      Medications:  No orders of the defined types were placed in this encounter.      Followup:  No follow-ups on file.    Diagnoses and all orders for this visit:    1. Primary osteoarthritis of right knee (Primary)    2. Chondromalacia of knee, right          Dictated utilizing Dragon dictation     Patient or patient representative verbalized consent for the use of Ambient Listening during the visit with  Zion Taylor MD for chart documentation. 7/22/2024  10:19 EDT

## 2024-07-22 RX ORDER — LIDOCAINE HYDROCHLORIDE 10 MG/ML
8 INJECTION, SOLUTION EPIDURAL; INFILTRATION; INTRACAUDAL; PERINEURAL
Status: COMPLETED | OUTPATIENT
Start: 2024-07-08 | End: 2024-07-08

## 2024-07-22 RX ORDER — TRIAMCINOLONE ACETONIDE 40 MG/ML
80 INJECTION, SUSPENSION INTRA-ARTICULAR; INTRAMUSCULAR
Status: COMPLETED | OUTPATIENT
Start: 2024-07-08 | End: 2024-07-08

## 2024-08-26 ENCOUNTER — CLINICAL SUPPORT (OUTPATIENT)
Dept: ORTHOPEDIC SURGERY | Facility: CLINIC | Age: 64
End: 2024-08-26
Payer: COMMERCIAL

## 2024-08-26 VITALS — HEIGHT: 66 IN | BODY MASS INDEX: 34.23 KG/M2 | WEIGHT: 213 LBS

## 2024-08-26 DIAGNOSIS — M94.262 CHONDROMALACIA OF KNEE, LEFT: Primary | ICD-10-CM

## 2024-08-26 DIAGNOSIS — M17.12 PRIMARY LOCALIZED OSTEOARTHROSIS OF LEFT LOWER LEG: ICD-10-CM

## 2024-08-26 PROCEDURE — 20610 DRAIN/INJ JOINT/BURSA W/O US: CPT | Performed by: ORTHOPAEDIC SURGERY

## 2024-08-26 RX ORDER — TRIAMCINOLONE ACETONIDE 40 MG/ML
80 INJECTION, SUSPENSION INTRA-ARTICULAR; INTRAMUSCULAR
Status: COMPLETED | OUTPATIENT
Start: 2024-08-26 | End: 2024-08-26

## 2024-08-26 RX ORDER — LIDOCAINE HYDROCHLORIDE 10 MG/ML
8 INJECTION, SOLUTION EPIDURAL; INFILTRATION; INTRACAUDAL; PERINEURAL
Status: COMPLETED | OUTPATIENT
Start: 2024-08-26 | End: 2024-08-26

## 2024-08-26 RX ADMIN — TRIAMCINOLONE ACETONIDE 80 MG: 40 INJECTION, SUSPENSION INTRA-ARTICULAR; INTRAMUSCULAR at 14:44

## 2024-08-26 RX ADMIN — LIDOCAINE HYDROCHLORIDE 8 ML: 10 INJECTION, SOLUTION EPIDURAL; INFILTRATION; INTRACAUDAL; PERINEURAL at 14:44

## 2024-08-26 NOTE — PROGRESS NOTES
Large Joint Arthrocentesis: L knee  Date/Time: 8/26/2024 2:44 PM  Consent given by: patient  Site marked: site marked  Timeout: Immediately prior to procedure a time out was called to verify the correct patient, procedure, equipment, support staff and site/side marked as required   Supporting Documentation  Indications: pain   Procedure Details  Location: knee - L knee  Preparation: Patient was prepped and draped in the usual sterile fashion  Needle size: 22 G  Approach: anterolateral  Medications administered: 80 mg triamcinolone acetonide 40 MG/ML; 8 mL lidocaine PF 1% 1 %  Patient tolerance: patient tolerated the procedure well with no immediate complications        Cc: Left knee DJD    Patient presents to clinic today for left knee intra-articular cortisone injection(s). I explained details of injections as well as risks, benefits and alternatives with the patient today, had all questions answered, wished to proceed with injections. Patient was instructed to watch for signs or symptoms of infection including redness, swelling, warmth to the touch, or significant increased pain and to contact our office immediately if any of these issues were noted.

## 2024-11-04 ENCOUNTER — APPOINTMENT (OUTPATIENT)
Dept: WOUND CARE | Facility: HOSPITAL | Age: 64
End: 2024-11-04
Payer: COMMERCIAL

## 2024-11-04 ENCOUNTER — LAB REQUISITION (OUTPATIENT)
Dept: LAB | Facility: HOSPITAL | Age: 64
End: 2024-11-04
Payer: COMMERCIAL

## 2024-11-04 DIAGNOSIS — L98.492 NON-PRESSURE CHRONIC ULCER OF SKIN OF OTHER SITES WITH FAT LAYER EXPOSED: ICD-10-CM

## 2024-11-04 DIAGNOSIS — T81.328A DISRUPTION OR DEHISCENCE OF CLOSURE OF OTHER SPECIFIED INTERNAL OPERATION (SURGICAL) WOUND, INITIAL ENCOUNTER: ICD-10-CM

## 2024-11-04 PROCEDURE — 87205 SMEAR GRAM STAIN: CPT | Performed by: NURSE PRACTITIONER

## 2024-11-04 PROCEDURE — G0463 HOSPITAL OUTPT CLINIC VISIT: HCPCS

## 2024-11-04 PROCEDURE — 87070 CULTURE OTHR SPECIMN AEROBIC: CPT | Performed by: NURSE PRACTITIONER

## 2024-11-04 PROCEDURE — 87077 CULTURE AEROBIC IDENTIFY: CPT | Performed by: NURSE PRACTITIONER

## 2024-11-04 PROCEDURE — 87186 SC STD MICRODIL/AGAR DIL: CPT | Performed by: NURSE PRACTITIONER

## 2024-11-07 LAB
BACTERIA SPEC AEROBE CULT: ABNORMAL
BACTERIA SPEC AEROBE CULT: ABNORMAL
GRAM STN SPEC: ABNORMAL
GRAM STN SPEC: ABNORMAL

## 2024-11-11 ENCOUNTER — APPOINTMENT (OUTPATIENT)
Dept: WOUND CARE | Facility: HOSPITAL | Age: 64
End: 2024-11-11
Payer: COMMERCIAL

## 2024-11-11 PROCEDURE — G0463 HOSPITAL OUTPT CLINIC VISIT: HCPCS

## 2024-11-20 ENCOUNTER — OFFICE VISIT (OUTPATIENT)
Dept: ORTHOPEDIC SURGERY | Facility: CLINIC | Age: 64
End: 2024-11-20
Payer: COMMERCIAL

## 2024-11-20 VITALS — BODY MASS INDEX: 34.23 KG/M2 | HEIGHT: 66 IN | WEIGHT: 213 LBS

## 2024-11-20 DIAGNOSIS — M94.262 CHONDROMALACIA OF KNEE, LEFT: ICD-10-CM

## 2024-11-20 DIAGNOSIS — M17.11 PRIMARY OSTEOARTHRITIS OF RIGHT KNEE: Primary | ICD-10-CM

## 2024-11-20 DIAGNOSIS — M17.12 PRIMARY LOCALIZED OSTEOARTHROSIS OF LEFT LOWER LEG: ICD-10-CM

## 2024-11-20 DIAGNOSIS — M94.261 CHONDROMALACIA OF KNEE, RIGHT: ICD-10-CM

## 2024-11-20 RX ADMIN — TRIAMCINOLONE ACETONIDE 80 MG: 40 INJECTION, SUSPENSION INTRA-ARTICULAR; INTRAMUSCULAR at 15:53

## 2024-11-20 RX ADMIN — LIDOCAINE HYDROCHLORIDE 8 ML: 10 INJECTION, SOLUTION EPIDURAL; INFILTRATION; INTRACAUDAL; PERINEURAL at 15:53

## 2024-11-20 NOTE — PROGRESS NOTES
Subjective:     Patient ID: Pamela Mack is a 64 y.o. female.    Chief Complaint:  Follow-up bilateral knee pain, DJD  Last injection-left knee-8/26/2024    History of Present Illness  History of Present Illness  Pamela returns clinic today for follow-up evaluation regards to her right knee, states she has been having increasing pain on that side particularly into the medial aspect of the knee, rates as moderate to severe in intensity, aching in nature, denies any hip or groin pain.  Denies radiation of pain from the knee itself.  Denies any locking does note some occasional catching and painful crepitus.  Swelling waxes and wanes.  Denies any associated numbness or tingling.  Exacerbated with prolonged walking, weightbearing, getting up from a seated position, and deep flexion activities.     Social History     Occupational History    Not on file   Tobacco Use    Smoking status: Former     Passive exposure: Past    Smokeless tobacco: Never   Vaping Use    Vaping status: Never Used   Substance and Sexual Activity    Alcohol use: No    Drug use: No    Sexual activity: Not Currently     Birth control/protection: Post-menopausal      Past Medical History:   Diagnosis Date    Anxiety     Arthritis     Cancer     SSC of forearm    Colon polyp     Fracture of wrist      Past Surgical History:   Procedure Laterality Date    AVULSION TOENAIL PLATE Bilateral     COLONOSCOPY      COLONOSCOPY N/A 02/09/2023    Procedure: COLONOSCOPY;  Surgeon: Celso Kendrick MD;  Location: [facility];  Service: Gastroenterology;  Laterality: N/A;  diverticulosis    GALLBLADDER SURGERY      HYSTERECTOMY  09/03/2024       Family History   Problem Relation Age of Onset    Cancer Father     Hypertension Brother     Breast cancer Neg Hx     Ovarian cancer Neg Hx     Uterine cancer Neg Hx     Colon cancer Neg Hx     Deep vein thrombosis Neg Hx     Pulmonary embolism Neg Hx          Review of Systems        Objective:  Vitals:    11/20/24  "1531   Weight: 96.6 kg (213 lb)   Height: 167.6 cm (66\")         11/20/24  1531   Weight: 96.6 kg (213 lb)     Body mass index is 34.38 kg/m².  General: No acute distress.  Resp: normal respiratory effort  Skin: no rashes or wounds; normal turgor  Psych: mood and affect appropriate; recent and remote memory intact          Physical Exam         Right knee-maximal tenderness palpation medial joint line as well as medial lateral patellar facet, positive active patella compression test, moderate tenderness along medial joint line with positive click on Liu exam.  Significant tenderness along lateral joint line positive Liu exam and pain on clinic.  Stable to varus and valgus stress at 3 and 30 degrees.  Active range of motion 3 to 125 degrees, 4+ out of 5 strength.  Moderate effusion.    Imaging:  Bilateral Knee X-Ray  Indication: Pain    AP, Lateral, and [place] views    Findings:  No fracture  No bony lesion  Normal soft tissues  Moderate tricompartmental osteoarthritis no significant lateral compartment with slight valgus alignment noted, right is more advanced than the left, irregular subchondral surface noted with reactive osteophyte formation appreciated, significant patellofemoral disease with bone-on-bone articulation particular the right side.  Compared to prior office x-rays.    Assessment:        1. Primary osteoarthritis of right knee    2. Chondromalacia of knee, right    3. Chondromalacia of knee, left    4. Primary localized osteoarthrosis of left lower leg           Plan:      Large Joint Arthrocentesis: R knee  Date/Time: 11/20/2024 3:53 PM  Consent given by: patient  Site marked: site marked  Timeout: Immediately prior to procedure a time out was called to verify the correct patient, procedure, equipment, support staff and site/side marked as required   Supporting Documentation  Indications: pain   Procedure Details  Location: knee - R knee  Preparation: Patient was prepped and draped in the " usual sterile fashion  Needle size: 22 G  Approach: anterolateral  Medications administered: 8 mL lidocaine PF 1% 1 %; 80 mg triamcinolone acetonide 40 MG/ML  Patient tolerance: patient tolerated the procedure well with no immediate complications          Assessment & Plan  Discussed treatment options like with patient, at this point in time given the predominance of pain in the right knee as well as recent left knee injection, she would like to hold off on left knee at this point but would like to proceed with right knee intra-articular injection today.  We did discuss option for total knee arthroplasty but she wants to hold off on that at this point.  She can continue working on home exercises as well as hip core and quad strengthening and low impact activities.    Patient would like to proceed with cortisone injection today to the right knee. Recommended limited use of affected extremity for the next 24 hours to only essential activites other than work on general active and passive motion. Recommended supplementing with ice and soft tissue massage. Discussed with patient that they should see results in 5-7 days, if no improvement in 5-6 weeks I have asked them to call the office to review other options. Patient should call office immediately if they notice redness, warmth, fevers, chills, or residual numbness or tingling for greater than 6 hours after injection.       Pamela Mack was in agreement with plan and had all questions answered.     Orders:  Orders Placed This Encounter   Procedures    Large Joint Arthrocentesis: R knee    XR Knee 3 View Bilateral       Medications:  No orders of the defined types were placed in this encounter.      Followup:  No follow-ups on file.    Diagnoses and all orders for this visit:    1. Primary osteoarthritis of right knee (Primary)  -     XR Knee 3 View Bilateral    2. Chondromalacia of knee, right  -     XR Knee 3 View Bilateral    3. Chondromalacia of knee, left  -     XR  Knee 3 View Bilateral    4. Primary localized osteoarthrosis of left lower leg  -     XR Knee 3 View Bilateral    Other orders  -     Large Joint Arthrocentesis: R knee          BMI is >= 30 and <35. (Class 1 Obesity). The following options were offered after discussion;: exercise counseling/recommendations       Dictated utilizing Dragon dictation     Patient or patient representative verbalized consent for the use of Ambient Listening during the visit with  Zion Taylor MD for chart documentation. 11/20/2024  16:02 EST

## 2024-11-25 RX ORDER — LIDOCAINE HYDROCHLORIDE 10 MG/ML
8 INJECTION, SOLUTION EPIDURAL; INFILTRATION; INTRACAUDAL; PERINEURAL
Status: COMPLETED | OUTPATIENT
Start: 2024-11-20 | End: 2024-11-20

## 2024-11-25 RX ORDER — TRIAMCINOLONE ACETONIDE 40 MG/ML
80 INJECTION, SUSPENSION INTRA-ARTICULAR; INTRAMUSCULAR
Status: COMPLETED | OUTPATIENT
Start: 2024-11-20 | End: 2024-11-20

## 2024-11-27 ENCOUNTER — OFFICE VISIT (OUTPATIENT)
Dept: ORTHOPEDIC SURGERY | Facility: CLINIC | Age: 64
End: 2024-11-27
Payer: COMMERCIAL

## 2024-11-27 ENCOUNTER — APPOINTMENT (OUTPATIENT)
Dept: WOUND CARE | Facility: HOSPITAL | Age: 64
End: 2024-11-27
Payer: COMMERCIAL

## 2024-11-27 VITALS — HEIGHT: 66 IN | BODY MASS INDEX: 34.23 KG/M2 | WEIGHT: 213 LBS

## 2024-11-27 DIAGNOSIS — M17.12 PRIMARY LOCALIZED OSTEOARTHROSIS OF LEFT LOWER LEG: Primary | ICD-10-CM

## 2024-11-27 DIAGNOSIS — M17.12 PRIMARY OSTEOARTHRITIS OF LEFT KNEE: ICD-10-CM

## 2024-11-27 PROCEDURE — G0463 HOSPITAL OUTPT CLINIC VISIT: HCPCS

## 2024-11-27 RX ADMIN — LIDOCAINE HYDROCHLORIDE 8 ML: 10 INJECTION, SOLUTION EPIDURAL; INFILTRATION; INTRACAUDAL; PERINEURAL at 12:59

## 2024-11-27 RX ADMIN — TRIAMCINOLONE ACETONIDE 80 MG: 40 INJECTION, SUSPENSION INTRA-ARTICULAR; INTRAMUSCULAR at 12:59

## 2024-11-27 NOTE — PROGRESS NOTES
Subjective:     Patient ID: Pamela Mack is a 64 y.o. female.    Chief Complaint:  Follow-up left knee pain, DJD  Last injection 8/26/2024  History of Present Illness  History of Present Illness  The patient returns to the clinic today for a follow-up evaluation regarding her left knee.    She reports that her previous injection was highly effective. However, over the past 3 to 4 weeks, she has experienced escalating pain in her left knee. The pain is described as aching, particularly on the inner and front part of the knee, and she rates it as a 7 to 8 out of 10. The pain intensifies with prolonged walking, weight-bearing activities, and deep bending movements.    She mentions occasional painful clicking, especially along the inner joint line, but there are no instances of the knee catching. She also reports moderate swelling that fluctuates. There have been no episodes of the knee buckling or giving way, and she does not experience any pain in the left hip or groin.     Social History     Occupational History    Not on file   Tobacco Use    Smoking status: Former     Passive exposure: Past    Smokeless tobacco: Never   Vaping Use    Vaping status: Never Used   Substance and Sexual Activity    Alcohol use: No    Drug use: No    Sexual activity: Not Currently     Birth control/protection: Post-menopausal      Past Medical History:   Diagnosis Date    Anxiety     Arthritis     Cancer     SSC of forearm    Colon polyp     Fracture of wrist      Past Surgical History:   Procedure Laterality Date    AVULSION TOENAIL PLATE Bilateral     COLONOSCOPY      COLONOSCOPY N/A 02/09/2023    Procedure: COLONOSCOPY;  Surgeon: Celso Kendrick MD;  Location: [facility];  Service: Gastroenterology;  Laterality: N/A;  diverticulosis    GALLBLADDER SURGERY      HYSTERECTOMY  09/03/2024       Family History   Problem Relation Age of Onset    Cancer Father     Hypertension Brother     Breast cancer Neg Hx     Ovarian cancer Neg  "Hx     Uterine cancer Neg Hx     Colon cancer Neg Hx     Deep vein thrombosis Neg Hx     Pulmonary embolism Neg Hx          Review of Systems        Objective:  Vitals:    11/27/24 1257   Weight: 96.6 kg (213 lb)   Height: 167.6 cm (66\")         11/27/24  1257   Weight: 96.6 kg (213 lb)     Body mass index is 34.38 kg/m².  General: No acute distress.  Resp: normal respiratory effort  Skin: no rashes or wounds; normal turgor  Psych: mood and affect appropriate; recent and remote memory intact          Physical Exam  Left knee demonstrates an active range of motion from 0 to 125 degrees, with 4 plus out of 5 strength on flexion and extension. It is stable to varus and valgus stress at 0 and 30 degrees. A grade 1A Lachman is observed, with negative anterior and posterior drawer. Maximal tenderness is noted at the medial joint line as well as medial and lateral patellar facet. Positive active patellar compression test and positive Liu's exam with pain along the medial joint line are also observed.         Imaging:  None today  Assessment:        1. Primary localized osteoarthrosis of left lower leg    2. Primary osteoarthritis of left knee           Plan:    - Large Joint Arthrocentesis: L knee on 11/27/2024 12:59 PM  Indications: pain  Details: 22 G needle, anterolateral approach  Medications: 80 mg triamcinolone acetonide 40 MG/ML; 8 mL lidocaine PF 1% 1 %  Outcome: tolerated well, no immediate complications  Procedure, treatment alternatives, risks and benefits explained, specific risks discussed. Consent was given by the patient. Immediately prior to procedure a time out was called to verify the correct patient, procedure, equipment, support staff and site/side marked as required. Patient was prepped and draped in the usual sterile fashion.               Assessment & Plan  1. Left knee pain.  She reports increasing pain in the left knee over the last 3 to 4 weeks, rated as 7-8 out of 10, aching in nature, " particularly over the medial and anterior aspect. The pain worsens with prolonged walking, weightbearing, and deep flexion activities. There is moderate swelling that waxes and wanes, occasional painful clicking along the medial joint line, but no catching, buckling, or giving way episodes. Examination reveals active range of motion from 0 to 125 degrees, 4+ out of 5 strength on flexion and extension, stable to varus and valgus stress, grade 1 Lachman, negative anterior and posterior drawer, maximal tenderness at the medial joint line and medial and lateral patellar facet, positive active patellar compression test, and positive Liu's exam with pain along the medial joint line.    Discussed treatment options including total knee arthroplasty, hip and core strengthening exercises, physical therapy, and intra-articular injection. She opted for a repeat injection today due to prior success and prefers to hold off on surgical intervention at this time. She will continue working on hip, core, and quad strengthening exercises at home as tolerated. All questions answered.    Patient would like to proceed with cortisone injection today to the left knee. Recommended limited use of affected extremity for the next 24 hours to only essential activites other than work on general active and passive motion. Recommended supplementing with ice and soft tissue massage. Discussed with patient that they should see results in 5-7 days, if no improvement in 5-6 weeks I have asked them to call the office to review other options. Patient should call office immediately if they notice redness, warmth, fevers, chills, or residual numbness or tingling for greater than 6 hours after injection.     Follow-up  Return in 3 to 4 months or as needed.    Pamela Mack was in agreement with plan and had all questions answered.     Orders:  Orders Placed This Encounter   Procedures    - Large Joint Arthrocentesis: L knee       Medications:  No orders  of the defined types were placed in this encounter.      Followup:  No follow-ups on file.    Diagnoses and all orders for this visit:    1. Primary localized osteoarthrosis of left lower leg (Primary)    2. Primary osteoarthritis of left knee    Other orders  -     - Large Joint Arthrocentesis: L knee                  Dictated utilizing Dragon dictation     Patient or patient representative verbalized consent for the use of Ambient Listening during the visit with  Zion Taylor MD for chart documentation. 11/27/2024  13:42 EST

## 2024-11-30 RX ORDER — LIDOCAINE HYDROCHLORIDE 10 MG/ML
8 INJECTION, SOLUTION EPIDURAL; INFILTRATION; INTRACAUDAL; PERINEURAL
Status: COMPLETED | OUTPATIENT
Start: 2024-11-27 | End: 2024-11-27

## 2024-11-30 RX ORDER — TRIAMCINOLONE ACETONIDE 40 MG/ML
80 INJECTION, SUSPENSION INTRA-ARTICULAR; INTRAMUSCULAR
Status: COMPLETED | OUTPATIENT
Start: 2024-11-27 | End: 2024-11-27

## 2025-02-03 ENCOUNTER — HOSPITAL ENCOUNTER (OUTPATIENT)
Dept: MAMMOGRAPHY | Facility: HOSPITAL | Age: 65
Discharge: HOME | End: 2025-02-03
Admitting: OBSTETRICS & GYNECOLOGY
Payer: COMMERCIAL

## 2025-02-03 DIAGNOSIS — Z12.31 ENCOUNTER FOR SCREENING MAMMOGRAM FOR MALIGNANT NEOPLASM OF BREAST: ICD-10-CM

## 2025-02-03 PROCEDURE — 77067 SCR MAMMO BI INCL CAD: CPT

## 2025-02-03 PROCEDURE — 77063 BREAST TOMOSYNTHESIS BI: CPT

## 2025-02-03 PROCEDURE — 77067 SCR MAMMO BI INCL CAD: CPT | Performed by: RADIOLOGY

## 2025-02-03 PROCEDURE — 77063 BREAST TOMOSYNTHESIS BI: CPT | Performed by: RADIOLOGY

## 2025-02-24 ENCOUNTER — OFFICE VISIT (OUTPATIENT)
Dept: ORTHOPEDIC SURGERY | Facility: CLINIC | Age: 65
End: 2025-02-24
Payer: COMMERCIAL

## 2025-02-24 VITALS — BODY MASS INDEX: 33.75 KG/M2 | WEIGHT: 210 LBS | HEIGHT: 66 IN

## 2025-02-24 DIAGNOSIS — M17.11 PRIMARY OSTEOARTHRITIS OF RIGHT KNEE: Primary | ICD-10-CM

## 2025-02-24 PROCEDURE — 99213 OFFICE O/P EST LOW 20 MIN: CPT | Performed by: ORTHOPAEDIC SURGERY

## 2025-02-24 PROCEDURE — 20610 DRAIN/INJ JOINT/BURSA W/O US: CPT | Performed by: ORTHOPAEDIC SURGERY

## 2025-02-24 RX ORDER — LIDOCAINE HYDROCHLORIDE 10 MG/ML
8 INJECTION, SOLUTION EPIDURAL; INFILTRATION; INTRACAUDAL; PERINEURAL
Status: COMPLETED | OUTPATIENT
Start: 2025-02-24 | End: 2025-02-24

## 2025-02-24 RX ORDER — TRIAMCINOLONE ACETONIDE 40 MG/ML
80 INJECTION, SUSPENSION INTRA-ARTICULAR; INTRAMUSCULAR
Status: COMPLETED | OUTPATIENT
Start: 2025-02-24 | End: 2025-02-24

## 2025-02-24 RX ADMIN — TRIAMCINOLONE ACETONIDE 80 MG: 40 INJECTION, SUSPENSION INTRA-ARTICULAR; INTRAMUSCULAR at 15:00

## 2025-02-24 RX ADMIN — LIDOCAINE HYDROCHLORIDE 8 ML: 10 INJECTION, SOLUTION EPIDURAL; INFILTRATION; INTRACAUDAL; PERINEURAL at 15:00

## 2025-02-24 NOTE — PROGRESS NOTES
"Subjective:     Patient ID: Pamela Mack is a 64 y.o. female.    Chief Complaint:  Follow-up right knee pain, DJD    History of Present Illness  History of Present Illness  Pamela returns to clinic today for follow-up of her right knee, she has noted increasing pain particular over the last 4 weeks, localized to the medial and anterior aspect of her right knee, exacerbated with prolonged walking, weightbearing, deep flexion activities.  Rates pain as moderate to severe in intensity, 8-9 out of 10, aching in nature.  Denies hip or groin pain.  Denies numbness or tingling, denies radiation of pain from the knee itself.  Good improvement with prior intra-articular injection for approximately 2 months with greater than 90% relief of pain.     Social History     Occupational History    Not on file   Tobacco Use    Smoking status: Former     Passive exposure: Past    Smokeless tobacco: Never   Vaping Use    Vaping status: Never Used   Substance and Sexual Activity    Alcohol use: No    Drug use: No    Sexual activity: Not Currently     Birth control/protection: Post-menopausal      Past Medical History:   Diagnosis Date    Anxiety     Arthritis     Cancer     SSC of forearm    Colon polyp     Fracture of wrist      Past Surgical History:   Procedure Laterality Date    AVULSION TOENAIL PLATE Bilateral     COLONOSCOPY      COLONOSCOPY N/A 02/09/2023    Procedure: COLONOSCOPY;  Surgeon: Celso Kendrick MD;  Location: [facility];  Service: Gastroenterology;  Laterality: N/A;  diverticulosis    GALLBLADDER SURGERY      HYSTERECTOMY  09/03/2024       Family History   Problem Relation Age of Onset    Cancer Father     Hypertension Brother     Breast cancer Neg Hx     Ovarian cancer Neg Hx     Uterine cancer Neg Hx     Colon cancer Neg Hx     Deep vein thrombosis Neg Hx     Pulmonary embolism Neg Hx          Review of Systems        Objective:  Vitals:    02/24/25 1455   Weight: 95.3 kg (210 lb)   Height: 167.6 cm (66\") "         02/24/25  1455   Weight: 95.3 kg (210 lb)     Body mass index is 33.89 kg/m².  Physical Exam    Vital signs reviewed.   General: No acute distress, alert and oriented  Eyes: conjunctiva clear; pupils equally round and reactive  ENT: external ears and nose atraumatic; oropharynx clear  CV: no peripheral edema  Resp: normal respiratory effort  Skin: no rashes or wounds; normal turgor  Psych: mood and affect appropriate; recent and remote memory intact         Physical Exam         Right knee-maximal tenderness palpation medial joint line as well as medial lateral patellar facet, moderate effusion, active range of motion0 to 130 degrees, 4+ out of 5 strength on flexion and extension.  Positive Brooke exam with pain along medial joint line, no click.  Positive active patella compression test.  No hip pain on logroll or Stinchfield exam.    Imaging:  None today  Assessment:        1. Primary osteoarthritis of right knee           Plan:      Large Joint Arthrocentesis: R knee  Date/Time: 2/24/2025 3:00 PM  Consent given by: patient  Site marked: site marked  Timeout: Immediately prior to procedure a time out was called to verify the correct patient, procedure, equipment, support staff and site/side marked as required   Supporting Documentation  Indications: pain   Procedure Details  Location: knee - R knee  Preparation: Patient was prepped and draped in the usual sterile fashion  Needle size: 22 G  Approach: anterolateral  Medications administered: 8 mL lidocaine PF 1% 1 %; 80 mg triamcinolone acetonide 40 MG/ML  Patient tolerance: patient tolerated the procedure well with no immediate complications          Assessment & Plan  Reviewed treatment options at length with patient including but not limited to observation, therapy, repeat injection, and total knee arthroplasty.  Her injections are getting a little less effective for her at this point in time but she states the pain is still not bad enough to consider  proceeding with total knee at this time.  Given options she would like to proceed with repeat injection today as well as continuing home exercises.    Patient would like to proceed with cortisone injection today to the right knee. Recommended limited use of affected extremity for the next 24 hours to only essential activites other than work on general active and passive motion. Recommended supplementing with ice and soft tissue massage. Discussed with patient that they should see results in 5-7 days, if no improvement in 5-6 weeks I have asked them to call the office to review other options. Patient should call office immediately if they notice redness, warmth, fevers, chills, or residual numbness or tingling for greater than 6 hours after injection.       Pamela Mack was in agreement with plan and had all questions answered.     Orders:  Orders Placed This Encounter   Procedures    Large Joint Arthrocentesis: R knee       Medications:  No orders of the defined types were placed in this encounter.      Followup:  No follow-ups on file.    Diagnoses and all orders for this visit:    1. Primary osteoarthritis of right knee (Primary)    Other orders  -     Large Joint Arthrocentesis: R knee                  Dictated utilizing Dragon dictation     Patient or patient representative verbalized consent for the use of Ambient Listening during the visit with  Zion Taylor MD for chart documentation. 2/24/2025  17:13 EST

## 2025-03-10 ENCOUNTER — OFFICE VISIT (OUTPATIENT)
Dept: ORTHOPEDIC SURGERY | Facility: CLINIC | Age: 65
End: 2025-03-10
Payer: COMMERCIAL

## 2025-03-10 VITALS — BODY MASS INDEX: 33.75 KG/M2 | WEIGHT: 210 LBS | HEIGHT: 66 IN

## 2025-03-10 DIAGNOSIS — M17.12 PRIMARY LOCALIZED OSTEOARTHROSIS OF LEFT LOWER LEG: Primary | ICD-10-CM

## 2025-03-10 PROCEDURE — 99213 OFFICE O/P EST LOW 20 MIN: CPT | Performed by: ORTHOPAEDIC SURGERY

## 2025-03-10 PROCEDURE — 20610 DRAIN/INJ JOINT/BURSA W/O US: CPT | Performed by: ORTHOPAEDIC SURGERY

## 2025-03-10 RX ORDER — LIDOCAINE HYDROCHLORIDE 10 MG/ML
8 INJECTION, SOLUTION EPIDURAL; INFILTRATION; INTRACAUDAL; PERINEURAL
Status: COMPLETED | OUTPATIENT
Start: 2025-03-10 | End: 2025-03-10

## 2025-03-10 RX ORDER — TRIAMCINOLONE ACETONIDE 40 MG/ML
80 INJECTION, SUSPENSION INTRA-ARTICULAR; INTRAMUSCULAR
Status: COMPLETED | OUTPATIENT
Start: 2025-03-10 | End: 2025-03-10

## 2025-03-10 RX ADMIN — TRIAMCINOLONE ACETONIDE 80 MG: 40 INJECTION, SUSPENSION INTRA-ARTICULAR; INTRAMUSCULAR at 15:10

## 2025-03-10 RX ADMIN — LIDOCAINE HYDROCHLORIDE 8 ML: 10 INJECTION, SOLUTION EPIDURAL; INFILTRATION; INTRACAUDAL; PERINEURAL at 15:10

## 2025-03-10 NOTE — PROGRESS NOTES
Subjective:     Patient ID: Pamela Mack is a 64 y.o. female.    Chief Complaint:  Follow-up left knee pain, DJD    History of Present Illness  History of Present Illness  The patient returns to the clinic today for a follow-up evaluation regarding her left knee.    She reports that the last injection provided significant relief, with over 90 percent reduction in pain for approximately 2.5 months. However, in the past 2 to 3 weeks, she has experienced escalating pain, particularly on the medial aspect of her left knee. The pain is described as aching and is rated at 7 to 8 out of 10 in severity. She also notes moderate swelling that fluctuates in intensity, worsening with prolonged walking, weightbearing, rising from a seated position, and deep flexion. She reports no hip or groin pain. There has been minimal improvement with activity modification, rest, and home exercises.     Social History     Occupational History    Not on file   Tobacco Use    Smoking status: Former     Passive exposure: Past    Smokeless tobacco: Never   Vaping Use    Vaping status: Never Used   Substance and Sexual Activity    Alcohol use: No    Drug use: No    Sexual activity: Not Currently     Birth control/protection: Post-menopausal      Past Medical History:   Diagnosis Date    Anxiety     Arthritis     Cancer     SSC of forearm    Colon polyp     Fracture of wrist      Past Surgical History:   Procedure Laterality Date    AVULSION TOENAIL PLATE Bilateral     COLONOSCOPY      COLONOSCOPY N/A 02/09/2023    Procedure: COLONOSCOPY;  Surgeon: Celso Kendrick MD;  Location: [facility];  Service: Gastroenterology;  Laterality: N/A;  diverticulosis    GALLBLADDER SURGERY      HYSTERECTOMY  09/03/2024       Family History   Problem Relation Age of Onset    Cancer Father     Hypertension Brother     Breast cancer Neg Hx     Ovarian cancer Neg Hx     Uterine cancer Neg Hx     Colon cancer Neg Hx     Deep vein thrombosis Neg Hx      "Pulmonary embolism Neg Hx          Review of Systems        Objective:  Vitals:    03/10/25 1506   Weight: 95.3 kg (210 lb)   Height: 167.6 cm (66\")         03/10/25  1506   Weight: 95.3 kg (210 lb)     Body mass index is 33.89 kg/m².  Physical Exam    Vital signs reviewed.   General: No acute distress, alert and oriented  Eyes: conjunctiva clear; pupils equally round and reactive  ENT: external ears and nose atraumatic; oropharynx clear  CV: no peripheral edema  Resp: normal respiratory effort  Skin: no rashes or wounds; normal turgor  Psych: mood and affect appropriate; recent and remote memory intact          Physical Exam  The left knee has an active range of motion from 2 to 125 degrees, with 4+ out of 5 strength in flexion and extension. There is moderate effusion in the knee, with maximal tenderness to palpation along the medial joint line as well as the medial and lateral patellar facet. A positive patellar compression test and a positive Liu exam with pain along the medial joint line, but no click, were observed. The knee is stable to varus and valgus stress at 2 and 30 degrees.         Imaging:  None today  Assessment:        1. Primary localized osteoarthrosis of left lower leg           Plan:  Large Joint Arthrocentesis: L knee  Date/Time: 3/10/2025 3:10 PM  Consent given by: patient  Site marked: site marked  Timeout: Immediately prior to procedure a time out was called to verify the correct patient, procedure, equipment, support staff and site/side marked as required   Supporting Documentation  Indications: pain   Procedure Details  Location: knee - L knee  Preparation: Patient was prepped and draped in the usual sterile fashion  Needle size: 22 G  Approach: anterolateral  Medications administered: 80 mg triamcinolone acetonide 40 MG/ML; 8 mL lidocaine PF 1% 1 %  Patient tolerance: patient tolerated the procedure well with no immediate complications                Assessment & Plan  1. Left knee " pain.  The patient reports that the last injection provided greater than 90% relief of her pain for about 2.5 months. However, over the last 2 to 3 weeks, she has noted increasing pain, particularly over the medial aspect of her left knee, rated as 7-8 out of 10. She also experiences moderate swelling that worsens with prolonged walking, weightbearing, getting up from a seated position, and deep flexion. Physical examination reveals moderate effusion, maximal tenderness to palpation at the medial joint line, and a positive Liu exam with pain along the medial joint line. Treatment options discussed include observation, physical therapy, total knee arthroplasty, and repeat injection. She elected to proceed with an injection today and continue with home exercises.    Follow-up  The patient will follow up in 3 months or sooner if needed.    Pamela Frederick was in agreement with plan and had all questions answered.     Orders:  Orders Placed This Encounter   Procedures    Large Joint Arthrocentesis: L knee       Medications:  No orders of the defined types were placed in this encounter.      Followup:  No follow-ups on file.    Diagnoses and all orders for this visit:    1. Primary localized osteoarthrosis of left lower leg (Primary)    Other orders  -     Large Joint Arthrocentesis: L knee                  Dictated utilizing Dragon dictation     Patient or patient representative verbalized consent for the use of Ambient Listening during the visit with  Zion Taylor MD for chart documentation. 3/10/2025  15:25 EDT

## 2025-05-28 ENCOUNTER — OFFICE VISIT (OUTPATIENT)
Dept: ORTHOPEDIC SURGERY | Facility: CLINIC | Age: 65
End: 2025-05-28
Payer: COMMERCIAL

## 2025-05-28 VITALS — BODY MASS INDEX: 33.75 KG/M2 | HEIGHT: 66 IN | WEIGHT: 210 LBS

## 2025-05-28 DIAGNOSIS — M17.11 PRIMARY OSTEOARTHRITIS OF RIGHT KNEE: Primary | ICD-10-CM

## 2025-05-28 RX ORDER — LIDOCAINE HYDROCHLORIDE 10 MG/ML
8 INJECTION, SOLUTION EPIDURAL; INFILTRATION; INTRACAUDAL; PERINEURAL
Status: COMPLETED | OUTPATIENT
Start: 2025-05-28 | End: 2025-05-28

## 2025-05-28 RX ORDER — TRIAMCINOLONE ACETONIDE 40 MG/ML
80 INJECTION, SUSPENSION INTRA-ARTICULAR; INTRAMUSCULAR
Status: COMPLETED | OUTPATIENT
Start: 2025-05-28 | End: 2025-05-28

## 2025-05-28 RX ADMIN — LIDOCAINE HYDROCHLORIDE 8 ML: 10 INJECTION, SOLUTION EPIDURAL; INFILTRATION; INTRACAUDAL; PERINEURAL at 15:35

## 2025-05-28 RX ADMIN — TRIAMCINOLONE ACETONIDE 80 MG: 40 INJECTION, SUSPENSION INTRA-ARTICULAR; INTRAMUSCULAR at 15:35

## 2025-05-28 NOTE — PROGRESS NOTES
Subjective:     Patient ID: Pamela Mack is a 64 y.o. female.    Chief Complaint:  Follow-up right knee pain, DJD  Last injection 2/24/2025  History of Present Illness  History of Present Illness  Patient returns to clinic today for follow-up evaluation regards to her right knee, states that last injection worked very well for about 6 weeks but over the last 4 to 5 weeks she has noted significant increasing pain to the right knee localized primarily to the medial and anterior aspect of the knee worse with prolonged walking weightbearing and deep flexion activities.  Minimal improvement with rest.  Moderate in intensity rates as a 6-8 out of 10, denies any hip or groin pain.  Denies radiation of pain.  Moderate swelling that does wax and wane.     Social History     Occupational History    Not on file   Tobacco Use    Smoking status: Former     Passive exposure: Past    Smokeless tobacco: Never   Vaping Use    Vaping status: Never Used   Substance and Sexual Activity    Alcohol use: No    Drug use: No    Sexual activity: Not Currently     Birth control/protection: Post-menopausal      Past Medical History:   Diagnosis Date    Anxiety     Arthritis     Cancer     SSC of forearm    Colon polyp     Fracture of wrist      Past Surgical History:   Procedure Laterality Date    AVULSION TOENAIL PLATE Bilateral     COLONOSCOPY      COLONOSCOPY N/A 02/09/2023    Procedure: COLONOSCOPY;  Surgeon: Celso Kendrick MD;  Location: [facility] OR;  Service: Gastroenterology;  Laterality: N/A;  diverticulosis    GALLBLADDER SURGERY      HYSTERECTOMY  09/03/2024       Family History   Problem Relation Age of Onset    Cancer Father     Hypertension Brother     Breast cancer Neg Hx     Ovarian cancer Neg Hx     Uterine cancer Neg Hx     Colon cancer Neg Hx     Deep vein thrombosis Neg Hx     Pulmonary embolism Neg Hx          Review of Systems        Objective:  Vitals:    05/28/25 1531   Weight: 95.3 kg (210 lb)   Height: 167.6  "cm (66\")         05/28/25  1531   Weight: 95.3 kg (210 lb)     Body mass index is 33.89 kg/m².  Physical Exam    Vital signs reviewed.   General: No acute distress, alert and oriented  Eyes: conjunctiva clear; pupils equally round and reactive  ENT: external ears and nose atraumatic; oropharynx clear  CV: no peripheral edema  Resp: normal respiratory effort  Skin: no rashes or wounds; normal turgor  Psych: mood and affect appropriate; recent and remote memory intact          Physical Exam         Right knee-active range of motion 3 to 120 degrees, 4+ out of 5 strength on flexion and extension, maximal tenderness to palpation medial joint line, moderate tenderness medial and lateral patellar facet, positive active patellar compression test.  Moderate effusion.  Stable to varus and valgus stress at 3 and 30 degrees    Imaging:  None today  Assessment:        1. Primary osteoarthritis of right knee           Plan:    - Large Joint Arthrocentesis: R knee on 5/28/2025 3:35 PM  Indications: pain  Details: 22 G needle, anterolateral approach  Medications: 80 mg triamcinolone acetonide 40 MG/ML; 8 mL lidocaine PF 1% 1 %  Outcome: tolerated well, no immediate complications  Procedure, treatment alternatives, risks and benefits explained, specific risks discussed. Consent was given by the patient. Immediately prior to procedure a time out was called to verify the correct patient, procedure, equipment, support staff and site/side marked as required. Patient was prepped and draped in the usual sterile fashion.                 Assessment & Plan  Discussed treatment options at length with patient including but not limited to observation, physical therapy, home exercises which she will continue at this point in time, repeat injection, and total knee arthroplasty.  She is leaning towards proceeding with total knee but she wants to wait until September if possible.  At this point in time given good improvement with previous " injection even though it was limited in timeframe she would like to proceed with a repeat injection today.    Patient would like to proceed with cortisone injection today to the right knee. Recommended limited use of affected extremity for the next 24 hours to only essential activites other than work on general active and passive motion. Recommended supplementing with ice and soft tissue massage. Discussed with patient that they should see results in 5-7 days, if no improvement in 5-6 weeks I have asked them to call the office to review other options. Patient should call office immediately if they notice redness, warmth, fevers, chills, or residual numbness or tingling for greater than 6 hours after injection.       Pamela Mack was in agreement with plan and had all questions answered.     Orders:  Orders Placed This Encounter   Procedures    - Large Joint Arthrocentesis: R knee       Medications:  No orders of the defined types were placed in this encounter.      Followup:  No follow-ups on file.    Diagnoses and all orders for this visit:    1. Primary osteoarthritis of right knee (Primary)    Other orders  -     - Large Joint Arthrocentesis: R knee                  Dictated utilizing Dragon dictation     Patient or patient representative verbalized consent for the use of Ambient Listening during the visit with  Zion Taylor MD for chart documentation. 5/28/2025  15:44 EDT

## 2025-05-29 ENCOUNTER — OFFICE VISIT (OUTPATIENT)
Dept: OBSTETRICS AND GYNECOLOGY | Facility: CLINIC | Age: 65
End: 2025-05-29
Payer: COMMERCIAL

## 2025-05-29 VITALS
SYSTOLIC BLOOD PRESSURE: 140 MMHG | HEIGHT: 66 IN | WEIGHT: 217 LBS | DIASTOLIC BLOOD PRESSURE: 80 MMHG | BODY MASS INDEX: 34.87 KG/M2

## 2025-05-29 DIAGNOSIS — Z78.0 ASYMPTOMATIC MENOPAUSE: ICD-10-CM

## 2025-05-29 DIAGNOSIS — Z01.419 ROUTINE GYNECOLOGICAL EXAMINATION: ICD-10-CM

## 2025-05-29 DIAGNOSIS — Z11.51 SPECIAL SCREENING EXAMINATION FOR HUMAN PAPILLOMAVIRUS (HPV): ICD-10-CM

## 2025-05-29 DIAGNOSIS — Z01.419 CERVICAL SMEAR, AS PART OF ROUTINE GYNECOLOGICAL EXAMINATION: Primary | ICD-10-CM

## 2025-05-29 DIAGNOSIS — Z12.31 ENCOUNTER FOR SCREENING MAMMOGRAM FOR MALIGNANT NEOPLASM OF BREAST: ICD-10-CM

## 2025-05-29 DIAGNOSIS — N95.2 VAGINAL ATROPHY: ICD-10-CM

## 2025-05-29 NOTE — PROGRESS NOTES
GYN Exam    CC- Here for annual     Pamela Frederick is a 64 y.o. female est pt who presents for annual exam. She say urology and they said she had a kidney cyst that they will follow yearly.  In 2024 she had a TLH/BS with uterosacral ligament suspension, A&P repair and TVT.   She wants a pap yearly.  No postmenopausal bleeding.    OB History          1    Para   1    Term   1            AB        Living   1         SAB        IAB        Ectopic        Molar        Multiple        Live Births   1          Obstetric Comments   1  9.2 pound infant               Menarche:13  Menopause:ablation mid 40s  HRT:none  Current contraception: post menopausal status & 2024- TLH/BS/ USLS A&P repair, TVT  History of abnormal Pap smear: no  History of abnormal mammogram: no  Family history of uterine, colon or ovarian cancer: no  Family history of breast cancer: no  STD's: none   Last pap smear 2024- nl pap/HPV  Gardasil: missed  MITZI: none   POP  Once Pap yearly    Health Maintenance   Topic Date Due    HEPATITIS C SCREENING  Never done    ANNUAL PHYSICAL  Never done    COVID-19 Vaccine ( season) 2024    Annual Gynecologic Pelvic and Breast Exam  2025    INFLUENZA VACCINE  2025    MAMMOGRAM  2027    COLORECTAL CANCER SCREENING  2028    TDAP/TD VACCINES (3 - Td or Tdap) 2035    Pneumococcal Vaccine 50+  Completed    ZOSTER VACCINE  Completed       Past Medical History:   Diagnosis Date    Anxiety     Arthritis     Cancer     SSC of forearm    Colon polyp     Fracture of wrist     Pelvic prolapse        Past Surgical History:   Procedure Laterality Date    ANTERIOR AND POSTERIOR VAGINAL REPAIR      AVULSION TOENAIL PLATE Bilateral     BLADDER SUSPENSION      COLONOSCOPY      COLONOSCOPY N/A 2023    Procedure: COLONOSCOPY;  Surgeon: Celso Kendrick MD;  Location: [facility];  Service: Gastroenterology;  Laterality: N/A;  diverticulosis    GALLBLADDER  SURGERY      HYSTERECTOMY  09/03/2024         Current Outpatient Medications:     ALPRAZolam (XANAX) 0.25 MG tablet, Xanax 0.25 mg tablet  Take 1 tablet every day by oral route as needed., Disp: , Rfl:     Calcium Carbonate-Vitamin D 600-200 MG-UNIT tablet, Every 12 (Twelve) Hours., Disp: , Rfl:     cetirizine (zyrTEC) 10 MG tablet, cetirizine 10 mg tablet  Take 1 tablet every day by oral route., Disp: , Rfl:     estradiol (ESTRACE) 0.1 MG/GM vaginal cream, Apply pea sized amount to urethra twice a week before bedtime, Disp: 45 g, Rfl: 2    fluticasone (FLONASE) 50 MCG/ACT nasal spray, fluticasone propionate 50 mcg/actuation nasal spray,suspension  1-2 sprays each nostril daily, Disp: , Rfl:     ibuprofen (ADVIL,MOTRIN) 600 MG tablet, Take 1 tablet by mouth Every 6 (Six) Hours As Needed for Mild Pain., Disp: , Rfl:     meloxicam (MOBIC) 15 MG tablet, Take 1 tablet by mouth Daily. (Patient not taking: Reported on 5/29/2025), Disp: 30 tablet, Rfl: 0    Allergies   Allergen Reactions    Erythromycin Myalgia, Nausea Only and Other (See Comments)    Hydromorphone Hcl Other (See Comments)     Severe headache       Social History     Tobacco Use    Smoking status: Former     Passive exposure: Past    Smokeless tobacco: Never   Vaping Use    Vaping status: Never Used   Substance Use Topics    Alcohol use: No    Drug use: No       Family History   Problem Relation Age of Onset    Cancer Father     Hypertension Brother     Breast cancer Neg Hx     Ovarian cancer Neg Hx     Uterine cancer Neg Hx     Colon cancer Neg Hx     Deep vein thrombosis Neg Hx     Pulmonary embolism Neg Hx        Review of Systems   Constitutional:  Positive for activity change. Negative for appetite change, fatigue, fever and unexpected weight change.   Eyes:  Negative for photophobia and visual disturbance.   Respiratory:  Negative for cough and shortness of breath.    Cardiovascular:  Negative for chest pain and palpitations.   Gastrointestinal:   "Negative for abdominal distention, abdominal pain, constipation, diarrhea and nausea.   Endocrine: Negative for cold intolerance and heat intolerance.   Genitourinary:  Negative for dyspareunia, dysuria, menstrual problem, pelvic pain, vaginal bleeding, vaginal discharge and vaginal pain.   Musculoskeletal:  Negative for back pain.   Skin:  Negative for color change and rash.   Neurological:  Negative for headaches.   Hematological:  Negative for adenopathy. Does not bruise/bleed easily.   Psychiatric/Behavioral:  Negative for dysphoric mood. The patient is not nervous/anxious.           /80   Ht 167.6 cm (66\")   Wt 98.4 kg (217 lb)   Breastfeeding No   BMI 35.02 kg/m²     Physical Exam  Vitals and nursing note reviewed. Exam conducted with a chaperone present.   Constitutional:       Appearance: Normal appearance. She is well-developed. She is obese.   HENT:      Head: Normocephalic and atraumatic.   Eyes:      General: No scleral icterus.     Conjunctiva/sclera: Conjunctivae normal.   Neck:      Thyroid: No thyromegaly.   Cardiovascular:      Rate and Rhythm: Normal rate and regular rhythm.   Pulmonary:      Breath sounds: Normal breath sounds.   Chest:   Breasts:     Right: No swelling, bleeding, inverted nipple, mass, nipple discharge, skin change or tenderness.      Left: No swelling, bleeding, inverted nipple, mass, nipple discharge, skin change or tenderness.   Abdominal:      General: There is no distension.      Palpations: Abdomen is soft. There is no mass.      Tenderness: There is no abdominal tenderness. There is no guarding or rebound.      Hernia: No hernia is present.   Genitourinary:     Labia:         Right: No rash, tenderness, lesion or injury.         Left: No rash, tenderness, lesion or injury.       Urethra: Prolapse present. No urethral pain, urethral swelling or urethral lesion.      Vagina: No signs of injury and foreign body. Prolapsed vaginal walls present. No vaginal discharge, " erythema, tenderness, bleeding or lesions.      Uterus: Absent.       Adnexa: Right adnexa normal and left adnexa normal.      Rectum: Normal.          Comments: Uterus and cervix absent  Normal cuff  No mesh palpable  Exam limited due to habitus    Musculoskeletal:      Cervical back: Neck supple.   Skin:     General: Skin is warm and dry.   Neurological:      Mental Status: She is alert and oriented to person, place, and time.   Psychiatric:         Mood and Affect: Mood normal.         Behavior: Behavior normal.         Thought Content: Thought content normal.         Judgment: Judgment normal.            Assessment/Plan  1) POP-status post prolapse surgery with [facility]  2) GYN HM: normal  pap/HPV 5/2025. Check pap/HPV PER PATIENT REQUEST.  SBE demonstrated and encouraged.  3) STD screening: declines Condoms encouraged.  4) Bone health - Weight bearing exercise, dietary calcium recommendations and vitamin D reviewed.   5) Diet and Exercise discussed  6) Smoking Status: No  7) Social: no issues  8)MMG: UTD 2/2025 B1. Schedule MMG 2/2026, pt prefers [facility]  9) DEXA-UTD 4/2023- normal BMD, repeat in 3-5 years  10)C scope-UTD 2/2023- repeat 7 years   11)  Urethral caruncle-  Rx for Estrace, pea-sized amount to urethra twice a week before bedtime.Patient counseled that vaginal estrogen rings, creams and tablets are available and highly effective at treating local vaginal symptoms such as atrophy and vaginal dryness.  Vaginal estrogen does not cause uterine overgrowth and does not require a progestogen to protect the uterus.  Very small amounts of estrogen are absorbed systemically.  For patients with a history of an estrogen dependent cancer such as breast cancer, the decision to use local estrogen for local vaginal symptoms should be made after consultation with her oncologist.  Possible side effects include local irritation or burning and/or vaginal bleeding and should always be reported.    12)Parts of  this document have been copied or forwarded from her previous visits and have been reviewed, updated and edited as indicated.   13) RTO 1 year annual and/or prn            Diagnoses and all orders for this visit:    1. Cervical smear, as part of routine gynecological examination (Primary)  -     IGP, Apt HPV,rfx 16 / 18,45    2. Routine gynecological examination  -     POC Urinalysis Dipstick  -     IGP, Apt HPV,rfx 16 / 18,45    3. Special screening examination for human papillomavirus (HPV)  -     IGP, Apt HPV,rfx 16 / 18,45    4. Asymptomatic menopause  -     DEXA Bone Density Axial; Future    5. Encounter for screening mammogram for malignant neoplasm of breast  -     Mammo Screening Digital Tomosynthesis Bilateral With CAD; Future        Sandi Baker MD  5/29/2025      12:21 EDT

## 2025-06-03 LAB
CYTOLOGIST CVX/VAG CYTO: NORMAL
CYTOLOGY CVX/VAG DOC CYTO: NORMAL
CYTOLOGY CVX/VAG DOC THIN PREP: NORMAL
DX ICD CODE: NORMAL
HPV I/H RISK 4 DNA CVX QL PROBE+SIG AMP: NEGATIVE
Lab: NORMAL
OTHER STN SPEC: NORMAL
SERVICE CMNT-IMP: NORMAL
STAT OF ADQ CVX/VAG CYTO-IMP: NORMAL

## 2025-06-03 RX ORDER — ESTRADIOL 0.1 MG/G
CREAM VAGINAL
Qty: 45 G | Refills: 2 | Status: SHIPPED | OUTPATIENT
Start: 2025-06-03

## 2025-06-25 ENCOUNTER — OFFICE VISIT (OUTPATIENT)
Dept: ORTHOPEDIC SURGERY | Facility: CLINIC | Age: 65
End: 2025-06-25
Payer: COMMERCIAL

## 2025-06-25 VITALS — BODY MASS INDEX: 34.87 KG/M2 | HEIGHT: 66 IN | WEIGHT: 217 LBS

## 2025-06-25 DIAGNOSIS — M17.12 PRIMARY OSTEOARTHRITIS OF LEFT KNEE: ICD-10-CM

## 2025-06-25 DIAGNOSIS — M17.11 PRIMARY OSTEOARTHRITIS OF RIGHT KNEE: Primary | ICD-10-CM

## 2025-06-25 RX ADMIN — LIDOCAINE HYDROCHLORIDE 8 ML: 10 INJECTION, SOLUTION EPIDURAL; INFILTRATION; INTRACAUDAL; PERINEURAL at 16:35

## 2025-06-25 RX ADMIN — TRIAMCINOLONE ACETONIDE 80 MG: 40 INJECTION, SUSPENSION INTRA-ARTICULAR; INTRAMUSCULAR at 16:35

## 2025-06-25 NOTE — PROGRESS NOTES
Subjective:     Patient ID: Pamela Mack is a 64 y.o. female.    Chief Complaint:  Follow-up bilateral knee pain, DJD  History of Present Illness  Danette returns to clinic today stating that she is getting decreasing efficacy out of her injections to her right knee in particular with significant pain at this point in time rates as a 7-8 out of 10, aching in nature, localized primarily to the lateral aspect of the knee.  Does note moderate crepitus on prolonged levels of activity especially with deep flexion and getting up from a seated position.  Denies any hip or groin pain.  Denies any fevers chills or sweats.    In regards to her left knee she still is getting some moderate recurrence of pain at this point in time it is not nearly to the intensity level as her right, rates it as a 4-5 out of 10 and aching in nature, occasional sharp pain and swelling noted.  Denies any cass buckling or giving way.  Denies hip or groin pain.    Social History     Occupational History    Not on file   Tobacco Use    Smoking status: Former     Passive exposure: Past    Smokeless tobacco: Never   Vaping Use    Vaping status: Never Used   Substance and Sexual Activity    Alcohol use: No    Drug use: No    Sexual activity: Not Currently     Birth control/protection: Post-menopausal, Hysterectomy     Comment: TLH/BS/TVT/A&P repair      Past Medical History:   Diagnosis Date    Anxiety     Arthritis     Cancer     SSC of forearm    Colon polyp     Fracture of wrist     Pelvic prolapse      Past Surgical History:   Procedure Laterality Date    ANTERIOR AND POSTERIOR VAGINAL REPAIR      AVULSION TOENAIL PLATE Bilateral     BLADDER SUSPENSION      COLONOSCOPY      COLONOSCOPY N/A 02/09/2023    Procedure: COLONOSCOPY;  Surgeon: Celso Kendrick MD;  Location: [facility];  Service: Gastroenterology;  Laterality: N/A;  diverticulosis    GALLBLADDER SURGERY      HYSTERECTOMY  09/03/2024       Family History   Problem Relation Age of  "Onset    Cancer Father     Hypertension Brother     Breast cancer Neg Hx     Ovarian cancer Neg Hx     Uterine cancer Neg Hx     Colon cancer Neg Hx     Deep vein thrombosis Neg Hx     Pulmonary embolism Neg Hx          Review of Systems        Objective:  Vitals:    06/25/25 1551   Weight: 98.4 kg (217 lb)   Height: 167.6 cm (66\")         06/25/25  1551   Weight: 98.4 kg (217 lb)     Body mass index is 35.02 kg/m².    Physical Exam    Vital signs reviewed.   General: No acute distress, alert and oriented  Eyes: conjunctiva clear; pupils equally round and reactive  ENT: external ears and nose atraumatic; oropharynx clear  CV: no peripheral edema  Resp: normal respiratory effort  Skin: no rashes or wounds; normal turgor  Psych: mood and affect appropriate; recent and remote memory intact       Physical Exam  Bilateral knees-active range of motion 3 to 120 degrees, 4+ out of 5 strength on flexion extension, maximal tenderness palpation lateral joint line on the right with overall valgus alignment, maximal tenderness medial lateral patellar facet on left knee with positive active patellar compression test bilaterally.  Stable to varus and valgus stress at 3 and 30 degrees.  Grade 1A Lachman, negative anterior posterior drawer.  No hip pain on logroll or Stinchfield exam.        Imaging:  Right Knee X-Ray  Indication: Pain    AP, Lateral, and [place] views    Findings:  No fracture  No bony lesion  Normal soft tissues  Advanced tricompartmental osteoarthritis with near bone-on-bone articulation of both medial and lateral compartments and complete bone-on-bone articulation of the lateral patellofemoral joint on axial view with subchondral bony erosion noted.  Compared to prior office x-rays    Assessment:        1. Primary osteoarthritis of right knee             Assessment & Plan  Discussed treatment options at length with patient-at this point in time given her recurrence of pain on the left which is still being fairly " well-managed with conservative treatment she would like to proceed with a repeat injection on that side today.     Patient would like to proceed with cortisone injection today to the left knee. Recommended limited use of affected extremity for the next 24 hours to only essential activites other than work on general active and passive motion. Recommended supplementing with ice and soft tissue massage. Discussed with patient that they should see results in 5-7 days, if no improvement in 5-6 weeks I have asked them to call the office to review other options. Patient should call office immediately if they notice redness, warmth, fevers, chills, or residual numbness or tingling for greater than 6 hours after injection.        However in regards to her right knee given failure of most recent injection and significant increasing levels of pain she would like proceed with right total knee arthroplasty.    I reviewed anatomy and a model of a total knee arthroplasty, as well as typical postoperative recovery of 6-12 months before maximal recovery, and possible need for rehabilitation stay after hospitalization. We also discussed risks, benefits, and alternatives of procedure with risks including but not limited to neurovascular damage, bleeding, infection, malalignment, chronic pian, failure of implants, osteolysis, loosening of implants, loss of motion, weakness, stiffness, instability, DVT, pulmonary embolus, death, stroke, complex regional pain syndrome, myocardial infarction, and need for additional procedures. Patient understood all these and had all questions answered before consenting to the procedure. No guarantees were given in regards to results from the surgery. We will have patient medically optimized by their primary care physician and plan on proceeding with surgery at next available date.     Patient denies history of DVT or pulmonary embolus, denies cardiac history, she is not diabetic.    Will plan for  outpatient status as well as use of I-assist device    Large Joint Arthrocentesis: L knee  Date/Time: 6/25/2025 4:35 PM  Consent given by: patient  Site marked: site marked  Timeout: Immediately prior to procedure a time out was called to verify the correct patient, procedure, equipment, support staff and site/side marked as required   Supporting Documentation  Indications: pain   Procedure Details  Location: knee - L knee  Preparation: Patient was prepped and draped in the usual sterile fashion  Needle size: 22 G  Approach: anterolateral  Medications administered: 80 mg triamcinolone acetonide 40 MG/ML; 8 mL lidocaine PF 1% 1 %  Patient tolerance: patient tolerated the procedure well with no immediate complications        Pamela Mack was in agreement with plan and had all questions answered.     Orders:  Orders Placed This Encounter   Procedures    Large Joint Arthrocentesis: L knee    XR Knee 3+ View With [place] Right       Medications:  No orders of the defined types were placed in this encounter.      Followup:  No follow-ups on file.    Diagnoses and all orders for this visit:    1. Primary osteoarthritis of right knee (Primary)  -     XR Knee 3+ View With [place] Right    Other orders  -     Large Joint Arthrocentesis: L knee                   Dictated utilizing Dragon dictation     Patient or patient representative verbalized consent for the use of Ambient Listening during the visit with  Zion Taylor MD for chart documentation. 7/6/2025  20:08 EDT

## 2025-07-06 PROBLEM — M17.11 PRIMARY OSTEOARTHRITIS OF RIGHT KNEE: Status: ACTIVE | Noted: 2025-06-25

## 2025-07-06 RX ORDER — PREGABALIN 150 MG/1
150 CAPSULE ORAL ONCE
OUTPATIENT
Start: 2025-07-06 | End: 2025-07-06

## 2025-07-06 RX ORDER — ACETAMINOPHEN 500 MG
1000 TABLET ORAL ONCE
OUTPATIENT
Start: 2025-07-06 | End: 2025-07-06

## 2025-07-06 RX ORDER — TRIAMCINOLONE ACETONIDE 40 MG/ML
80 INJECTION, SUSPENSION INTRA-ARTICULAR; INTRAMUSCULAR
Status: COMPLETED | OUTPATIENT
Start: 2025-06-25 | End: 2025-06-25

## 2025-07-06 RX ORDER — LIDOCAINE HYDROCHLORIDE 10 MG/ML
8 INJECTION, SOLUTION EPIDURAL; INFILTRATION; INTRACAUDAL; PERINEURAL
Status: COMPLETED | OUTPATIENT
Start: 2025-06-25 | End: 2025-06-25

## 2025-07-06 RX ORDER — MELOXICAM 7.5 MG/1
15 TABLET ORAL ONCE
OUTPATIENT
Start: 2025-07-06 | End: 2025-07-06

## 2025-08-19 ENCOUNTER — PRE-ADMISSION TESTING (OUTPATIENT)
Dept: PREADMISSION TESTING | Facility: HOSPITAL | Age: 65
End: 2025-08-19
Payer: COMMERCIAL

## 2025-08-19 ENCOUNTER — HOSPITAL ENCOUNTER (OUTPATIENT)
Dept: PHYSICAL THERAPY | Facility: HOSPITAL | Age: 65
Setting detail: THERAPIES SERIES
Discharge: HOME | End: 2025-08-19
Payer: COMMERCIAL

## 2025-08-19 VITALS
WEIGHT: 215 LBS | HEIGHT: 66 IN | DIASTOLIC BLOOD PRESSURE: 77 MMHG | SYSTOLIC BLOOD PRESSURE: 120 MMHG | RESPIRATION RATE: 20 BRPM | HEART RATE: 84 BPM | BODY MASS INDEX: 34.55 KG/M2 | OXYGEN SATURATION: 98 %

## 2025-08-19 DIAGNOSIS — M17.11 PRIMARY OSTEOARTHRITIS OF RIGHT KNEE: ICD-10-CM

## 2025-08-19 LAB
ABO GROUP BLD: NORMAL
BLD GP AB SCN SERPL QL: NEGATIVE
QT INTERVAL: 386 MS
QTC INTERVAL: 429 MS
RH BLD: NEGATIVE
T&S EXPIRATION DATE: NORMAL

## 2025-08-19 PROCEDURE — 86850 RBC ANTIBODY SCREEN: CPT | Performed by: ORTHOPAEDIC SURGERY

## 2025-08-19 PROCEDURE — 86900 BLOOD TYPING SEROLOGIC ABO: CPT | Performed by: ORTHOPAEDIC SURGERY

## 2025-08-19 PROCEDURE — 93005 ELECTROCARDIOGRAM TRACING: CPT

## 2025-08-19 PROCEDURE — 86901 BLOOD TYPING SEROLOGIC RH(D): CPT | Performed by: ORTHOPAEDIC SURGERY

## 2025-08-19 RX ORDER — CALCIUM POLYCARBOPHIL 625 MG
625 TABLET ORAL DAILY
COMMUNITY

## 2025-08-19 RX ORDER — VIT C/B6/B5/MAGNESIUM/HERB 173 50-5-6-5MG
500 CAPSULE ORAL DAILY
COMMUNITY

## 2025-08-22 ENCOUNTER — PREP FOR SURGERY (OUTPATIENT)
Dept: OTHER | Facility: HOSPITAL | Age: 65
End: 2025-08-22
Payer: COMMERCIAL

## 2025-08-26 ENCOUNTER — TELEPHONE (OUTPATIENT)
Dept: ORTHOPEDIC SURGERY | Facility: CLINIC | Age: 65
End: 2025-08-26
Payer: COMMERCIAL

## 2025-08-28 ENCOUNTER — TELEMEDICINE (OUTPATIENT)
Dept: ORTHOPEDIC SURGERY | Facility: CLINIC | Age: 65
End: 2025-08-28
Payer: COMMERCIAL

## (undated) DEVICE — JACKT LAB F/R KNIT CUFF/COLR XLG BLU

## (undated) DEVICE — SOL IRR H2O BTL 1000ML STRL

## (undated) DEVICE — HYBRID TUBING/CAP SET FOR OLYMPUS® SCOPES: Brand: ERBE

## (undated) DEVICE — Device

## (undated) DEVICE — GLV SURG SENSICARE PI MIC PF SZ7.5 LF STRL

## (undated) DEVICE — KT ORCA ORCAPOD DISP STRL

## (undated) DEVICE — ADAPT CLN BIOGUARD AIR/H2O DISP

## (undated) DEVICE — SAFELINER SUCTION CANISTER 1000CC: Brand: DEROYAL

## (undated) DEVICE — BW-412T DISP COMBO CLEANING BRUSH: Brand: SINGLE USE COMBINATION CLEANING BRUSH

## (undated) DEVICE — SYR LL 3CC